# Patient Record
Sex: MALE | Race: OTHER | NOT HISPANIC OR LATINO | URBAN - METROPOLITAN AREA
[De-identification: names, ages, dates, MRNs, and addresses within clinical notes are randomized per-mention and may not be internally consistent; named-entity substitution may affect disease eponyms.]

---

## 2023-01-01 ENCOUNTER — INPATIENT (INPATIENT)
Facility: HOSPITAL | Age: 68
LOS: 14 days | Discharge: HOSPICE | DRG: 435 | End: 2023-09-25
Attending: SURGERY | Admitting: SURGERY
Payer: COMMERCIAL

## 2023-01-01 ENCOUNTER — INPATIENT (INPATIENT)
Facility: HOSPITAL | Age: 68
LOS: 0 days | DRG: 951 | End: 2023-09-26
Attending: INTERNAL MEDICINE | Admitting: INTERNAL MEDICINE
Payer: OTHER MISCELLANEOUS

## 2023-01-01 VITALS
HEIGHT: 72.01 IN | RESPIRATION RATE: 20 BRPM | OXYGEN SATURATION: 93 % | SYSTOLIC BLOOD PRESSURE: 103 MMHG | HEART RATE: 70 BPM | TEMPERATURE: 100 F | DIASTOLIC BLOOD PRESSURE: 67 MMHG | WEIGHT: 195.11 LBS

## 2023-01-01 VITALS
OXYGEN SATURATION: 94 % | DIASTOLIC BLOOD PRESSURE: 64 MMHG | HEART RATE: 58 BPM | TEMPERATURE: 99 F | RESPIRATION RATE: 20 BRPM | SYSTOLIC BLOOD PRESSURE: 100 MMHG

## 2023-01-01 VITALS
HEART RATE: 70 BPM | OXYGEN SATURATION: 93 % | SYSTOLIC BLOOD PRESSURE: 103 MMHG | RESPIRATION RATE: 18 BRPM | DIASTOLIC BLOOD PRESSURE: 67 MMHG | TEMPERATURE: 100 F

## 2023-01-01 VITALS
RESPIRATION RATE: 17 BRPM | SYSTOLIC BLOOD PRESSURE: 105 MMHG | OXYGEN SATURATION: 98 % | HEART RATE: 76 BPM | TEMPERATURE: 98 F | DIASTOLIC BLOOD PRESSURE: 61 MMHG

## 2023-01-01 DIAGNOSIS — G89.3 NEOPLASM RELATED PAIN (ACUTE) (CHRONIC): ICD-10-CM

## 2023-01-01 DIAGNOSIS — G93.49 OTHER ENCEPHALOPATHY: ICD-10-CM

## 2023-01-01 DIAGNOSIS — Z51.5 ENCOUNTER FOR PALLIATIVE CARE: ICD-10-CM

## 2023-01-01 DIAGNOSIS — R53.81 OTHER MALAISE: ICD-10-CM

## 2023-01-01 DIAGNOSIS — C25.9 MALIGNANT NEOPLASM OF PANCREAS, UNSPECIFIED: ICD-10-CM

## 2023-01-01 DIAGNOSIS — R45.1 RESTLESSNESS AND AGITATION: ICD-10-CM

## 2023-01-01 DIAGNOSIS — R53.2 FUNCTIONAL QUADRIPLEGIA: ICD-10-CM

## 2023-01-01 DIAGNOSIS — Z96.642 PRESENCE OF LEFT ARTIFICIAL HIP JOINT: Chronic | ICD-10-CM

## 2023-01-01 LAB
-  AMPICILLIN/SULBACTAM: SIGNIFICANT CHANGE UP
-  AMPICILLIN/SULBACTAM: SIGNIFICANT CHANGE UP
-  AMPICILLIN: SIGNIFICANT CHANGE UP
-  CEFAZOLIN: SIGNIFICANT CHANGE UP
-  CEFAZOLIN: SIGNIFICANT CHANGE UP
-  CEFEPIME: SIGNIFICANT CHANGE UP
-  CEFTRIAXONE: SIGNIFICANT CHANGE UP
-  CEFTRIAXONE: SIGNIFICANT CHANGE UP
-  CIPROFLOXACIN: SIGNIFICANT CHANGE UP
-  CIPROFLOXACIN: SIGNIFICANT CHANGE UP
-  DAPTOMYCIN: SIGNIFICANT CHANGE UP
-  ERTAPENEM: SIGNIFICANT CHANGE UP
-  ERTAPENEM: SIGNIFICANT CHANGE UP
-  GENTAMICIN: SIGNIFICANT CHANGE UP
-  GENTAMICIN: SIGNIFICANT CHANGE UP
-  LINEZOLID: SIGNIFICANT CHANGE UP
-  MEROPENEM: SIGNIFICANT CHANGE UP
-  PIPERACILLIN/TAZOBACTAM: SIGNIFICANT CHANGE UP
-  PIPERACILLIN/TAZOBACTAM: SIGNIFICANT CHANGE UP
-  TOBRAMYCIN: SIGNIFICANT CHANGE UP
-  TOBRAMYCIN: SIGNIFICANT CHANGE UP
-  TRIMETHOPRIM/SULFAMETHOXAZOLE: SIGNIFICANT CHANGE UP
-  TRIMETHOPRIM/SULFAMETHOXAZOLE: SIGNIFICANT CHANGE UP
-  VANCOMYCIN: SIGNIFICANT CHANGE UP
-  VANCOMYCIN: SIGNIFICANT CHANGE UP
A1C WITH ESTIMATED AVERAGE GLUCOSE RESULT: 4.6 % — SIGNIFICANT CHANGE UP (ref 4–5.6)
ALBUMIN SERPL ELPH-MCNC: 1.7 G/DL — LOW (ref 3.3–5)
ALBUMIN SERPL ELPH-MCNC: 1.8 G/DL — LOW (ref 3.3–5)
ALBUMIN SERPL ELPH-MCNC: 1.9 G/DL — LOW (ref 3.3–5)
ALBUMIN SERPL ELPH-MCNC: 2 G/DL — LOW (ref 3.3–5)
ALBUMIN SERPL ELPH-MCNC: 2 G/DL — LOW (ref 3.3–5)
ALBUMIN SERPL ELPH-MCNC: 2.1 G/DL — LOW (ref 3.3–5)
ALBUMIN SERPL ELPH-MCNC: 2.2 G/DL — LOW (ref 3.3–5)
ALBUMIN SERPL ELPH-MCNC: 2.2 G/DL — LOW (ref 3.3–5)
ALBUMIN SERPL ELPH-MCNC: SIGNIFICANT CHANGE UP (ref 3.3–5)
ALP SERPL-CCNC: 293 U/L — HIGH (ref 40–120)
ALP SERPL-CCNC: 294 U/L — HIGH (ref 40–120)
ALP SERPL-CCNC: 301 U/L — HIGH (ref 40–120)
ALP SERPL-CCNC: 313 U/L — HIGH (ref 40–120)
ALP SERPL-CCNC: 318 U/L — HIGH (ref 40–120)
ALP SERPL-CCNC: 323 U/L — HIGH (ref 40–120)
ALP SERPL-CCNC: 327 U/L — HIGH (ref 40–120)
ALP SERPL-CCNC: 336 U/L — HIGH (ref 40–120)
ALP SERPL-CCNC: 391 U/L — HIGH (ref 40–120)
ALP SERPL-CCNC: 414 U/L — HIGH (ref 40–120)
ALP SERPL-CCNC: 484 U/L — HIGH (ref 40–120)
ALP SERPL-CCNC: 519 U/L — HIGH (ref 40–120)
ALP SERPL-CCNC: 536 U/L — HIGH (ref 40–120)
ALP SERPL-CCNC: 565 U/L — HIGH (ref 40–120)
ALP SERPL-CCNC: 610 U/L — HIGH (ref 40–120)
ALP SERPL-CCNC: 649 U/L — HIGH (ref 40–120)
ALP SERPL-CCNC: SIGNIFICANT CHANGE UP (ref 40–120)
ALP SERPL-CCNC: SIGNIFICANT CHANGE UP U/L (ref 40–120)
ALT FLD-CCNC: 29 U/L — SIGNIFICANT CHANGE UP (ref 10–45)
ALT FLD-CCNC: 30 U/L — SIGNIFICANT CHANGE UP (ref 10–45)
ALT FLD-CCNC: 32 U/L — SIGNIFICANT CHANGE UP (ref 10–45)
ALT FLD-CCNC: 33 U/L — SIGNIFICANT CHANGE UP (ref 10–45)
ALT FLD-CCNC: 38 U/L — SIGNIFICANT CHANGE UP (ref 10–45)
ALT FLD-CCNC: 39 U/L — SIGNIFICANT CHANGE UP (ref 10–45)
ALT FLD-CCNC: 41 U/L — SIGNIFICANT CHANGE UP (ref 10–45)
ALT FLD-CCNC: 44 U/L — SIGNIFICANT CHANGE UP (ref 10–45)
ALT FLD-CCNC: 46 U/L — HIGH (ref 10–45)
ALT FLD-CCNC: 51 U/L — HIGH (ref 10–45)
ALT FLD-CCNC: 54 U/L — HIGH (ref 10–45)
ALT FLD-CCNC: 55 U/L — HIGH (ref 10–45)
ALT FLD-CCNC: 55 U/L — HIGH (ref 10–45)
ALT FLD-CCNC: SIGNIFICANT CHANGE UP (ref 10–45)
ALT FLD-CCNC: SIGNIFICANT CHANGE UP U/L (ref 10–45)
AMMONIA BLD-MCNC: 12 UMOL/L — SIGNIFICANT CHANGE UP (ref 11–55)
ANION GAP SERPL CALC-SCNC: 10 MMOL/L — SIGNIFICANT CHANGE UP (ref 5–17)
ANION GAP SERPL CALC-SCNC: 11 MMOL/L — SIGNIFICANT CHANGE UP (ref 5–17)
ANION GAP SERPL CALC-SCNC: 12 MMOL/L — SIGNIFICANT CHANGE UP (ref 5–17)
ANION GAP SERPL CALC-SCNC: 13 MMOL/L — SIGNIFICANT CHANGE UP (ref 5–17)
ANION GAP SERPL CALC-SCNC: 8 MMOL/L — SIGNIFICANT CHANGE UP (ref 5–17)
ANION GAP SERPL CALC-SCNC: 8 MMOL/L — SIGNIFICANT CHANGE UP (ref 5–17)
ANION GAP SERPL CALC-SCNC: 9 MMOL/L — SIGNIFICANT CHANGE UP (ref 5–17)
ANION GAP SERPL CALC-SCNC: SIGNIFICANT CHANGE UP MMOL/L (ref 5–17)
ANISOCYTOSIS BLD QL: SIGNIFICANT CHANGE UP
APPEARANCE UR: ABNORMAL
APPEARANCE UR: CLEAR — SIGNIFICANT CHANGE UP
APTT BLD: 31.9 SEC — SIGNIFICANT CHANGE UP (ref 24.5–35.6)
APTT BLD: 32.2 SEC — SIGNIFICANT CHANGE UP (ref 24.5–35.6)
APTT BLD: 33.3 SEC — SIGNIFICANT CHANGE UP (ref 24.5–35.6)
APTT BLD: 35.1 SEC — SIGNIFICANT CHANGE UP (ref 24.5–35.6)
APTT BLD: 37.1 SEC — HIGH (ref 24.5–35.6)
APTT BLD: 43.3 SEC — HIGH (ref 24.5–35.6)
APTT BLD: 45.9 SEC — HIGH (ref 24.5–35.6)
AST SERPL-CCNC: 105 U/L — HIGH (ref 10–40)
AST SERPL-CCNC: 112 U/L — HIGH (ref 10–40)
AST SERPL-CCNC: 114 U/L — HIGH (ref 10–40)
AST SERPL-CCNC: 137 U/L — HIGH (ref 10–40)
AST SERPL-CCNC: 155 U/L — HIGH (ref 10–40)
AST SERPL-CCNC: 158 U/L — HIGH (ref 10–40)
AST SERPL-CCNC: 178 U/L — HIGH (ref 10–40)
AST SERPL-CCNC: 187 U/L — HIGH (ref 10–40)
AST SERPL-CCNC: 267 U/L — HIGH (ref 10–40)
AST SERPL-CCNC: 60 U/L — HIGH (ref 10–40)
AST SERPL-CCNC: 71 U/L — HIGH (ref 10–40)
AST SERPL-CCNC: 74 U/L — HIGH (ref 10–40)
AST SERPL-CCNC: 76 U/L — HIGH (ref 10–40)
AST SERPL-CCNC: 78 U/L — HIGH (ref 10–40)
AST SERPL-CCNC: 88 U/L — HIGH (ref 10–40)
AST SERPL-CCNC: 90 U/L — HIGH (ref 10–40)
AST SERPL-CCNC: SIGNIFICANT CHANGE UP (ref 10–40)
AST SERPL-CCNC: SIGNIFICANT CHANGE UP U/L (ref 10–40)
B PERT IGG+IGM PNL SER: SIGNIFICANT CHANGE UP
B PERT IGG+IGM PNL SER: SIGNIFICANT CHANGE UP
BACTERIA # UR AUTO: ABNORMAL /HPF
BASOPHILS # BLD AUTO: 0.03 K/UL — SIGNIFICANT CHANGE UP (ref 0–0.2)
BASOPHILS # BLD AUTO: 0.07 K/UL — SIGNIFICANT CHANGE UP (ref 0–0.2)
BASOPHILS NFR BLD AUTO: 0.3 % — SIGNIFICANT CHANGE UP (ref 0–2)
BASOPHILS NFR BLD AUTO: 0.9 % — SIGNIFICANT CHANGE UP (ref 0–2)
BILIRUB DIRECT SERPL-MCNC: 7.3 MG/DL — HIGH (ref 0–0.3)
BILIRUB DIRECT SERPL-MCNC: 7.4 MG/DL — HIGH (ref 0–0.3)
BILIRUB DIRECT SERPL-MCNC: 7.9 MG/DL — HIGH (ref 0–0.3)
BILIRUB DIRECT SERPL-MCNC: 8.2 MG/DL — HIGH (ref 0–0.3)
BILIRUB DIRECT SERPL-MCNC: 8.4 MG/DL — HIGH (ref 0–0.3)
BILIRUB DIRECT SERPL-MCNC: 8.5 MG/DL — HIGH (ref 0–0.3)
BILIRUB DIRECT SERPL-MCNC: 8.6 MG/DL — HIGH (ref 0–0.3)
BILIRUB DIRECT SERPL-MCNC: 8.8 MG/DL — HIGH (ref 0–0.3)
BILIRUB DIRECT SERPL-MCNC: 9.2 MG/DL — HIGH (ref 0–0.3)
BILIRUB DIRECT SERPL-MCNC: 9.2 MG/DL — HIGH (ref 0–0.3)
BILIRUB DIRECT SERPL-MCNC: 9.3 MG/DL — HIGH (ref 0–0.3)
BILIRUB DIRECT SERPL-MCNC: 9.5 MG/DL — HIGH (ref 0–0.3)
BILIRUB DIRECT SERPL-MCNC: 9.9 MG/DL — HIGH (ref 0–0.3)
BILIRUB DIRECT SERPL-MCNC: SIGNIFICANT CHANGE UP (ref 0–0.3)
BILIRUB INDIRECT FLD-MCNC: 2.2 MG/DL — HIGH (ref 0.2–1)
BILIRUB INDIRECT FLD-MCNC: 2.4 MG/DL — HIGH (ref 0.2–1)
BILIRUB INDIRECT FLD-MCNC: 2.4 MG/DL — HIGH (ref 0.2–1)
BILIRUB INDIRECT FLD-MCNC: 2.8 MG/DL — HIGH (ref 0.2–1)
BILIRUB INDIRECT FLD-MCNC: 3 MG/DL — HIGH (ref 0.2–1)
BILIRUB INDIRECT FLD-MCNC: 3.1 MG/DL — HIGH (ref 0.2–1)
BILIRUB INDIRECT FLD-MCNC: 3.2 MG/DL — HIGH (ref 0.2–1)
BILIRUB INDIRECT FLD-MCNC: 3.3 MG/DL — HIGH (ref 0.2–1)
BILIRUB INDIRECT FLD-MCNC: 3.3 MG/DL — HIGH (ref 0.2–1)
BILIRUB INDIRECT FLD-MCNC: 3.9 MG/DL — HIGH (ref 0.2–1)
BILIRUB INDIRECT FLD-MCNC: SIGNIFICANT CHANGE UP MG/DL (ref 0.2–1)
BILIRUB SERPL-MCNC: 10.8 MG/DL — HIGH (ref 0.2–1.2)
BILIRUB SERPL-MCNC: 11.2 MG/DL — HIGH (ref 0.2–1.2)
BILIRUB SERPL-MCNC: 11.4 MG/DL — HIGH (ref 0.2–1.2)
BILIRUB SERPL-MCNC: 11.4 MG/DL — HIGH (ref 0.2–1.2)
BILIRUB SERPL-MCNC: 11.5 MG/DL — HIGH (ref 0.2–1.2)
BILIRUB SERPL-MCNC: 11.5 MG/DL — HIGH (ref 0.2–1.2)
BILIRUB SERPL-MCNC: 12 MG/DL — HIGH (ref 0.2–1.2)
BILIRUB SERPL-MCNC: 12.2 MG/DL — HIGH (ref 0.2–1.2)
BILIRUB SERPL-MCNC: 12.6 MG/DL — HIGH (ref 0.2–1.2)
BILIRUB SERPL-MCNC: 12.6 MG/DL — HIGH (ref 0.2–1.2)
BILIRUB SERPL-MCNC: 12.8 MG/DL — HIGH (ref 0.2–1.2)
BILIRUB SERPL-MCNC: 13.2 MG/DL — HIGH (ref 0.2–1.2)
BILIRUB SERPL-MCNC: 13.8 MG/DL — HIGH (ref 0.2–1.2)
BILIRUB SERPL-MCNC: 14 MG/DL — HIGH (ref 0.2–1.2)
BILIRUB SERPL-MCNC: 9.7 MG/DL — HIGH (ref 0.2–1.2)
BILIRUB SERPL-MCNC: 9.8 MG/DL — HIGH (ref 0.2–1.2)
BILIRUB SERPL-MCNC: 9.9 MG/DL — HIGH (ref 0.2–1.2)
BILIRUB SERPL-MCNC: SIGNIFICANT CHANGE UP (ref 0.2–1.2)
BILIRUB UR-MCNC: ABNORMAL
BILIRUB UR-MCNC: ABNORMAL
BLD GP AB SCN SERPL QL: NEGATIVE — SIGNIFICANT CHANGE UP
BUN SERPL-MCNC: 20 MG/DL — SIGNIFICANT CHANGE UP (ref 7–23)
BUN SERPL-MCNC: 21 MG/DL — SIGNIFICANT CHANGE UP (ref 7–23)
BUN SERPL-MCNC: 22 MG/DL — SIGNIFICANT CHANGE UP (ref 7–23)
BUN SERPL-MCNC: 24 MG/DL — HIGH (ref 7–23)
BUN SERPL-MCNC: 25 MG/DL — HIGH (ref 7–23)
BUN SERPL-MCNC: 26 MG/DL — HIGH (ref 7–23)
BUN SERPL-MCNC: 27 MG/DL — HIGH (ref 7–23)
BUN SERPL-MCNC: 27 MG/DL — HIGH (ref 7–23)
BUN SERPL-MCNC: 30 MG/DL — HIGH (ref 7–23)
BUN SERPL-MCNC: 31 MG/DL — HIGH (ref 7–23)
BUN SERPL-MCNC: 31 MG/DL — HIGH (ref 7–23)
BUN SERPL-MCNC: 34 MG/DL — HIGH (ref 7–23)
BUN SERPL-MCNC: 36 MG/DL — HIGH (ref 7–23)
BUN SERPL-MCNC: 36 MG/DL — HIGH (ref 7–23)
BUN SERPL-MCNC: 37 MG/DL — HIGH (ref 7–23)
BUN SERPL-MCNC: 39 MG/DL — HIGH (ref 7–23)
BUN SERPL-MCNC: 40 MG/DL — HIGH (ref 7–23)
CALCIUM SERPL-MCNC: 7 MG/DL — LOW (ref 8.4–10.5)
CALCIUM SERPL-MCNC: 7.8 MG/DL — LOW (ref 8.4–10.5)
CALCIUM SERPL-MCNC: 8 MG/DL — LOW (ref 8.4–10.5)
CALCIUM SERPL-MCNC: 8.1 MG/DL — LOW (ref 8.4–10.5)
CALCIUM SERPL-MCNC: 8.3 MG/DL — LOW (ref 8.4–10.5)
CALCIUM SERPL-MCNC: 8.4 MG/DL — SIGNIFICANT CHANGE UP (ref 8.4–10.5)
CALCIUM SERPL-MCNC: 8.5 MG/DL — SIGNIFICANT CHANGE UP (ref 8.4–10.5)
CALCIUM SERPL-MCNC: 8.6 MG/DL — SIGNIFICANT CHANGE UP (ref 8.4–10.5)
CALCIUM SERPL-MCNC: SIGNIFICANT CHANGE UP (ref 8.4–10.5)
CHLORIDE SERPL-SCNC: 100 MMOL/L — SIGNIFICANT CHANGE UP (ref 96–108)
CHLORIDE SERPL-SCNC: 100 MMOL/L — SIGNIFICANT CHANGE UP (ref 96–108)
CHLORIDE SERPL-SCNC: 102 MMOL/L — SIGNIFICANT CHANGE UP (ref 96–108)
CHLORIDE SERPL-SCNC: 104 MMOL/L — SIGNIFICANT CHANGE UP (ref 96–108)
CHLORIDE SERPL-SCNC: 105 MMOL/L — SIGNIFICANT CHANGE UP (ref 96–108)
CHLORIDE SERPL-SCNC: 105 MMOL/L — SIGNIFICANT CHANGE UP (ref 96–108)
CHLORIDE SERPL-SCNC: 106 MMOL/L — SIGNIFICANT CHANGE UP (ref 96–108)
CHLORIDE SERPL-SCNC: 108 MMOL/L — SIGNIFICANT CHANGE UP (ref 96–108)
CHLORIDE SERPL-SCNC: 110 MMOL/L — HIGH (ref 96–108)
CHLORIDE SERPL-SCNC: 113 MMOL/L — HIGH (ref 96–108)
CHLORIDE SERPL-SCNC: 114 MMOL/L — HIGH (ref 96–108)
CHLORIDE SERPL-SCNC: 114 MMOL/L — HIGH (ref 96–108)
CHLORIDE SERPL-SCNC: 99 MMOL/L — SIGNIFICANT CHANGE UP (ref 96–108)
CHLORIDE SERPL-SCNC: SIGNIFICANT CHANGE UP (ref 96–108)
CK SERPL-CCNC: 68 U/L — SIGNIFICANT CHANGE UP (ref 30–200)
CO2 SERPL-SCNC: 19 MMOL/L — LOW (ref 22–31)
CO2 SERPL-SCNC: 20 MMOL/L — LOW (ref 22–31)
CO2 SERPL-SCNC: 21 MMOL/L — LOW (ref 22–31)
CO2 SERPL-SCNC: 22 MMOL/L — SIGNIFICANT CHANGE UP (ref 22–31)
CO2 SERPL-SCNC: 24 MMOL/L — SIGNIFICANT CHANGE UP (ref 22–31)
CO2 SERPL-SCNC: 25 MMOL/L — SIGNIFICANT CHANGE UP (ref 22–31)
CO2 SERPL-SCNC: 25 MMOL/L — SIGNIFICANT CHANGE UP (ref 22–31)
CO2 SERPL-SCNC: SIGNIFICANT CHANGE UP (ref 22–31)
COLOR FLD: YELLOW — SIGNIFICANT CHANGE UP
COLOR FLD: YELLOW — SIGNIFICANT CHANGE UP
COLOR SPEC: ABNORMAL
COLOR SPEC: YELLOW — SIGNIFICANT CHANGE UP
COMMENT - FLUIDS: SIGNIFICANT CHANGE UP
COMMENT - FLUIDS: SIGNIFICANT CHANGE UP
COMMENT - URINE: SIGNIFICANT CHANGE UP
CREAT SERPL-MCNC: 0.89 MG/DL — SIGNIFICANT CHANGE UP (ref 0.5–1.3)
CREAT SERPL-MCNC: 1.01 MG/DL — SIGNIFICANT CHANGE UP (ref 0.5–1.3)
CREAT SERPL-MCNC: 1.08 MG/DL — SIGNIFICANT CHANGE UP (ref 0.5–1.3)
CREAT SERPL-MCNC: 1.08 MG/DL — SIGNIFICANT CHANGE UP (ref 0.5–1.3)
CREAT SERPL-MCNC: 1.09 MG/DL — SIGNIFICANT CHANGE UP (ref 0.5–1.3)
CREAT SERPL-MCNC: 1.11 MG/DL — SIGNIFICANT CHANGE UP (ref 0.5–1.3)
CREAT SERPL-MCNC: 1.13 MG/DL — SIGNIFICANT CHANGE UP (ref 0.5–1.3)
CREAT SERPL-MCNC: 1.15 MG/DL — SIGNIFICANT CHANGE UP (ref 0.5–1.3)
CREAT SERPL-MCNC: 1.15 MG/DL — SIGNIFICANT CHANGE UP (ref 0.5–1.3)
CREAT SERPL-MCNC: 1.16 MG/DL — SIGNIFICANT CHANGE UP (ref 0.5–1.3)
CREAT SERPL-MCNC: 1.2 MG/DL — SIGNIFICANT CHANGE UP (ref 0.5–1.3)
CREAT SERPL-MCNC: 1.21 MG/DL — SIGNIFICANT CHANGE UP (ref 0.5–1.3)
CREAT SERPL-MCNC: 1.21 MG/DL — SIGNIFICANT CHANGE UP (ref 0.5–1.3)
CREAT SERPL-MCNC: 1.38 MG/DL — HIGH (ref 0.5–1.3)
CREAT SERPL-MCNC: 1.4 MG/DL — HIGH (ref 0.5–1.3)
CREAT SERPL-MCNC: 1.53 MG/DL — HIGH (ref 0.5–1.3)
CREAT SERPL-MCNC: 1.61 MG/DL — HIGH (ref 0.5–1.3)
CREAT SERPL-MCNC: 1.64 MG/DL — HIGH (ref 0.5–1.3)
CREAT SERPL-MCNC: 1.78 MG/DL — HIGH (ref 0.5–1.3)
CULTURE RESULTS: NO GROWTH — SIGNIFICANT CHANGE UP
CULTURE RESULTS: SIGNIFICANT CHANGE UP
DIFF PNL FLD: ABNORMAL
DIFF PNL FLD: NEGATIVE — SIGNIFICANT CHANGE UP
EGFR: 42 ML/MIN/1.73M2 — LOW
EGFR: 46 ML/MIN/1.73M2 — LOW
EGFR: 47 ML/MIN/1.73M2 — LOW
EGFR: 50 ML/MIN/1.73M2 — LOW
EGFR: 55 ML/MIN/1.73M2 — LOW
EGFR: 56 ML/MIN/1.73M2 — LOW
EGFR: 66 ML/MIN/1.73M2 — SIGNIFICANT CHANGE UP
EGFR: 66 ML/MIN/1.73M2 — SIGNIFICANT CHANGE UP
EGFR: 67 ML/MIN/1.73M2 — SIGNIFICANT CHANGE UP
EGFR: 69 ML/MIN/1.73M2 — SIGNIFICANT CHANGE UP
EGFR: 70 ML/MIN/1.73M2 — SIGNIFICANT CHANGE UP
EGFR: 70 ML/MIN/1.73M2 — SIGNIFICANT CHANGE UP
EGFR: 72 ML/MIN/1.73M2 — SIGNIFICANT CHANGE UP
EGFR: 73 ML/MIN/1.73M2 — SIGNIFICANT CHANGE UP
EGFR: 75 ML/MIN/1.73M2 — SIGNIFICANT CHANGE UP
EGFR: 76 ML/MIN/1.73M2 — SIGNIFICANT CHANGE UP
EGFR: 76 ML/MIN/1.73M2 — SIGNIFICANT CHANGE UP
EGFR: 82 ML/MIN/1.73M2 — SIGNIFICANT CHANGE UP
EGFR: 95 ML/MIN/1.73M2 — SIGNIFICANT CHANGE UP
EOSINOPHIL # BLD AUTO: 0.07 K/UL — SIGNIFICANT CHANGE UP (ref 0–0.5)
EOSINOPHIL # BLD AUTO: 0.16 K/UL — SIGNIFICANT CHANGE UP (ref 0–0.5)
EOSINOPHIL NFR BLD AUTO: 0.9 % — SIGNIFICANT CHANGE UP (ref 0–6)
EOSINOPHIL NFR BLD AUTO: 1.8 % — SIGNIFICANT CHANGE UP (ref 0–6)
EPI CELLS # UR: ABNORMAL /HPF (ref 0–5)
ESTIMATED AVERAGE GLUCOSE: 85 MG/DL — SIGNIFICANT CHANGE UP (ref 68–114)
FLUID INTAKE SUBSTANCE CLASS: SIGNIFICANT CHANGE UP
FLUID INTAKE SUBSTANCE CLASS: SIGNIFICANT CHANGE UP
GIANT PLATELETS BLD QL SMEAR: PRESENT — SIGNIFICANT CHANGE UP
GLUCOSE BLDC GLUCOMTR-MCNC: 102 MG/DL — HIGH (ref 70–99)
GLUCOSE BLDC GLUCOMTR-MCNC: 102 MG/DL — HIGH (ref 70–99)
GLUCOSE BLDC GLUCOMTR-MCNC: 104 MG/DL — HIGH (ref 70–99)
GLUCOSE BLDC GLUCOMTR-MCNC: 105 MG/DL — HIGH (ref 70–99)
GLUCOSE BLDC GLUCOMTR-MCNC: 106 MG/DL — HIGH (ref 70–99)
GLUCOSE BLDC GLUCOMTR-MCNC: 107 MG/DL — HIGH (ref 70–99)
GLUCOSE BLDC GLUCOMTR-MCNC: 108 MG/DL — HIGH (ref 70–99)
GLUCOSE BLDC GLUCOMTR-MCNC: 109 MG/DL — HIGH (ref 70–99)
GLUCOSE BLDC GLUCOMTR-MCNC: 110 MG/DL — HIGH (ref 70–99)
GLUCOSE BLDC GLUCOMTR-MCNC: 112 MG/DL — HIGH (ref 70–99)
GLUCOSE BLDC GLUCOMTR-MCNC: 113 MG/DL — HIGH (ref 70–99)
GLUCOSE BLDC GLUCOMTR-MCNC: 115 MG/DL — HIGH (ref 70–99)
GLUCOSE BLDC GLUCOMTR-MCNC: 115 MG/DL — HIGH (ref 70–99)
GLUCOSE BLDC GLUCOMTR-MCNC: 118 MG/DL — HIGH (ref 70–99)
GLUCOSE BLDC GLUCOMTR-MCNC: 119 MG/DL — HIGH (ref 70–99)
GLUCOSE BLDC GLUCOMTR-MCNC: 121 MG/DL — HIGH (ref 70–99)
GLUCOSE BLDC GLUCOMTR-MCNC: 122 MG/DL — HIGH (ref 70–99)
GLUCOSE BLDC GLUCOMTR-MCNC: 123 MG/DL — HIGH (ref 70–99)
GLUCOSE BLDC GLUCOMTR-MCNC: 124 MG/DL — HIGH (ref 70–99)
GLUCOSE BLDC GLUCOMTR-MCNC: 124 MG/DL — HIGH (ref 70–99)
GLUCOSE BLDC GLUCOMTR-MCNC: 126 MG/DL — HIGH (ref 70–99)
GLUCOSE BLDC GLUCOMTR-MCNC: 127 MG/DL — HIGH (ref 70–99)
GLUCOSE BLDC GLUCOMTR-MCNC: 128 MG/DL — HIGH (ref 70–99)
GLUCOSE BLDC GLUCOMTR-MCNC: 128 MG/DL — HIGH (ref 70–99)
GLUCOSE BLDC GLUCOMTR-MCNC: 129 MG/DL — HIGH (ref 70–99)
GLUCOSE BLDC GLUCOMTR-MCNC: 130 MG/DL — HIGH (ref 70–99)
GLUCOSE BLDC GLUCOMTR-MCNC: 131 MG/DL — HIGH (ref 70–99)
GLUCOSE BLDC GLUCOMTR-MCNC: 134 MG/DL — HIGH (ref 70–99)
GLUCOSE BLDC GLUCOMTR-MCNC: 138 MG/DL — HIGH (ref 70–99)
GLUCOSE BLDC GLUCOMTR-MCNC: 138 MG/DL — HIGH (ref 70–99)
GLUCOSE BLDC GLUCOMTR-MCNC: 142 MG/DL — HIGH (ref 70–99)
GLUCOSE BLDC GLUCOMTR-MCNC: 146 MG/DL — HIGH (ref 70–99)
GLUCOSE BLDC GLUCOMTR-MCNC: 151 MG/DL — HIGH (ref 70–99)
GLUCOSE BLDC GLUCOMTR-MCNC: 152 MG/DL — HIGH (ref 70–99)
GLUCOSE BLDC GLUCOMTR-MCNC: 155 MG/DL — HIGH (ref 70–99)
GLUCOSE BLDC GLUCOMTR-MCNC: 156 MG/DL — HIGH (ref 70–99)
GLUCOSE BLDC GLUCOMTR-MCNC: 156 MG/DL — HIGH (ref 70–99)
GLUCOSE BLDC GLUCOMTR-MCNC: 158 MG/DL — HIGH (ref 70–99)
GLUCOSE BLDC GLUCOMTR-MCNC: 159 MG/DL — HIGH (ref 70–99)
GLUCOSE BLDC GLUCOMTR-MCNC: 159 MG/DL — HIGH (ref 70–99)
GLUCOSE BLDC GLUCOMTR-MCNC: 93 MG/DL — SIGNIFICANT CHANGE UP (ref 70–99)
GLUCOSE BLDC GLUCOMTR-MCNC: 96 MG/DL — SIGNIFICANT CHANGE UP (ref 70–99)
GLUCOSE BLDC GLUCOMTR-MCNC: 97 MG/DL — SIGNIFICANT CHANGE UP (ref 70–99)
GLUCOSE FLD-MCNC: 158 MG/DL — SIGNIFICANT CHANGE UP
GLUCOSE SERPL-MCNC: 110 MG/DL — HIGH (ref 70–99)
GLUCOSE SERPL-MCNC: 115 MG/DL — HIGH (ref 70–99)
GLUCOSE SERPL-MCNC: 116 MG/DL — HIGH (ref 70–99)
GLUCOSE SERPL-MCNC: 118 MG/DL — HIGH (ref 70–99)
GLUCOSE SERPL-MCNC: 121 MG/DL — HIGH (ref 70–99)
GLUCOSE SERPL-MCNC: 126 MG/DL — HIGH (ref 70–99)
GLUCOSE SERPL-MCNC: 128 MG/DL — HIGH (ref 70–99)
GLUCOSE SERPL-MCNC: 130 MG/DL — HIGH (ref 70–99)
GLUCOSE SERPL-MCNC: 131 MG/DL — HIGH (ref 70–99)
GLUCOSE SERPL-MCNC: 133 MG/DL — HIGH (ref 70–99)
GLUCOSE SERPL-MCNC: 133 MG/DL — HIGH (ref 70–99)
GLUCOSE SERPL-MCNC: 139 MG/DL — HIGH (ref 70–99)
GLUCOSE SERPL-MCNC: 141 MG/DL — HIGH (ref 70–99)
GLUCOSE SERPL-MCNC: 147 MG/DL — HIGH (ref 70–99)
GLUCOSE SERPL-MCNC: 156 MG/DL — HIGH (ref 70–99)
GLUCOSE SERPL-MCNC: 157 MG/DL — HIGH (ref 70–99)
GLUCOSE SERPL-MCNC: 166 MG/DL — HIGH (ref 70–99)
GLUCOSE SERPL-MCNC: 688 MG/DL — CRITICAL HIGH (ref 70–99)
GLUCOSE SERPL-MCNC: SIGNIFICANT CHANGE UP (ref 70–99)
GLUCOSE UR QL: 250
GLUCOSE UR QL: NEGATIVE — SIGNIFICANT CHANGE UP
GRAM STN FLD: SIGNIFICANT CHANGE UP
GRAM STN FLD: SIGNIFICANT CHANGE UP
GRAN CASTS # UR COMP ASSIST: ABNORMAL /LPF
HCT VFR BLD CALC: 20.9 % — CRITICAL LOW (ref 39–50)
HCT VFR BLD CALC: 22.9 % — LOW (ref 39–50)
HCT VFR BLD CALC: 23.8 % — LOW (ref 39–50)
HCT VFR BLD CALC: 24.1 % — LOW (ref 39–50)
HCT VFR BLD CALC: 24.9 % — LOW (ref 39–50)
HCT VFR BLD CALC: 26 % — LOW (ref 39–50)
HCT VFR BLD CALC: 26.5 % — LOW (ref 39–50)
HCT VFR BLD CALC: 26.5 % — LOW (ref 39–50)
HCT VFR BLD CALC: 28.2 % — LOW (ref 39–50)
HCT VFR BLD CALC: 28.2 % — LOW (ref 39–50)
HCT VFR BLD CALC: 28.7 % — LOW (ref 39–50)
HCT VFR BLD CALC: 29 % — LOW (ref 39–50)
HCT VFR BLD CALC: 29.1 % — LOW (ref 39–50)
HCT VFR BLD CALC: 29.4 % — LOW (ref 39–50)
HCT VFR BLD CALC: 29.6 % — LOW (ref 39–50)
HCT VFR BLD CALC: 30.6 % — LOW (ref 39–50)
HCT VFR BLD CALC: 32 % — LOW (ref 39–50)
HCV AB S/CO SERPL IA: 0.04 S/CO — SIGNIFICANT CHANGE UP
HCV AB SERPL-IMP: SIGNIFICANT CHANGE UP
HGB BLD-MCNC: 10.6 G/DL — LOW (ref 13–17)
HGB BLD-MCNC: 7 G/DL — CRITICAL LOW (ref 13–17)
HGB BLD-MCNC: 7.4 G/DL — LOW (ref 13–17)
HGB BLD-MCNC: 7.7 G/DL — LOW (ref 13–17)
HGB BLD-MCNC: 7.9 G/DL — LOW (ref 13–17)
HGB BLD-MCNC: 8.1 G/DL — LOW (ref 13–17)
HGB BLD-MCNC: 8.4 G/DL — LOW (ref 13–17)
HGB BLD-MCNC: 8.6 G/DL — LOW (ref 13–17)
HGB BLD-MCNC: 8.6 G/DL — LOW (ref 13–17)
HGB BLD-MCNC: 9 G/DL — LOW (ref 13–17)
HGB BLD-MCNC: 9.2 G/DL — LOW (ref 13–17)
HGB BLD-MCNC: 9.3 G/DL — LOW (ref 13–17)
HGB BLD-MCNC: 9.4 G/DL — LOW (ref 13–17)
HGB BLD-MCNC: 9.5 G/DL — LOW (ref 13–17)
HGB BLD-MCNC: 9.6 G/DL — LOW (ref 13–17)
HGB BLD-MCNC: 9.6 G/DL — LOW (ref 13–17)
HGB BLD-MCNC: 9.7 G/DL — LOW (ref 13–17)
HYALINE CASTS # UR AUTO: ABNORMAL /LPF (ref 0–2)
HYPOCHROMIA BLD QL: SIGNIFICANT CHANGE UP
IMM GRANULOCYTES NFR BLD AUTO: 1.2 % — HIGH (ref 0–0.9)
INR BLD: 1.38 — HIGH (ref 0.85–1.18)
INR BLD: 1.49 — HIGH (ref 0.85–1.18)
INR BLD: 1.51 — HIGH (ref 0.85–1.18)
INR BLD: 1.55 — HIGH (ref 0.85–1.18)
INR BLD: 1.65 — HIGH (ref 0.85–1.18)
INR BLD: 1.69 — HIGH (ref 0.85–1.18)
INR BLD: 1.71 — HIGH (ref 0.85–1.18)
KETONES UR-MCNC: ABNORMAL MG/DL
KETONES UR-MCNC: NEGATIVE — SIGNIFICANT CHANGE UP
LACTATE SERPL-SCNC: 1.7 MMOL/L — SIGNIFICANT CHANGE UP (ref 0.5–2)
LEUKOCYTE ESTERASE UR-ACNC: ABNORMAL
LEUKOCYTE ESTERASE UR-ACNC: NEGATIVE — SIGNIFICANT CHANGE UP
LIDOCAIN IGE QN: 41 U/L — SIGNIFICANT CHANGE UP (ref 7–60)
LYMPHOCYTES # BLD AUTO: 0.6 K/UL — LOW (ref 1–3.3)
LYMPHOCYTES # BLD AUTO: 0.92 K/UL — LOW (ref 1–3.3)
LYMPHOCYTES # BLD AUTO: 10.3 % — LOW (ref 13–44)
LYMPHOCYTES # BLD AUTO: 7.9 % — LOW (ref 13–44)
LYMPHOCYTES # FLD: 1 % — SIGNIFICANT CHANGE UP
LYMPHOCYTES # FLD: 53 % — SIGNIFICANT CHANGE UP
MACROCYTES BLD QL: SIGNIFICANT CHANGE UP
MAGNESIUM SERPL-MCNC: 1.1 MG/DL — LOW (ref 1.6–2.6)
MAGNESIUM SERPL-MCNC: 1.4 MG/DL — LOW (ref 1.6–2.6)
MAGNESIUM SERPL-MCNC: 1.6 MG/DL — SIGNIFICANT CHANGE UP (ref 1.6–2.6)
MAGNESIUM SERPL-MCNC: 1.7 MG/DL — SIGNIFICANT CHANGE UP (ref 1.6–2.6)
MAGNESIUM SERPL-MCNC: 1.8 MG/DL — SIGNIFICANT CHANGE UP (ref 1.6–2.6)
MAGNESIUM SERPL-MCNC: 1.8 MG/DL — SIGNIFICANT CHANGE UP (ref 1.6–2.6)
MAGNESIUM SERPL-MCNC: 1.9 MG/DL — SIGNIFICANT CHANGE UP (ref 1.6–2.6)
MAGNESIUM SERPL-MCNC: 2 MG/DL — SIGNIFICANT CHANGE UP (ref 1.6–2.6)
MAGNESIUM SERPL-MCNC: 2.1 MG/DL — SIGNIFICANT CHANGE UP (ref 1.6–2.6)
MAGNESIUM SERPL-MCNC: 2.1 MG/DL — SIGNIFICANT CHANGE UP (ref 1.6–2.6)
MAGNESIUM SERPL-MCNC: 2.2 MG/DL — SIGNIFICANT CHANGE UP (ref 1.6–2.6)
MAGNESIUM SERPL-MCNC: 2.2 MG/DL — SIGNIFICANT CHANGE UP (ref 1.6–2.6)
MAGNESIUM SERPL-MCNC: 2.3 MG/DL — SIGNIFICANT CHANGE UP (ref 1.6–2.6)
MANUAL SMEAR VERIFICATION: SIGNIFICANT CHANGE UP
MCHC RBC-ENTMCNC: 30.7 GM/DL — LOW (ref 32–36)
MCHC RBC-ENTMCNC: 30.7 PG — SIGNIFICANT CHANGE UP (ref 27–34)
MCHC RBC-ENTMCNC: 31 PG — SIGNIFICANT CHANGE UP (ref 27–34)
MCHC RBC-ENTMCNC: 31.2 PG — SIGNIFICANT CHANGE UP (ref 27–34)
MCHC RBC-ENTMCNC: 31.4 GM/DL — LOW (ref 32–36)
MCHC RBC-ENTMCNC: 31.5 PG — SIGNIFICANT CHANGE UP (ref 27–34)
MCHC RBC-ENTMCNC: 31.7 PG — SIGNIFICANT CHANGE UP (ref 27–34)
MCHC RBC-ENTMCNC: 31.7 PG — SIGNIFICANT CHANGE UP (ref 27–34)
MCHC RBC-ENTMCNC: 31.8 PG — SIGNIFICANT CHANGE UP (ref 27–34)
MCHC RBC-ENTMCNC: 31.9 GM/DL — LOW (ref 32–36)
MCHC RBC-ENTMCNC: 31.9 PG — SIGNIFICANT CHANGE UP (ref 27–34)
MCHC RBC-ENTMCNC: 32 GM/DL — SIGNIFICANT CHANGE UP (ref 32–36)
MCHC RBC-ENTMCNC: 32.1 GM/DL — SIGNIFICANT CHANGE UP (ref 32–36)
MCHC RBC-ENTMCNC: 32.1 PG — SIGNIFICANT CHANGE UP (ref 27–34)
MCHC RBC-ENTMCNC: 32.1 PG — SIGNIFICANT CHANGE UP (ref 27–34)
MCHC RBC-ENTMCNC: 32.3 GM/DL — SIGNIFICANT CHANGE UP (ref 32–36)
MCHC RBC-ENTMCNC: 32.5 GM/DL — SIGNIFICANT CHANGE UP (ref 32–36)
MCHC RBC-ENTMCNC: 32.6 GM/DL — SIGNIFICANT CHANGE UP (ref 32–36)
MCHC RBC-ENTMCNC: 32.8 GM/DL — SIGNIFICANT CHANGE UP (ref 32–36)
MCHC RBC-ENTMCNC: 32.8 PG — SIGNIFICANT CHANGE UP (ref 27–34)
MCHC RBC-ENTMCNC: 32.9 PG — SIGNIFICANT CHANGE UP (ref 27–34)
MCHC RBC-ENTMCNC: 33 GM/DL — SIGNIFICANT CHANGE UP (ref 32–36)
MCHC RBC-ENTMCNC: 33.1 GM/DL — SIGNIFICANT CHANGE UP (ref 32–36)
MCHC RBC-ENTMCNC: 33.1 GM/DL — SIGNIFICANT CHANGE UP (ref 32–36)
MCHC RBC-ENTMCNC: 33.2 GM/DL — SIGNIFICANT CHANGE UP (ref 32–36)
MCHC RBC-ENTMCNC: 33.2 PG — SIGNIFICANT CHANGE UP (ref 27–34)
MCHC RBC-ENTMCNC: 33.5 GM/DL — SIGNIFICANT CHANGE UP (ref 32–36)
MCHC RBC-ENTMCNC: 33.6 GM/DL — SIGNIFICANT CHANGE UP (ref 32–36)
MCV RBC AUTO: 100.7 FL — HIGH (ref 80–100)
MCV RBC AUTO: 100.7 FL — HIGH (ref 80–100)
MCV RBC AUTO: 100.8 FL — HIGH (ref 80–100)
MCV RBC AUTO: 101.7 FL — HIGH (ref 80–100)
MCV RBC AUTO: 102.3 FL — HIGH (ref 80–100)
MCV RBC AUTO: 102.6 FL — HIGH (ref 80–100)
MCV RBC AUTO: 92.6 FL — SIGNIFICANT CHANGE UP (ref 80–100)
MCV RBC AUTO: 93.6 FL — SIGNIFICANT CHANGE UP (ref 80–100)
MCV RBC AUTO: 94.1 FL — SIGNIFICANT CHANGE UP (ref 80–100)
MCV RBC AUTO: 94.2 FL — SIGNIFICANT CHANGE UP (ref 80–100)
MCV RBC AUTO: 94.2 FL — SIGNIFICANT CHANGE UP (ref 80–100)
MCV RBC AUTO: 96.7 FL — SIGNIFICANT CHANGE UP (ref 80–100)
MCV RBC AUTO: 97 FL — SIGNIFICANT CHANGE UP (ref 80–100)
MCV RBC AUTO: 98 FL — SIGNIFICANT CHANGE UP (ref 80–100)
MCV RBC AUTO: 98.1 FL — SIGNIFICANT CHANGE UP (ref 80–100)
MCV RBC AUTO: 99.2 FL — SIGNIFICANT CHANGE UP (ref 80–100)
MCV RBC AUTO: 99.3 FL — SIGNIFICANT CHANGE UP (ref 80–100)
MESOTHL CELL # FLD: 2 % — SIGNIFICANT CHANGE UP
METHOD TYPE: SIGNIFICANT CHANGE UP
MONOCYTES # BLD AUTO: 0.46 K/UL — SIGNIFICANT CHANGE UP (ref 0–0.9)
MONOCYTES # BLD AUTO: 0.55 K/UL — SIGNIFICANT CHANGE UP (ref 0–0.9)
MONOCYTES NFR BLD AUTO: 6.1 % — SIGNIFICANT CHANGE UP (ref 2–14)
MONOCYTES NFR BLD AUTO: 6.1 % — SIGNIFICANT CHANGE UP (ref 2–14)
MONOS+MACROS # FLD: 29 % — SIGNIFICANT CHANGE UP
MONOS+MACROS # FLD: 4 % — SIGNIFICANT CHANGE UP
MYELOCYTES NFR BLD: 0.9 % — HIGH (ref 0–0)
NEUTROPHILS # BLD AUTO: 6.31 K/UL — SIGNIFICANT CHANGE UP (ref 1.8–7.4)
NEUTROPHILS # BLD AUTO: 7.2 K/UL — SIGNIFICANT CHANGE UP (ref 1.8–7.4)
NEUTROPHILS NFR BLD AUTO: 80.3 % — HIGH (ref 43–77)
NEUTROPHILS NFR BLD AUTO: 82.4 % — HIGH (ref 43–77)
NEUTROPHILS-BODY FLUID: 16 % — SIGNIFICANT CHANGE UP
NEUTROPHILS-BODY FLUID: 95 % — SIGNIFICANT CHANGE UP
NEUTS BAND # BLD: 0.9 % — SIGNIFICANT CHANGE UP (ref 0–8)
NITRITE UR-MCNC: NEGATIVE — SIGNIFICANT CHANGE UP
NITRITE UR-MCNC: POSITIVE
NON-GYNECOLOGICAL CYTOLOGY STUDY: SIGNIFICANT CHANGE UP
NRBC # BLD: 0 /100 WBCS — SIGNIFICANT CHANGE UP (ref 0–0)
ORGANISM # SPEC MICROSCOPIC CNT: SIGNIFICANT CHANGE UP
OVALOCYTES BLD QL SMEAR: SLIGHT — SIGNIFICANT CHANGE UP
PH UR: 5 — SIGNIFICANT CHANGE UP (ref 5–8)
PH UR: 5.5 — SIGNIFICANT CHANGE UP (ref 5–8)
PHOSPHATE SERPL-MCNC: 1.8 MG/DL — LOW (ref 2.5–4.5)
PHOSPHATE SERPL-MCNC: 2.2 MG/DL — LOW (ref 2.5–4.5)
PHOSPHATE SERPL-MCNC: 2.5 MG/DL — SIGNIFICANT CHANGE UP (ref 2.5–4.5)
PHOSPHATE SERPL-MCNC: 2.7 MG/DL — SIGNIFICANT CHANGE UP (ref 2.5–4.5)
PHOSPHATE SERPL-MCNC: 2.7 MG/DL — SIGNIFICANT CHANGE UP (ref 2.5–4.5)
PHOSPHATE SERPL-MCNC: 3 MG/DL — SIGNIFICANT CHANGE UP (ref 2.5–4.5)
PHOSPHATE SERPL-MCNC: 3 MG/DL — SIGNIFICANT CHANGE UP (ref 2.5–4.5)
PHOSPHATE SERPL-MCNC: 3.1 MG/DL — SIGNIFICANT CHANGE UP (ref 2.5–4.5)
PHOSPHATE SERPL-MCNC: 3.1 MG/DL — SIGNIFICANT CHANGE UP (ref 2.5–4.5)
PHOSPHATE SERPL-MCNC: 3.2 MG/DL — SIGNIFICANT CHANGE UP (ref 2.5–4.5)
PHOSPHATE SERPL-MCNC: 3.2 MG/DL — SIGNIFICANT CHANGE UP (ref 2.5–4.5)
PHOSPHATE SERPL-MCNC: 3.4 MG/DL — SIGNIFICANT CHANGE UP (ref 2.5–4.5)
PHOSPHATE SERPL-MCNC: 3.5 MG/DL — SIGNIFICANT CHANGE UP (ref 2.5–4.5)
PHOSPHATE SERPL-MCNC: 3.5 MG/DL — SIGNIFICANT CHANGE UP (ref 2.5–4.5)
PHOSPHATE SERPL-MCNC: 3.6 MG/DL — SIGNIFICANT CHANGE UP (ref 2.5–4.5)
PHOSPHATE SERPL-MCNC: 3.7 MG/DL — SIGNIFICANT CHANGE UP (ref 2.5–4.5)
PLAT MORPH BLD: ABNORMAL
PLATELET # BLD AUTO: 132 K/UL — LOW (ref 150–400)
PLATELET # BLD AUTO: 136 K/UL — LOW (ref 150–400)
PLATELET # BLD AUTO: 139 K/UL — LOW (ref 150–400)
PLATELET # BLD AUTO: 143 K/UL — LOW (ref 150–400)
PLATELET # BLD AUTO: 147 K/UL — LOW (ref 150–400)
PLATELET # BLD AUTO: 151 K/UL — SIGNIFICANT CHANGE UP (ref 150–400)
PLATELET # BLD AUTO: 151 K/UL — SIGNIFICANT CHANGE UP (ref 150–400)
PLATELET # BLD AUTO: 152 K/UL — SIGNIFICANT CHANGE UP (ref 150–400)
PLATELET # BLD AUTO: 162 K/UL — SIGNIFICANT CHANGE UP (ref 150–400)
PLATELET # BLD AUTO: 163 K/UL — SIGNIFICANT CHANGE UP (ref 150–400)
PLATELET # BLD AUTO: 167 K/UL — SIGNIFICANT CHANGE UP (ref 150–400)
PLATELET # BLD AUTO: 183 K/UL — SIGNIFICANT CHANGE UP (ref 150–400)
PLATELET # BLD AUTO: 227 K/UL — SIGNIFICANT CHANGE UP (ref 150–400)
PLATELET # BLD AUTO: 241 K/UL — SIGNIFICANT CHANGE UP (ref 150–400)
PLATELET # BLD AUTO: 243 K/UL — SIGNIFICANT CHANGE UP (ref 150–400)
PLATELET # BLD AUTO: 253 K/UL — SIGNIFICANT CHANGE UP (ref 150–400)
PLATELET # BLD AUTO: 286 K/UL — SIGNIFICANT CHANGE UP (ref 150–400)
POIKILOCYTOSIS BLD QL AUTO: SLIGHT — SIGNIFICANT CHANGE UP
POLYCHROMASIA BLD QL SMEAR: SLIGHT — SIGNIFICANT CHANGE UP
POTASSIUM SERPL-MCNC: 3 MMOL/L — LOW (ref 3.5–5.3)
POTASSIUM SERPL-MCNC: 3.3 MMOL/L — LOW (ref 3.5–5.3)
POTASSIUM SERPL-MCNC: 3.4 MMOL/L — LOW (ref 3.5–5.3)
POTASSIUM SERPL-MCNC: 3.5 MMOL/L — SIGNIFICANT CHANGE UP (ref 3.5–5.3)
POTASSIUM SERPL-MCNC: 3.6 MMOL/L — SIGNIFICANT CHANGE UP (ref 3.5–5.3)
POTASSIUM SERPL-MCNC: 3.7 MMOL/L — SIGNIFICANT CHANGE UP (ref 3.5–5.3)
POTASSIUM SERPL-MCNC: 3.8 MMOL/L — SIGNIFICANT CHANGE UP (ref 3.5–5.3)
POTASSIUM SERPL-MCNC: 3.8 MMOL/L — SIGNIFICANT CHANGE UP (ref 3.5–5.3)
POTASSIUM SERPL-MCNC: 3.9 MMOL/L — SIGNIFICANT CHANGE UP (ref 3.5–5.3)
POTASSIUM SERPL-MCNC: 4.1 MMOL/L — SIGNIFICANT CHANGE UP (ref 3.5–5.3)
POTASSIUM SERPL-MCNC: 4.1 MMOL/L — SIGNIFICANT CHANGE UP (ref 3.5–5.3)
POTASSIUM SERPL-MCNC: 4.2 MMOL/L — SIGNIFICANT CHANGE UP (ref 3.5–5.3)
POTASSIUM SERPL-MCNC: 4.2 MMOL/L — SIGNIFICANT CHANGE UP (ref 3.5–5.3)
POTASSIUM SERPL-MCNC: 4.3 MMOL/L — SIGNIFICANT CHANGE UP (ref 3.5–5.3)
POTASSIUM SERPL-MCNC: SIGNIFICANT CHANGE UP (ref 3.5–5.3)
POTASSIUM SERPL-MCNC: SIGNIFICANT CHANGE UP MMOL/L (ref 3.5–5.3)
POTASSIUM SERPL-SCNC: 3 MMOL/L — LOW (ref 3.5–5.3)
POTASSIUM SERPL-SCNC: 3.3 MMOL/L — LOW (ref 3.5–5.3)
POTASSIUM SERPL-SCNC: 3.4 MMOL/L — LOW (ref 3.5–5.3)
POTASSIUM SERPL-SCNC: 3.5 MMOL/L — SIGNIFICANT CHANGE UP (ref 3.5–5.3)
POTASSIUM SERPL-SCNC: 3.6 MMOL/L — SIGNIFICANT CHANGE UP (ref 3.5–5.3)
POTASSIUM SERPL-SCNC: 3.7 MMOL/L — SIGNIFICANT CHANGE UP (ref 3.5–5.3)
POTASSIUM SERPL-SCNC: 3.8 MMOL/L — SIGNIFICANT CHANGE UP (ref 3.5–5.3)
POTASSIUM SERPL-SCNC: 3.8 MMOL/L — SIGNIFICANT CHANGE UP (ref 3.5–5.3)
POTASSIUM SERPL-SCNC: 3.9 MMOL/L — SIGNIFICANT CHANGE UP (ref 3.5–5.3)
POTASSIUM SERPL-SCNC: 4.1 MMOL/L — SIGNIFICANT CHANGE UP (ref 3.5–5.3)
POTASSIUM SERPL-SCNC: 4.1 MMOL/L — SIGNIFICANT CHANGE UP (ref 3.5–5.3)
POTASSIUM SERPL-SCNC: 4.2 MMOL/L — SIGNIFICANT CHANGE UP (ref 3.5–5.3)
POTASSIUM SERPL-SCNC: 4.2 MMOL/L — SIGNIFICANT CHANGE UP (ref 3.5–5.3)
POTASSIUM SERPL-SCNC: 4.3 MMOL/L — SIGNIFICANT CHANGE UP (ref 3.5–5.3)
POTASSIUM SERPL-SCNC: SIGNIFICANT CHANGE UP (ref 3.5–5.3)
POTASSIUM SERPL-SCNC: SIGNIFICANT CHANGE UP MMOL/L (ref 3.5–5.3)
PROT FLD-MCNC: 3.4 G/DL — SIGNIFICANT CHANGE UP
PROT SERPL-MCNC: 5.7 G/DL — LOW (ref 6–8.3)
PROT SERPL-MCNC: 5.7 G/DL — LOW (ref 6–8.3)
PROT SERPL-MCNC: 5.8 G/DL — LOW (ref 6–8.3)
PROT SERPL-MCNC: 5.9 G/DL — LOW (ref 6–8.3)
PROT SERPL-MCNC: 6 G/DL — SIGNIFICANT CHANGE UP (ref 6–8.3)
PROT SERPL-MCNC: 6 G/DL — SIGNIFICANT CHANGE UP (ref 6–8.3)
PROT SERPL-MCNC: 6.1 G/DL — SIGNIFICANT CHANGE UP (ref 6–8.3)
PROT SERPL-MCNC: 6.2 G/DL — SIGNIFICANT CHANGE UP (ref 6–8.3)
PROT SERPL-MCNC: 6.3 G/DL — SIGNIFICANT CHANGE UP (ref 6–8.3)
PROT SERPL-MCNC: 6.5 G/DL — SIGNIFICANT CHANGE UP (ref 6–8.3)
PROT SERPL-MCNC: 6.9 G/DL — SIGNIFICANT CHANGE UP (ref 6–8.3)
PROT SERPL-MCNC: 7.2 G/DL — SIGNIFICANT CHANGE UP (ref 6–8.3)
PROT SERPL-MCNC: SIGNIFICANT CHANGE UP (ref 6–8.3)
PROT UR-MCNC: 100 MG/DL
PROT UR-MCNC: NEGATIVE MG/DL — SIGNIFICANT CHANGE UP
PROTHROM AB SERPL-ACNC: 15.6 SEC — HIGH (ref 9.5–13)
PROTHROM AB SERPL-ACNC: 16.8 SEC — HIGH (ref 9.5–13)
PROTHROM AB SERPL-ACNC: 17 SEC — HIGH (ref 9.5–13)
PROTHROM AB SERPL-ACNC: 17.4 SEC — HIGH (ref 9.5–13)
PROTHROM AB SERPL-ACNC: 18.5 SEC — HIGH (ref 9.5–13)
PROTHROM AB SERPL-ACNC: 19 SEC — HIGH (ref 9.5–13)
PROTHROM AB SERPL-ACNC: 19.2 SEC — HIGH (ref 9.5–13)
RBC # BLD: 2.22 M/UL — LOW (ref 4.2–5.8)
RBC # BLD: 2.35 M/UL — LOW (ref 4.2–5.8)
RBC # BLD: 2.43 M/UL — LOW (ref 4.2–5.8)
RBC # BLD: 2.47 M/UL — LOW (ref 4.2–5.8)
RBC # BLD: 2.57 M/UL — LOW (ref 4.2–5.8)
RBC # BLD: 2.59 M/UL — LOW (ref 4.2–5.8)
RBC # BLD: 2.62 M/UL — LOW (ref 4.2–5.8)
RBC # BLD: 2.7 M/UL — LOW (ref 4.2–5.8)
RBC # BLD: 2.8 M/UL — LOW (ref 4.2–5.8)
RBC # BLD: 2.8 M/UL — LOW (ref 4.2–5.8)
RBC # BLD: 2.93 M/UL — LOW (ref 4.2–5.8)
RBC # BLD: 2.96 M/UL — LOW (ref 4.2–5.8)
RBC # BLD: 3.01 M/UL — LOW (ref 4.2–5.8)
RBC # BLD: 3.02 M/UL — LOW (ref 4.2–5.8)
RBC # BLD: 3.04 M/UL — LOW (ref 4.2–5.8)
RBC # BLD: 3.08 M/UL — LOW (ref 4.2–5.8)
RBC # BLD: 3.42 M/UL — LOW (ref 4.2–5.8)
RBC # FLD: 20.9 % — HIGH (ref 10.3–14.5)
RBC # FLD: 21 % — HIGH (ref 10.3–14.5)
RBC # FLD: 21.2 % — HIGH (ref 10.3–14.5)
RBC # FLD: 21.3 % — HIGH (ref 10.3–14.5)
RBC # FLD: 21.5 % — HIGH (ref 10.3–14.5)
RBC # FLD: 21.7 % — HIGH (ref 10.3–14.5)
RBC # FLD: 21.8 % — HIGH (ref 10.3–14.5)
RBC # FLD: 22.1 % — HIGH (ref 10.3–14.5)
RBC # FLD: 22.5 % — HIGH (ref 10.3–14.5)
RBC # FLD: 22.8 % — HIGH (ref 10.3–14.5)
RBC # FLD: 22.9 % — HIGH (ref 10.3–14.5)
RBC # FLD: 23.2 % — HIGH (ref 10.3–14.5)
RBC # FLD: 23.3 % — HIGH (ref 10.3–14.5)
RBC # FLD: 24.1 % — HIGH (ref 10.3–14.5)
RBC # FLD: 24.2 % — HIGH (ref 10.3–14.5)
RBC BLD AUTO: ABNORMAL
RBC CASTS # UR COMP ASSIST: < 5 /HPF — SIGNIFICANT CHANGE UP
RCV VOL RI: 9000 /UL — HIGH (ref 0–0)
RCV VOL RI: HIGH /UL (ref 0–0)
RH IG SCN BLD-IMP: POSITIVE — SIGNIFICANT CHANGE UP
SCHISTOCYTES BLD QL AUTO: SLIGHT — SIGNIFICANT CHANGE UP
SODIUM SERPL-SCNC: 130 MMOL/L — LOW (ref 135–145)
SODIUM SERPL-SCNC: 130 MMOL/L — LOW (ref 135–145)
SODIUM SERPL-SCNC: 135 MMOL/L — SIGNIFICANT CHANGE UP (ref 135–145)
SODIUM SERPL-SCNC: 136 MMOL/L — SIGNIFICANT CHANGE UP (ref 135–145)
SODIUM SERPL-SCNC: 136 MMOL/L — SIGNIFICANT CHANGE UP (ref 135–145)
SODIUM SERPL-SCNC: 137 MMOL/L — SIGNIFICANT CHANGE UP (ref 135–145)
SODIUM SERPL-SCNC: 138 MMOL/L — SIGNIFICANT CHANGE UP (ref 135–145)
SODIUM SERPL-SCNC: 138 MMOL/L — SIGNIFICANT CHANGE UP (ref 135–145)
SODIUM SERPL-SCNC: 139 MMOL/L — SIGNIFICANT CHANGE UP (ref 135–145)
SODIUM SERPL-SCNC: 140 MMOL/L — SIGNIFICANT CHANGE UP (ref 135–145)
SODIUM SERPL-SCNC: 140 MMOL/L — SIGNIFICANT CHANGE UP (ref 135–145)
SODIUM SERPL-SCNC: 141 MMOL/L — SIGNIFICANT CHANGE UP (ref 135–145)
SODIUM SERPL-SCNC: 143 MMOL/L — SIGNIFICANT CHANGE UP (ref 135–145)
SODIUM SERPL-SCNC: 143 MMOL/L — SIGNIFICANT CHANGE UP (ref 135–145)
SODIUM SERPL-SCNC: SIGNIFICANT CHANGE UP (ref 135–145)
SP GR SPEC: 1.01 — SIGNIFICANT CHANGE UP (ref 1–1.03)
SP GR SPEC: 1.02 — SIGNIFICANT CHANGE UP (ref 1–1.03)
SPECIMEN SOURCE FLD: SIGNIFICANT CHANGE UP
SPECIMEN SOURCE: SIGNIFICANT CHANGE UP
TOTAL NUCLEATED CELL COUNT, BODY FLUID: 121 /UL — SIGNIFICANT CHANGE UP
TOTAL NUCLEATED CELL COUNT, BODY FLUID: 2910 /UL — SIGNIFICANT CHANGE UP
TROPONIN T, HIGH SENSITIVITY RESULT: 87 NG/L — CRITICAL HIGH (ref 0–51)
TUBE TYPE: SIGNIFICANT CHANGE UP
TUBE TYPE: SIGNIFICANT CHANGE UP
UROBILINOGEN FLD QL: 0.2 E.U./DL — SIGNIFICANT CHANGE UP
UROBILINOGEN FLD QL: 2 E.U./DL
WBC # BLD: 10.65 K/UL — HIGH (ref 3.8–10.5)
WBC # BLD: 11.03 K/UL — HIGH (ref 3.8–10.5)
WBC # BLD: 11.21 K/UL — HIGH (ref 3.8–10.5)
WBC # BLD: 11.54 K/UL — HIGH (ref 3.8–10.5)
WBC # BLD: 11.87 K/UL — HIGH (ref 3.8–10.5)
WBC # BLD: 12.67 K/UL — HIGH (ref 3.8–10.5)
WBC # BLD: 12.83 K/UL — HIGH (ref 3.8–10.5)
WBC # BLD: 13.32 K/UL — HIGH (ref 3.8–10.5)
WBC # BLD: 13.47 K/UL — HIGH (ref 3.8–10.5)
WBC # BLD: 13.74 K/UL — HIGH (ref 3.8–10.5)
WBC # BLD: 13.89 K/UL — HIGH (ref 3.8–10.5)
WBC # BLD: 14.92 K/UL — HIGH (ref 3.8–10.5)
WBC # BLD: 7.58 K/UL — SIGNIFICANT CHANGE UP (ref 3.8–10.5)
WBC # BLD: 7.93 K/UL — SIGNIFICANT CHANGE UP (ref 3.8–10.5)
WBC # BLD: 8.97 K/UL — SIGNIFICANT CHANGE UP (ref 3.8–10.5)
WBC # BLD: 9.03 K/UL — SIGNIFICANT CHANGE UP (ref 3.8–10.5)
WBC # BLD: 9.37 K/UL — SIGNIFICANT CHANGE UP (ref 3.8–10.5)
WBC # FLD AUTO: 10.65 K/UL — HIGH (ref 3.8–10.5)
WBC # FLD AUTO: 11.03 K/UL — HIGH (ref 3.8–10.5)
WBC # FLD AUTO: 11.21 K/UL — HIGH (ref 3.8–10.5)
WBC # FLD AUTO: 11.54 K/UL — HIGH (ref 3.8–10.5)
WBC # FLD AUTO: 11.87 K/UL — HIGH (ref 3.8–10.5)
WBC # FLD AUTO: 12.67 K/UL — HIGH (ref 3.8–10.5)
WBC # FLD AUTO: 12.83 K/UL — HIGH (ref 3.8–10.5)
WBC # FLD AUTO: 13.32 K/UL — HIGH (ref 3.8–10.5)
WBC # FLD AUTO: 13.47 K/UL — HIGH (ref 3.8–10.5)
WBC # FLD AUTO: 13.74 K/UL — HIGH (ref 3.8–10.5)
WBC # FLD AUTO: 13.89 K/UL — HIGH (ref 3.8–10.5)
WBC # FLD AUTO: 14.92 K/UL — HIGH (ref 3.8–10.5)
WBC # FLD AUTO: 7.58 K/UL — SIGNIFICANT CHANGE UP (ref 3.8–10.5)
WBC # FLD AUTO: 7.93 K/UL — SIGNIFICANT CHANGE UP (ref 3.8–10.5)
WBC # FLD AUTO: 8.97 K/UL — SIGNIFICANT CHANGE UP (ref 3.8–10.5)
WBC # FLD AUTO: 9.03 K/UL — SIGNIFICANT CHANGE UP (ref 3.8–10.5)
WBC # FLD AUTO: 9.37 K/UL — SIGNIFICANT CHANGE UP (ref 3.8–10.5)
WBC UR QL: > 10 /HPF

## 2023-01-01 PROCEDURE — 76700 US EXAM ABDOM COMPLETE: CPT

## 2023-01-01 PROCEDURE — 99231 SBSQ HOSP IP/OBS SF/LOW 25: CPT

## 2023-01-01 PROCEDURE — 88341 IMHCHEM/IMCYTCHM EA ADD ANTB: CPT

## 2023-01-01 PROCEDURE — 88341 IMHCHEM/IMCYTCHM EA ADD ANTB: CPT | Mod: 26

## 2023-01-01 PROCEDURE — P9016: CPT

## 2023-01-01 PROCEDURE — 84157 ASSAY OF PROTEIN OTHER: CPT

## 2023-01-01 PROCEDURE — 71045 X-RAY EXAM CHEST 1 VIEW: CPT | Mod: 26

## 2023-01-01 PROCEDURE — 99232 SBSQ HOSP IP/OBS MODERATE 35: CPT

## 2023-01-01 PROCEDURE — 36415 COLL VENOUS BLD VENIPUNCTURE: CPT

## 2023-01-01 PROCEDURE — P9047: CPT

## 2023-01-01 PROCEDURE — 88305 TISSUE EXAM BY PATHOLOGIST: CPT | Mod: 26

## 2023-01-01 PROCEDURE — 49083 ABD PARACENTESIS W/IMAGING: CPT

## 2023-01-01 PROCEDURE — 89051 BODY FLUID CELL COUNT: CPT

## 2023-01-01 PROCEDURE — 84132 ASSAY OF SERUM POTASSIUM: CPT

## 2023-01-01 PROCEDURE — 83735 ASSAY OF MAGNESIUM: CPT

## 2023-01-01 PROCEDURE — 99233 SBSQ HOSP IP/OBS HIGH 50: CPT

## 2023-01-01 PROCEDURE — 76700 US EXAM ABDOM COMPLETE: CPT | Mod: 26

## 2023-01-01 PROCEDURE — 87181 SC STD AGAR DILUTION PER AGT: CPT

## 2023-01-01 PROCEDURE — P9059: CPT

## 2023-01-01 PROCEDURE — C1887: CPT

## 2023-01-01 PROCEDURE — 88112 CYTOPATH CELL ENHANCE TECH: CPT

## 2023-01-01 PROCEDURE — 87186 SC STD MICRODIL/AGAR DIL: CPT

## 2023-01-01 PROCEDURE — 74019 RADEX ABDOMEN 2 VIEWS: CPT | Mod: 26

## 2023-01-01 PROCEDURE — 99222 1ST HOSP IP/OBS MODERATE 55: CPT | Mod: GC

## 2023-01-01 PROCEDURE — 84484 ASSAY OF TROPONIN QUANT: CPT

## 2023-01-01 PROCEDURE — 83605 ASSAY OF LACTIC ACID: CPT

## 2023-01-01 PROCEDURE — 99221 1ST HOSP IP/OBS SF/LOW 40: CPT

## 2023-01-01 PROCEDURE — 82248 BILIRUBIN DIRECT: CPT

## 2023-01-01 PROCEDURE — 86850 RBC ANTIBODY SCREEN: CPT

## 2023-01-01 PROCEDURE — 88305 TISSUE EXAM BY PATHOLOGIST: CPT

## 2023-01-01 PROCEDURE — 36000 PLACE NEEDLE IN VEIN: CPT

## 2023-01-01 PROCEDURE — P9011: CPT

## 2023-01-01 PROCEDURE — 87070 CULTURE OTHR SPECIMN AEROBIC: CPT

## 2023-01-01 PROCEDURE — 85025 COMPLETE CBC W/AUTO DIFF WBC: CPT

## 2023-01-01 PROCEDURE — 86901 BLOOD TYPING SEROLOGIC RH(D): CPT

## 2023-01-01 PROCEDURE — 85610 PROTHROMBIN TIME: CPT

## 2023-01-01 PROCEDURE — 99223 1ST HOSP IP/OBS HIGH 75: CPT

## 2023-01-01 PROCEDURE — 85730 THROMBOPLASTIN TIME PARTIAL: CPT

## 2023-01-01 PROCEDURE — 88112 CYTOPATH CELL ENHANCE TECH: CPT | Mod: 26

## 2023-01-01 PROCEDURE — 85027 COMPLETE CBC AUTOMATED: CPT

## 2023-01-01 PROCEDURE — 47534 PLMT BILIARY DRAINAGE CATH: CPT

## 2023-01-01 PROCEDURE — 87086 URINE CULTURE/COLONY COUNT: CPT

## 2023-01-01 PROCEDURE — 99285 EMERGENCY DEPT VISIT HI MDM: CPT | Mod: 25

## 2023-01-01 PROCEDURE — 87075 CULTR BACTERIA EXCEPT BLOOD: CPT

## 2023-01-01 PROCEDURE — 74177 CT ABD & PELVIS W/CONTRAST: CPT | Mod: 26,MA

## 2023-01-01 PROCEDURE — 99285 EMERGENCY DEPT VISIT HI MDM: CPT

## 2023-01-01 PROCEDURE — C1729: CPT

## 2023-01-01 PROCEDURE — 81003 URINALYSIS AUTO W/O SCOPE: CPT

## 2023-01-01 PROCEDURE — 74019 RADEX ABDOMEN 2 VIEWS: CPT

## 2023-01-01 PROCEDURE — 86803 HEPATITIS C AB TEST: CPT

## 2023-01-01 PROCEDURE — 80076 HEPATIC FUNCTION PANEL: CPT

## 2023-01-01 PROCEDURE — 86985 SPLIT BLOOD OR PRODUCTS: CPT

## 2023-01-01 PROCEDURE — 87040 BLOOD CULTURE FOR BACTERIA: CPT

## 2023-01-01 PROCEDURE — 81001 URINALYSIS AUTO W/SCOPE: CPT

## 2023-01-01 PROCEDURE — 36430 TRANSFUSION BLD/BLD COMPNT: CPT

## 2023-01-01 PROCEDURE — 97110 THERAPEUTIC EXERCISES: CPT

## 2023-01-01 PROCEDURE — 74177 CT ABD & PELVIS W/CONTRAST: CPT | Mod: MA

## 2023-01-01 PROCEDURE — 70450 CT HEAD/BRAIN W/O DYE: CPT

## 2023-01-01 PROCEDURE — 83690 ASSAY OF LIPASE: CPT

## 2023-01-01 PROCEDURE — 97530 THERAPEUTIC ACTIVITIES: CPT

## 2023-01-01 PROCEDURE — 87205 SMEAR GRAM STAIN: CPT

## 2023-01-01 PROCEDURE — 82550 ASSAY OF CK (CPK): CPT

## 2023-01-01 PROCEDURE — 97165 OT EVAL LOW COMPLEX 30 MIN: CPT

## 2023-01-01 PROCEDURE — 80053 COMPREHEN METABOLIC PANEL: CPT

## 2023-01-01 PROCEDURE — 86923 COMPATIBILITY TEST ELECTRIC: CPT

## 2023-01-01 PROCEDURE — 82140 ASSAY OF AMMONIA: CPT

## 2023-01-01 PROCEDURE — P9045: CPT

## 2023-01-01 PROCEDURE — 82962 GLUCOSE BLOOD TEST: CPT

## 2023-01-01 PROCEDURE — C1894: CPT

## 2023-01-01 PROCEDURE — 47531 INJECTION FOR CHOLANGIOGRAM: CPT

## 2023-01-01 PROCEDURE — 97161 PT EVAL LOW COMPLEX 20 MIN: CPT

## 2023-01-01 PROCEDURE — 83036 HEMOGLOBIN GLYCOSYLATED A1C: CPT

## 2023-01-01 PROCEDURE — 80048 BASIC METABOLIC PNL TOTAL CA: CPT

## 2023-01-01 PROCEDURE — 86900 BLOOD TYPING SEROLOGIC ABO: CPT

## 2023-01-01 PROCEDURE — 76937 US GUIDE VASCULAR ACCESS: CPT | Mod: 26

## 2023-01-01 PROCEDURE — C1769: CPT

## 2023-01-01 PROCEDURE — 70450 CT HEAD/BRAIN W/O DYE: CPT | Mod: 26

## 2023-01-01 PROCEDURE — 82945 GLUCOSE OTHER FLUID: CPT

## 2023-01-01 PROCEDURE — 84100 ASSAY OF PHOSPHORUS: CPT

## 2023-01-01 PROCEDURE — 71045 X-RAY EXAM CHEST 1 VIEW: CPT

## 2023-01-01 PROCEDURE — 88342 IMHCHEM/IMCYTCHM 1ST ANTB: CPT | Mod: 26

## 2023-01-01 RX ORDER — GABAPENTIN 400 MG/1
300 CAPSULE ORAL
Refills: 0 | Status: DISCONTINUED | OUTPATIENT
Start: 2023-01-01 | End: 2023-01-01

## 2023-01-01 RX ORDER — HYDROMORPHONE HYDROCHLORIDE 2 MG/ML
1 INJECTION INTRAMUSCULAR; INTRAVENOUS; SUBCUTANEOUS
Refills: 0 | Status: DISCONTINUED | OUTPATIENT
Start: 2023-01-01 | End: 2023-01-01

## 2023-01-01 RX ORDER — HYDROMORPHONE HYDROCHLORIDE 2 MG/ML
0.5 INJECTION INTRAMUSCULAR; INTRAVENOUS; SUBCUTANEOUS EVERY 4 HOURS
Refills: 0 | Status: DISCONTINUED | OUTPATIENT
Start: 2023-01-01 | End: 2023-01-01

## 2023-01-01 RX ORDER — INFLUENZA VIRUS VACCINE 15; 15; 15; 15 UG/.5ML; UG/.5ML; UG/.5ML; UG/.5ML
0.7 SUSPENSION INTRAMUSCULAR ONCE
Refills: 0 | Status: DISCONTINUED | OUTPATIENT
Start: 2023-01-01 | End: 2023-01-01

## 2023-01-01 RX ORDER — POTASSIUM CHLORIDE 20 MEQ
40 PACKET (EA) ORAL ONCE
Refills: 0 | Status: COMPLETED | OUTPATIENT
Start: 2023-01-01 | End: 2023-01-01

## 2023-01-01 RX ORDER — HYDROMORPHONE HYDROCHLORIDE 2 MG/ML
0.5 INJECTION INTRAMUSCULAR; INTRAVENOUS; SUBCUTANEOUS
Qty: 100 | Refills: 0 | Status: DISCONTINUED | OUTPATIENT
Start: 2023-01-01 | End: 2023-01-01

## 2023-01-01 RX ORDER — POTASSIUM CHLORIDE 20 MEQ
20 PACKET (EA) ORAL
Refills: 0 | Status: DISCONTINUED | OUTPATIENT
Start: 2023-01-01 | End: 2023-01-01

## 2023-01-01 RX ORDER — SODIUM CHLORIDE 9 MG/ML
1 INJECTION INTRAMUSCULAR; INTRAVENOUS; SUBCUTANEOUS
Refills: 0 | Status: DISCONTINUED | OUTPATIENT
Start: 2023-01-01 | End: 2023-01-01

## 2023-01-01 RX ORDER — FUROSEMIDE 40 MG
20 TABLET ORAL ONCE
Refills: 0 | Status: COMPLETED | OUTPATIENT
Start: 2023-01-01 | End: 2023-01-01

## 2023-01-01 RX ORDER — POLYETHYLENE GLYCOL 3350 17 G/17G
17 POWDER, FOR SOLUTION ORAL DAILY
Refills: 0 | Status: DISCONTINUED | OUTPATIENT
Start: 2023-01-01 | End: 2023-01-01

## 2023-01-01 RX ORDER — POTASSIUM CHLORIDE 20 MEQ
40 PACKET (EA) ORAL ONCE
Refills: 0 | Status: DISCONTINUED | OUTPATIENT
Start: 2023-01-01 | End: 2023-01-01

## 2023-01-01 RX ORDER — POTASSIUM PHOSPHATE, MONOBASIC POTASSIUM PHOSPHATE, DIBASIC 236; 224 MG/ML; MG/ML
15 INJECTION, SOLUTION INTRAVENOUS ONCE
Refills: 0 | Status: COMPLETED | OUTPATIENT
Start: 2023-01-01 | End: 2023-01-01

## 2023-01-01 RX ORDER — PIPERACILLIN AND TAZOBACTAM 4; .5 G/20ML; G/20ML
3.38 INJECTION, POWDER, LYOPHILIZED, FOR SOLUTION INTRAVENOUS ONCE
Refills: 0 | Status: COMPLETED | OUTPATIENT
Start: 2023-01-01 | End: 2023-01-01

## 2023-01-01 RX ORDER — LACTULOSE 10 G/15ML
20 SOLUTION ORAL ONCE
Refills: 0 | Status: COMPLETED | OUTPATIENT
Start: 2023-01-01 | End: 2023-01-01

## 2023-01-01 RX ORDER — POTASSIUM CHLORIDE 20 MEQ
10 PACKET (EA) ORAL
Refills: 0 | Status: COMPLETED | OUTPATIENT
Start: 2023-01-01 | End: 2023-01-01

## 2023-01-01 RX ORDER — UREA 15 G
15 POWDER IN PACKET (EA) ORAL DAILY
Refills: 0 | Status: DISCONTINUED | OUTPATIENT
Start: 2023-01-01 | End: 2023-01-01

## 2023-01-01 RX ORDER — LACTULOSE 10 G/15ML
200 SOLUTION ORAL ONCE
Refills: 0 | Status: COMPLETED | OUTPATIENT
Start: 2023-01-01 | End: 2023-01-01

## 2023-01-01 RX ORDER — CARVEDILOL PHOSPHATE 80 MG/1
6.25 CAPSULE, EXTENDED RELEASE ORAL EVERY 12 HOURS
Refills: 0 | Status: DISCONTINUED | OUTPATIENT
Start: 2023-01-01 | End: 2023-01-01

## 2023-01-01 RX ORDER — ERTAPENEM SODIUM 1 G/1
1 INJECTION, POWDER, LYOPHILIZED, FOR SOLUTION INTRAMUSCULAR; INTRAVENOUS EVERY 24 HOURS
Refills: 0 | Status: DISCONTINUED | OUTPATIENT
Start: 2023-01-01 | End: 2023-01-01

## 2023-01-01 RX ORDER — ENOXAPARIN SODIUM 100 MG/ML
40 INJECTION SUBCUTANEOUS EVERY 24 HOURS
Refills: 0 | Status: DISCONTINUED | OUTPATIENT
Start: 2023-01-01 | End: 2023-01-01

## 2023-01-01 RX ORDER — FLUCONAZOLE 150 MG/1
400 TABLET ORAL ONCE
Refills: 0 | Status: COMPLETED | OUTPATIENT
Start: 2023-01-01 | End: 2023-01-01

## 2023-01-01 RX ORDER — SODIUM CHLORIDE 9 MG/ML
1000 INJECTION, SOLUTION INTRAVENOUS
Refills: 0 | Status: DISCONTINUED | OUTPATIENT
Start: 2023-01-01 | End: 2023-01-01

## 2023-01-01 RX ORDER — DEXTROSE 50 % IN WATER 50 %
25 SYRINGE (ML) INTRAVENOUS ONCE
Refills: 0 | Status: DISCONTINUED | OUTPATIENT
Start: 2023-01-01 | End: 2023-01-01

## 2023-01-01 RX ORDER — AMLODIPINE BESYLATE 2.5 MG/1
10 TABLET ORAL DAILY
Refills: 0 | Status: DISCONTINUED | OUTPATIENT
Start: 2023-01-01 | End: 2023-01-01

## 2023-01-01 RX ORDER — MEROPENEM 1 G/30ML
1000 INJECTION INTRAVENOUS EVERY 6 HOURS
Refills: 0 | Status: DISCONTINUED | OUTPATIENT
Start: 2023-01-01 | End: 2023-01-01

## 2023-01-01 RX ORDER — MAGNESIUM SULFATE 500 MG/ML
2 VIAL (ML) INJECTION EVERY 6 HOURS
Refills: 0 | Status: COMPLETED | OUTPATIENT
Start: 2023-01-01 | End: 2023-01-01

## 2023-01-01 RX ORDER — ONDANSETRON 8 MG/1
4 TABLET, FILM COATED ORAL ONCE
Refills: 0 | Status: COMPLETED | OUTPATIENT
Start: 2023-01-01 | End: 2023-01-01

## 2023-01-01 RX ORDER — INSULIN LISPRO 100/ML
VIAL (ML) SUBCUTANEOUS
Refills: 0 | Status: DISCONTINUED | OUTPATIENT
Start: 2023-01-01 | End: 2023-01-01

## 2023-01-01 RX ORDER — ALBUMIN HUMAN 25 %
50 VIAL (ML) INTRAVENOUS ONCE
Refills: 0 | Status: COMPLETED | OUTPATIENT
Start: 2023-01-01 | End: 2023-01-01

## 2023-01-01 RX ORDER — DAPTOMYCIN 500 MG/10ML
800 INJECTION, POWDER, LYOPHILIZED, FOR SOLUTION INTRAVENOUS EVERY 24 HOURS
Refills: 0 | Status: COMPLETED | OUTPATIENT
Start: 2023-01-01 | End: 2023-01-01

## 2023-01-01 RX ORDER — SUCRALFATE 1 G
1 TABLET ORAL EVERY 6 HOURS
Refills: 0 | Status: DISCONTINUED | OUTPATIENT
Start: 2023-01-01 | End: 2023-01-01

## 2023-01-01 RX ORDER — ONDANSETRON 8 MG/1
4 TABLET, FILM COATED ORAL EVERY 6 HOURS
Refills: 0 | Status: DISCONTINUED | OUTPATIENT
Start: 2023-01-01 | End: 2023-01-01

## 2023-01-01 RX ORDER — AMLODIPINE BESYLATE 2.5 MG/1
5 TABLET ORAL DAILY
Refills: 0 | Status: DISCONTINUED | OUTPATIENT
Start: 2023-01-01 | End: 2023-01-01

## 2023-01-01 RX ORDER — MAGNESIUM SULFATE 500 MG/ML
1 VIAL (ML) INJECTION ONCE
Refills: 0 | Status: COMPLETED | OUTPATIENT
Start: 2023-01-01 | End: 2023-01-01

## 2023-01-01 RX ORDER — ONDANSETRON 8 MG/1
4 TABLET, FILM COATED ORAL EVERY 8 HOURS
Refills: 0 | Status: DISCONTINUED | OUTPATIENT
Start: 2023-01-01 | End: 2023-01-01

## 2023-01-01 RX ORDER — MIRTAZAPINE 45 MG/1
7.5 TABLET, ORALLY DISINTEGRATING ORAL AT BEDTIME
Refills: 0 | Status: DISCONTINUED | OUTPATIENT
Start: 2023-01-01 | End: 2023-01-01

## 2023-01-01 RX ORDER — ONDANSETRON 8 MG/1
4 TABLET, FILM COATED ORAL EVERY 6 HOURS
Refills: 0 | Status: COMPLETED | OUTPATIENT
Start: 2023-01-01 | End: 2023-01-01

## 2023-01-01 RX ORDER — AMLODIPINE BESYLATE 2.5 MG/1
5 TABLET ORAL ONCE
Refills: 0 | Status: COMPLETED | OUTPATIENT
Start: 2023-01-01 | End: 2023-01-01

## 2023-01-01 RX ORDER — DAPTOMYCIN 500 MG/10ML
800 INJECTION, POWDER, LYOPHILIZED, FOR SOLUTION INTRAVENOUS EVERY 24 HOURS
Refills: 0 | Status: DISCONTINUED | OUTPATIENT
Start: 2023-01-01 | End: 2023-01-01

## 2023-01-01 RX ORDER — ERTAPENEM SODIUM 1 G/1
1000 INJECTION, POWDER, LYOPHILIZED, FOR SOLUTION INTRAMUSCULAR; INTRAVENOUS EVERY 24 HOURS
Refills: 0 | Status: DISCONTINUED | OUTPATIENT
Start: 2023-01-01 | End: 2023-01-01

## 2023-01-01 RX ORDER — HYDROMORPHONE HYDROCHLORIDE 2 MG/ML
2 INJECTION INTRAMUSCULAR; INTRAVENOUS; SUBCUTANEOUS
Refills: 0 | Status: DISCONTINUED | OUTPATIENT
Start: 2023-01-01 | End: 2023-01-01

## 2023-01-01 RX ORDER — OLANZAPINE 15 MG/1
2.5 TABLET, FILM COATED ORAL ONCE
Refills: 0 | Status: COMPLETED | OUTPATIENT
Start: 2023-01-01 | End: 2023-01-01

## 2023-01-01 RX ORDER — POTASSIUM CHLORIDE 20 MEQ
20 PACKET (EA) ORAL ONCE
Refills: 0 | Status: COMPLETED | OUTPATIENT
Start: 2023-01-01 | End: 2023-01-01

## 2023-01-01 RX ORDER — HYDROMORPHONE HYDROCHLORIDE 2 MG/ML
0.5 INJECTION INTRAMUSCULAR; INTRAVENOUS; SUBCUTANEOUS ONCE
Refills: 0 | Status: DISCONTINUED | OUTPATIENT
Start: 2023-01-01 | End: 2023-01-01

## 2023-01-01 RX ORDER — POTASSIUM CHLORIDE 20 MEQ
40 PACKET (EA) ORAL EVERY 4 HOURS
Refills: 0 | Status: COMPLETED | OUTPATIENT
Start: 2023-01-01 | End: 2023-01-01

## 2023-01-01 RX ORDER — PIPERACILLIN AND TAZOBACTAM 4; .5 G/20ML; G/20ML
3.38 INJECTION, POWDER, LYOPHILIZED, FOR SOLUTION INTRAVENOUS EVERY 8 HOURS
Refills: 0 | Status: DISCONTINUED | OUTPATIENT
Start: 2023-01-01 | End: 2023-01-01

## 2023-01-01 RX ORDER — ONDANSETRON 8 MG/1
8 TABLET, FILM COATED ORAL EVERY 6 HOURS
Refills: 0 | Status: DISCONTINUED | OUTPATIENT
Start: 2023-01-01 | End: 2023-01-01

## 2023-01-01 RX ORDER — LANOLIN ALCOHOL/MO/W.PET/CERES
5 CREAM (GRAM) TOPICAL AT BEDTIME
Refills: 0 | Status: DISCONTINUED | OUTPATIENT
Start: 2023-01-01 | End: 2023-01-01

## 2023-01-01 RX ORDER — ALBUMIN HUMAN 25 %
250 VIAL (ML) INTRAVENOUS
Refills: 0 | Status: DISCONTINUED | OUTPATIENT
Start: 2023-01-01 | End: 2023-01-01

## 2023-01-01 RX ORDER — DEXTROSE 50 % IN WATER 50 %
15 SYRINGE (ML) INTRAVENOUS ONCE
Refills: 0 | Status: DISCONTINUED | OUTPATIENT
Start: 2023-01-01 | End: 2023-01-01

## 2023-01-01 RX ORDER — FLUCONAZOLE 150 MG/1
200 TABLET ORAL EVERY 24 HOURS
Refills: 0 | Status: DISCONTINUED | OUTPATIENT
Start: 2023-01-01 | End: 2023-01-01

## 2023-01-01 RX ORDER — INSULIN LISPRO 100/ML
VIAL (ML) SUBCUTANEOUS AT BEDTIME
Refills: 0 | Status: DISCONTINUED | OUTPATIENT
Start: 2023-01-01 | End: 2023-01-01

## 2023-01-01 RX ORDER — DEXTROSE 50 % IN WATER 50 %
12.5 SYRINGE (ML) INTRAVENOUS ONCE
Refills: 0 | Status: DISCONTINUED | OUTPATIENT
Start: 2023-01-01 | End: 2023-01-01

## 2023-01-01 RX ORDER — CHLORHEXIDINE GLUCONATE 213 G/1000ML
1 SOLUTION TOPICAL
Refills: 0 | Status: DISCONTINUED | OUTPATIENT
Start: 2023-01-01 | End: 2023-01-01

## 2023-01-01 RX ORDER — CARVEDILOL PHOSPHATE 80 MG/1
1 CAPSULE, EXTENDED RELEASE ORAL
Refills: 0 | DISCHARGE

## 2023-01-01 RX ORDER — LINEZOLID 600 MG/300ML
600 INJECTION, SOLUTION INTRAVENOUS EVERY 12 HOURS
Refills: 0 | Status: DISCONTINUED | OUTPATIENT
Start: 2023-01-01 | End: 2023-01-01

## 2023-01-01 RX ORDER — MEROPENEM 1 G/30ML
1000 INJECTION INTRAVENOUS EVERY 8 HOURS
Refills: 0 | Status: DISCONTINUED | OUTPATIENT
Start: 2023-01-01 | End: 2023-01-01

## 2023-01-01 RX ORDER — PANTOPRAZOLE SODIUM 20 MG/1
1 TABLET, DELAYED RELEASE ORAL
Refills: 0 | DISCHARGE

## 2023-01-01 RX ORDER — POTASSIUM CHLORIDE 20 MEQ
10 PACKET (EA) ORAL EVERY 4 HOURS
Refills: 0 | Status: COMPLETED | OUTPATIENT
Start: 2023-01-01 | End: 2023-01-01

## 2023-01-01 RX ORDER — GLUCAGON INJECTION, SOLUTION 0.5 MG/.1ML
1 INJECTION, SOLUTION SUBCUTANEOUS ONCE
Refills: 0 | Status: DISCONTINUED | OUTPATIENT
Start: 2023-01-01 | End: 2023-01-01

## 2023-01-01 RX ORDER — FLUCONAZOLE 150 MG/1
TABLET ORAL
Refills: 0 | Status: DISCONTINUED | OUTPATIENT
Start: 2023-01-01 | End: 2023-01-01

## 2023-01-01 RX ORDER — SODIUM CHLORIDE 9 MG/ML
500 INJECTION INTRAMUSCULAR; INTRAVENOUS; SUBCUTANEOUS ONCE
Refills: 0 | Status: COMPLETED | OUTPATIENT
Start: 2023-01-01 | End: 2023-01-01

## 2023-01-01 RX ORDER — MEROPENEM 1 G/30ML
1000 INJECTION INTRAVENOUS EVERY 8 HOURS
Refills: 0 | Status: COMPLETED | OUTPATIENT
Start: 2023-01-01 | End: 2023-01-01

## 2023-01-01 RX ORDER — MAGNESIUM SULFATE 500 MG/ML
2 VIAL (ML) INJECTION ONCE
Refills: 0 | Status: COMPLETED | OUTPATIENT
Start: 2023-01-01 | End: 2023-01-01

## 2023-01-01 RX ORDER — HYDRALAZINE HCL 50 MG
10 TABLET ORAL ONCE
Refills: 0 | Status: COMPLETED | OUTPATIENT
Start: 2023-01-01 | End: 2023-01-01

## 2023-01-01 RX ORDER — PANTOPRAZOLE SODIUM 20 MG/1
40 TABLET, DELAYED RELEASE ORAL EVERY 12 HOURS
Refills: 0 | Status: DISCONTINUED | OUTPATIENT
Start: 2023-01-01 | End: 2023-01-01

## 2023-01-01 RX ADMIN — ONDANSETRON 4 MILLIGRAM(S): 8 TABLET, FILM COATED ORAL at 15:01

## 2023-01-01 RX ADMIN — HYDROMORPHONE HYDROCHLORIDE 0.5 MILLIGRAM(S): 2 INJECTION INTRAMUSCULAR; INTRAVENOUS; SUBCUTANEOUS at 10:45

## 2023-01-01 RX ADMIN — Medication 25 GRAM(S): at 02:31

## 2023-01-01 RX ADMIN — PANTOPRAZOLE SODIUM 40 MILLIGRAM(S): 20 TABLET, DELAYED RELEASE ORAL at 05:35

## 2023-01-01 RX ADMIN — Medication 5 MILLIGRAM(S): at 22:04

## 2023-01-01 RX ADMIN — HYDROMORPHONE HYDROCHLORIDE 0.5 MILLIGRAM(S): 2 INJECTION INTRAMUSCULAR; INTRAVENOUS; SUBCUTANEOUS at 22:49

## 2023-01-01 RX ADMIN — CARVEDILOL PHOSPHATE 6.25 MILLIGRAM(S): 80 CAPSULE, EXTENDED RELEASE ORAL at 17:50

## 2023-01-01 RX ADMIN — SODIUM CHLORIDE 1000 MILLILITER(S): 9 INJECTION INTRAMUSCULAR; INTRAVENOUS; SUBCUTANEOUS at 05:46

## 2023-01-01 RX ADMIN — FLUCONAZOLE 100 MILLIGRAM(S): 150 TABLET ORAL at 11:52

## 2023-01-01 RX ADMIN — MEROPENEM 100 MILLIGRAM(S): 1 INJECTION INTRAVENOUS at 03:33

## 2023-01-01 RX ADMIN — Medication 100 MILLIEQUIVALENT(S): at 00:25

## 2023-01-01 RX ADMIN — HYDROMORPHONE HYDROCHLORIDE 0.5 MILLIGRAM(S): 2 INJECTION INTRAMUSCULAR; INTRAVENOUS; SUBCUTANEOUS at 17:46

## 2023-01-01 RX ADMIN — PIPERACILLIN AND TAZOBACTAM 25 GRAM(S): 4; .5 INJECTION, POWDER, LYOPHILIZED, FOR SOLUTION INTRAVENOUS at 02:20

## 2023-01-01 RX ADMIN — MEROPENEM 100 MILLIGRAM(S): 1 INJECTION INTRAVENOUS at 11:52

## 2023-01-01 RX ADMIN — HYDROMORPHONE HYDROCHLORIDE 2 MILLIGRAM(S): 2 INJECTION INTRAMUSCULAR; INTRAVENOUS; SUBCUTANEOUS at 08:47

## 2023-01-01 RX ADMIN — MEROPENEM 100 MILLIGRAM(S): 1 INJECTION INTRAVENOUS at 12:45

## 2023-01-01 RX ADMIN — CARVEDILOL PHOSPHATE 6.25 MILLIGRAM(S): 80 CAPSULE, EXTENDED RELEASE ORAL at 06:13

## 2023-01-01 RX ADMIN — Medication 1 GRAM(S): at 06:44

## 2023-01-01 RX ADMIN — AMLODIPINE BESYLATE 10 MILLIGRAM(S): 2.5 TABLET ORAL at 06:39

## 2023-01-01 RX ADMIN — CARVEDILOL PHOSPHATE 6.25 MILLIGRAM(S): 80 CAPSULE, EXTENDED RELEASE ORAL at 17:19

## 2023-01-01 RX ADMIN — Medication 25 GRAM(S): at 11:14

## 2023-01-01 RX ADMIN — HYDROMORPHONE HYDROCHLORIDE 0.5 MILLIGRAM(S): 2 INJECTION INTRAMUSCULAR; INTRAVENOUS; SUBCUTANEOUS at 11:19

## 2023-01-01 RX ADMIN — SODIUM CHLORIDE 120 MILLILITER(S): 9 INJECTION, SOLUTION INTRAVENOUS at 19:57

## 2023-01-01 RX ADMIN — FLUCONAZOLE 100 MILLIGRAM(S): 150 TABLET ORAL at 11:45

## 2023-01-01 RX ADMIN — MEROPENEM 100 MILLIGRAM(S): 1 INJECTION INTRAVENOUS at 17:43

## 2023-01-01 RX ADMIN — MEROPENEM 100 MILLIGRAM(S): 1 INJECTION INTRAVENOUS at 09:59

## 2023-01-01 RX ADMIN — Medication 1 GRAM(S): at 01:27

## 2023-01-01 RX ADMIN — Medication 1 GRAM(S): at 17:25

## 2023-01-01 RX ADMIN — CARVEDILOL PHOSPHATE 6.25 MILLIGRAM(S): 80 CAPSULE, EXTENDED RELEASE ORAL at 05:25

## 2023-01-01 RX ADMIN — Medication 10 MILLIGRAM(S): at 18:32

## 2023-01-01 RX ADMIN — DAPTOMYCIN 132 MILLIGRAM(S): 500 INJECTION, POWDER, LYOPHILIZED, FOR SOLUTION INTRAVENOUS at 19:15

## 2023-01-01 RX ADMIN — MEROPENEM 100 MILLIGRAM(S): 1 INJECTION INTRAVENOUS at 01:52

## 2023-01-01 RX ADMIN — DAPTOMYCIN 132 MILLIGRAM(S): 500 INJECTION, POWDER, LYOPHILIZED, FOR SOLUTION INTRAVENOUS at 18:30

## 2023-01-01 RX ADMIN — MEROPENEM 100 MILLIGRAM(S): 1 INJECTION INTRAVENOUS at 09:33

## 2023-01-01 RX ADMIN — HYDROMORPHONE HYDROCHLORIDE 2 MILLIGRAM(S): 2 INJECTION INTRAMUSCULAR; INTRAVENOUS; SUBCUTANEOUS at 09:47

## 2023-01-01 RX ADMIN — SODIUM CHLORIDE 1 GRAM(S): 9 INJECTION INTRAMUSCULAR; INTRAVENOUS; SUBCUTANEOUS at 05:56

## 2023-01-01 RX ADMIN — SODIUM CHLORIDE 1 GRAM(S): 9 INJECTION INTRAMUSCULAR; INTRAVENOUS; SUBCUTANEOUS at 08:16

## 2023-01-01 RX ADMIN — LACTULOSE 200 GRAM(S): 10 SOLUTION ORAL at 11:35

## 2023-01-01 RX ADMIN — POLYETHYLENE GLYCOL 3350 17 GRAM(S): 17 POWDER, FOR SOLUTION ORAL at 11:52

## 2023-01-01 RX ADMIN — HYDROMORPHONE HYDROCHLORIDE 0.5 MILLIGRAM(S): 2 INJECTION INTRAMUSCULAR; INTRAVENOUS; SUBCUTANEOUS at 17:31

## 2023-01-01 RX ADMIN — ENOXAPARIN SODIUM 40 MILLIGRAM(S): 100 INJECTION SUBCUTANEOUS at 11:33

## 2023-01-01 RX ADMIN — SODIUM CHLORIDE 40 MILLILITER(S): 9 INJECTION, SOLUTION INTRAVENOUS at 21:00

## 2023-01-01 RX ADMIN — MEROPENEM 100 MILLIGRAM(S): 1 INJECTION INTRAVENOUS at 09:58

## 2023-01-01 RX ADMIN — PANTOPRAZOLE SODIUM 40 MILLIGRAM(S): 20 TABLET, DELAYED RELEASE ORAL at 18:54

## 2023-01-01 RX ADMIN — MEROPENEM 100 MILLIGRAM(S): 1 INJECTION INTRAVENOUS at 17:31

## 2023-01-01 RX ADMIN — PANTOPRAZOLE SODIUM 40 MILLIGRAM(S): 20 TABLET, DELAYED RELEASE ORAL at 22:38

## 2023-01-01 RX ADMIN — GABAPENTIN 300 MILLIGRAM(S): 400 CAPSULE ORAL at 06:44

## 2023-01-01 RX ADMIN — MEROPENEM 100 MILLIGRAM(S): 1 INJECTION INTRAVENOUS at 00:29

## 2023-01-01 RX ADMIN — Medication 2: at 12:41

## 2023-01-01 RX ADMIN — Medication 100 MILLIEQUIVALENT(S): at 18:26

## 2023-01-01 RX ADMIN — Medication 1 GRAM(S): at 05:34

## 2023-01-01 RX ADMIN — Medication 2: at 17:50

## 2023-01-01 RX ADMIN — Medication 1 GRAM(S): at 19:09

## 2023-01-01 RX ADMIN — SODIUM CHLORIDE 120 MILLILITER(S): 9 INJECTION, SOLUTION INTRAVENOUS at 04:54

## 2023-01-01 RX ADMIN — DAPTOMYCIN 132 MILLIGRAM(S): 500 INJECTION, POWDER, LYOPHILIZED, FOR SOLUTION INTRAVENOUS at 18:57

## 2023-01-01 RX ADMIN — CARVEDILOL PHOSPHATE 6.25 MILLIGRAM(S): 80 CAPSULE, EXTENDED RELEASE ORAL at 05:56

## 2023-01-01 RX ADMIN — PIPERACILLIN AND TAZOBACTAM 25 GRAM(S): 4; .5 INJECTION, POWDER, LYOPHILIZED, FOR SOLUTION INTRAVENOUS at 01:09

## 2023-01-01 RX ADMIN — SODIUM CHLORIDE 1 GRAM(S): 9 INJECTION INTRAMUSCULAR; INTRAVENOUS; SUBCUTANEOUS at 18:47

## 2023-01-01 RX ADMIN — GABAPENTIN 300 MILLIGRAM(S): 400 CAPSULE ORAL at 18:29

## 2023-01-01 RX ADMIN — CHLORHEXIDINE GLUCONATE 1 APPLICATION(S): 213 SOLUTION TOPICAL at 05:27

## 2023-01-01 RX ADMIN — MEROPENEM 100 MILLIGRAM(S): 1 INJECTION INTRAVENOUS at 01:28

## 2023-01-01 RX ADMIN — HYDROMORPHONE HYDROCHLORIDE 2 MILLIGRAM(S): 2 INJECTION INTRAMUSCULAR; INTRAVENOUS; SUBCUTANEOUS at 04:59

## 2023-01-01 RX ADMIN — ENOXAPARIN SODIUM 40 MILLIGRAM(S): 100 INJECTION SUBCUTANEOUS at 07:49

## 2023-01-01 RX ADMIN — DAPTOMYCIN 132 MILLIGRAM(S): 500 INJECTION, POWDER, LYOPHILIZED, FOR SOLUTION INTRAVENOUS at 22:35

## 2023-01-01 RX ADMIN — PIPERACILLIN AND TAZOBACTAM 200 GRAM(S): 4; .5 INJECTION, POWDER, LYOPHILIZED, FOR SOLUTION INTRAVENOUS at 15:02

## 2023-01-01 RX ADMIN — Medication 50 MILLILITER(S): at 15:01

## 2023-01-01 RX ADMIN — Medication 1 GRAM(S): at 16:00

## 2023-01-01 RX ADMIN — CARVEDILOL PHOSPHATE 6.25 MILLIGRAM(S): 80 CAPSULE, EXTENDED RELEASE ORAL at 06:10

## 2023-01-01 RX ADMIN — CARVEDILOL PHOSPHATE 6.25 MILLIGRAM(S): 80 CAPSULE, EXTENDED RELEASE ORAL at 19:08

## 2023-01-01 RX ADMIN — AMLODIPINE BESYLATE 10 MILLIGRAM(S): 2.5 TABLET ORAL at 08:14

## 2023-01-01 RX ADMIN — LINEZOLID 600 MILLIGRAM(S): 600 INJECTION, SOLUTION INTRAVENOUS at 22:41

## 2023-01-01 RX ADMIN — CHLORHEXIDINE GLUCONATE 1 APPLICATION(S): 213 SOLUTION TOPICAL at 07:09

## 2023-01-01 RX ADMIN — Medication 0.5 MILLIGRAM(S): at 03:49

## 2023-01-01 RX ADMIN — Medication 100 MILLIEQUIVALENT(S): at 22:50

## 2023-01-01 RX ADMIN — HYDROMORPHONE HYDROCHLORIDE 0.5 MG/HR: 2 INJECTION INTRAMUSCULAR; INTRAVENOUS; SUBCUTANEOUS at 12:48

## 2023-01-01 RX ADMIN — Medication 15 GRAM(S): at 12:27

## 2023-01-01 RX ADMIN — DAPTOMYCIN 132 MILLIGRAM(S): 500 INJECTION, POWDER, LYOPHILIZED, FOR SOLUTION INTRAVENOUS at 19:47

## 2023-01-01 RX ADMIN — HYDROMORPHONE HYDROCHLORIDE 0.5 MILLIGRAM(S): 2 INJECTION INTRAMUSCULAR; INTRAVENOUS; SUBCUTANEOUS at 11:00

## 2023-01-01 RX ADMIN — MEROPENEM 100 MILLIGRAM(S): 1 INJECTION INTRAVENOUS at 18:36

## 2023-01-01 RX ADMIN — Medication 5 MILLIGRAM(S): at 21:29

## 2023-01-01 RX ADMIN — Medication 5 MILLIGRAM(S): at 23:04

## 2023-01-01 RX ADMIN — POLYETHYLENE GLYCOL 3350 17 GRAM(S): 17 POWDER, FOR SOLUTION ORAL at 18:26

## 2023-01-01 RX ADMIN — SODIUM CHLORIDE 60 MILLILITER(S): 9 INJECTION, SOLUTION INTRAVENOUS at 17:02

## 2023-01-01 RX ADMIN — HYDROMORPHONE HYDROCHLORIDE 0.5 MILLIGRAM(S): 2 INJECTION INTRAMUSCULAR; INTRAVENOUS; SUBCUTANEOUS at 22:22

## 2023-01-01 RX ADMIN — DAPTOMYCIN 132 MILLIGRAM(S): 500 INJECTION, POWDER, LYOPHILIZED, FOR SOLUTION INTRAVENOUS at 18:47

## 2023-01-01 RX ADMIN — HYDROMORPHONE HYDROCHLORIDE 0.5 MILLIGRAM(S): 2 INJECTION INTRAMUSCULAR; INTRAVENOUS; SUBCUTANEOUS at 06:25

## 2023-01-01 RX ADMIN — Medication 1 GRAM(S): at 19:54

## 2023-01-01 RX ADMIN — HYDROMORPHONE HYDROCHLORIDE 0.5 MILLIGRAM(S): 2 INJECTION INTRAMUSCULAR; INTRAVENOUS; SUBCUTANEOUS at 05:53

## 2023-01-01 RX ADMIN — Medication 1 GRAM(S): at 00:40

## 2023-01-01 RX ADMIN — Medication 1 GRAM(S): at 06:38

## 2023-01-01 RX ADMIN — PIPERACILLIN AND TAZOBACTAM 25 GRAM(S): 4; .5 INJECTION, POWDER, LYOPHILIZED, FOR SOLUTION INTRAVENOUS at 09:40

## 2023-01-01 RX ADMIN — CHLORHEXIDINE GLUCONATE 1 APPLICATION(S): 213 SOLUTION TOPICAL at 08:15

## 2023-01-01 RX ADMIN — ONDANSETRON 4 MILLIGRAM(S): 8 TABLET, FILM COATED ORAL at 22:01

## 2023-01-01 RX ADMIN — SODIUM CHLORIDE 120 MILLILITER(S): 9 INJECTION, SOLUTION INTRAVENOUS at 09:51

## 2023-01-01 RX ADMIN — ENOXAPARIN SODIUM 40 MILLIGRAM(S): 100 INJECTION SUBCUTANEOUS at 06:13

## 2023-01-01 RX ADMIN — MEROPENEM 100 MILLIGRAM(S): 1 INJECTION INTRAVENOUS at 01:04

## 2023-01-01 RX ADMIN — Medication 2: at 17:23

## 2023-01-01 RX ADMIN — HYDROMORPHONE HYDROCHLORIDE 0.5 MILLIGRAM(S): 2 INJECTION INTRAMUSCULAR; INTRAVENOUS; SUBCUTANEOUS at 22:30

## 2023-01-01 RX ADMIN — HYDROMORPHONE HYDROCHLORIDE 0.5 MILLIGRAM(S): 2 INJECTION INTRAMUSCULAR; INTRAVENOUS; SUBCUTANEOUS at 02:30

## 2023-01-01 RX ADMIN — CHLORHEXIDINE GLUCONATE 1 APPLICATION(S): 213 SOLUTION TOPICAL at 05:43

## 2023-01-01 RX ADMIN — LACTULOSE 20 GRAM(S): 10 SOLUTION ORAL at 10:28

## 2023-01-01 RX ADMIN — MEROPENEM 100 MILLIGRAM(S): 1 INJECTION INTRAVENOUS at 02:19

## 2023-01-01 RX ADMIN — MEROPENEM 100 MILLIGRAM(S): 1 INJECTION INTRAVENOUS at 17:19

## 2023-01-01 RX ADMIN — ENOXAPARIN SODIUM 40 MILLIGRAM(S): 100 INJECTION SUBCUTANEOUS at 06:05

## 2023-01-01 RX ADMIN — Medication 100 MILLIEQUIVALENT(S): at 21:00

## 2023-01-01 RX ADMIN — Medication 40 MILLIEQUIVALENT(S): at 15:41

## 2023-01-01 RX ADMIN — Medication 1 GRAM(S): at 23:57

## 2023-01-01 RX ADMIN — HYDROMORPHONE HYDROCHLORIDE 0.5 MILLIGRAM(S): 2 INJECTION INTRAMUSCULAR; INTRAVENOUS; SUBCUTANEOUS at 05:47

## 2023-01-01 RX ADMIN — Medication 62.5 MILLIMOLE(S): at 05:12

## 2023-01-01 RX ADMIN — CARVEDILOL PHOSPHATE 6.25 MILLIGRAM(S): 80 CAPSULE, EXTENDED RELEASE ORAL at 07:50

## 2023-01-01 RX ADMIN — HYDROMORPHONE HYDROCHLORIDE 2 MILLIGRAM(S): 2 INJECTION INTRAMUSCULAR; INTRAVENOUS; SUBCUTANEOUS at 02:55

## 2023-01-01 RX ADMIN — CARVEDILOL PHOSPHATE 6.25 MILLIGRAM(S): 80 CAPSULE, EXTENDED RELEASE ORAL at 18:47

## 2023-01-01 RX ADMIN — Medication 100 MILLIEQUIVALENT(S): at 17:19

## 2023-01-01 RX ADMIN — GABAPENTIN 300 MILLIGRAM(S): 400 CAPSULE ORAL at 19:09

## 2023-01-01 RX ADMIN — PANTOPRAZOLE SODIUM 40 MILLIGRAM(S): 20 TABLET, DELAYED RELEASE ORAL at 07:49

## 2023-01-01 RX ADMIN — Medication 1 GRAM(S): at 12:41

## 2023-01-01 RX ADMIN — OLANZAPINE 2.5 MILLIGRAM(S): 15 TABLET, FILM COATED ORAL at 21:29

## 2023-01-01 RX ADMIN — DAPTOMYCIN 132 MILLIGRAM(S): 500 INJECTION, POWDER, LYOPHILIZED, FOR SOLUTION INTRAVENOUS at 22:03

## 2023-01-01 RX ADMIN — ENOXAPARIN SODIUM 40 MILLIGRAM(S): 100 INJECTION SUBCUTANEOUS at 06:45

## 2023-01-01 RX ADMIN — Medication 202 MILLIGRAM(S): at 23:40

## 2023-01-01 RX ADMIN — Medication 5 MILLIGRAM(S): at 23:21

## 2023-01-01 RX ADMIN — Medication 10 MILLIGRAM(S): at 18:47

## 2023-01-01 RX ADMIN — PANTOPRAZOLE SODIUM 40 MILLIGRAM(S): 20 TABLET, DELAYED RELEASE ORAL at 17:06

## 2023-01-01 RX ADMIN — CARVEDILOL PHOSPHATE 6.25 MILLIGRAM(S): 80 CAPSULE, EXTENDED RELEASE ORAL at 06:39

## 2023-01-01 RX ADMIN — HYDROMORPHONE HYDROCHLORIDE 0.5 MILLIGRAM(S): 2 INJECTION INTRAMUSCULAR; INTRAVENOUS; SUBCUTANEOUS at 21:25

## 2023-01-01 RX ADMIN — CHLORHEXIDINE GLUCONATE 1 APPLICATION(S): 213 SOLUTION TOPICAL at 06:13

## 2023-01-01 RX ADMIN — FLUCONAZOLE 100 MILLIGRAM(S): 150 TABLET ORAL at 12:00

## 2023-01-01 RX ADMIN — PIPERACILLIN AND TAZOBACTAM 25 GRAM(S): 4; .5 INJECTION, POWDER, LYOPHILIZED, FOR SOLUTION INTRAVENOUS at 17:47

## 2023-01-01 RX ADMIN — CARVEDILOL PHOSPHATE 6.25 MILLIGRAM(S): 80 CAPSULE, EXTENDED RELEASE ORAL at 17:31

## 2023-01-01 RX ADMIN — Medication 100 MILLIEQUIVALENT(S): at 12:42

## 2023-01-01 RX ADMIN — MEROPENEM 100 MILLIGRAM(S): 1 INJECTION INTRAVENOUS at 18:47

## 2023-01-01 RX ADMIN — SODIUM CHLORIDE 1 GRAM(S): 9 INJECTION INTRAMUSCULAR; INTRAVENOUS; SUBCUTANEOUS at 17:04

## 2023-01-01 RX ADMIN — CARVEDILOL PHOSPHATE 6.25 MILLIGRAM(S): 80 CAPSULE, EXTENDED RELEASE ORAL at 19:47

## 2023-01-01 RX ADMIN — Medication 1 GRAM(S): at 06:13

## 2023-01-01 RX ADMIN — HYDROMORPHONE HYDROCHLORIDE 0.5 MILLIGRAM(S): 2 INJECTION INTRAMUSCULAR; INTRAVENOUS; SUBCUTANEOUS at 03:19

## 2023-01-01 RX ADMIN — Medication 100 MILLIEQUIVALENT(S): at 11:14

## 2023-01-01 RX ADMIN — HYDROMORPHONE HYDROCHLORIDE 0.5 MILLIGRAM(S): 2 INJECTION INTRAMUSCULAR; INTRAVENOUS; SUBCUTANEOUS at 17:40

## 2023-01-01 RX ADMIN — Medication 15 GRAM(S): at 11:01

## 2023-01-01 RX ADMIN — AMLODIPINE BESYLATE 5 MILLIGRAM(S): 2.5 TABLET ORAL at 09:23

## 2023-01-01 RX ADMIN — DAPTOMYCIN 132 MILLIGRAM(S): 500 INJECTION, POWDER, LYOPHILIZED, FOR SOLUTION INTRAVENOUS at 18:41

## 2023-01-01 RX ADMIN — MEROPENEM 100 MILLIGRAM(S): 1 INJECTION INTRAVENOUS at 18:23

## 2023-01-01 RX ADMIN — PANTOPRAZOLE SODIUM 40 MILLIGRAM(S): 20 TABLET, DELAYED RELEASE ORAL at 06:39

## 2023-01-01 RX ADMIN — CARVEDILOL PHOSPHATE 6.25 MILLIGRAM(S): 80 CAPSULE, EXTENDED RELEASE ORAL at 05:08

## 2023-01-01 RX ADMIN — ENOXAPARIN SODIUM 40 MILLIGRAM(S): 100 INJECTION SUBCUTANEOUS at 09:00

## 2023-01-01 RX ADMIN — SODIUM CHLORIDE 1 GRAM(S): 9 INJECTION INTRAMUSCULAR; INTRAVENOUS; SUBCUTANEOUS at 19:09

## 2023-01-01 RX ADMIN — PANTOPRAZOLE SODIUM 40 MILLIGRAM(S): 20 TABLET, DELAYED RELEASE ORAL at 18:47

## 2023-01-01 RX ADMIN — CHLORHEXIDINE GLUCONATE 1 APPLICATION(S): 213 SOLUTION TOPICAL at 06:40

## 2023-01-01 RX ADMIN — AMLODIPINE BESYLATE 5 MILLIGRAM(S): 2.5 TABLET ORAL at 11:52

## 2023-01-01 RX ADMIN — SODIUM CHLORIDE 1 GRAM(S): 9 INJECTION INTRAMUSCULAR; INTRAVENOUS; SUBCUTANEOUS at 06:39

## 2023-01-01 RX ADMIN — Medication 5 MILLIGRAM(S): at 21:55

## 2023-01-01 RX ADMIN — HYDROMORPHONE HYDROCHLORIDE 0.5 MILLIGRAM(S): 2 INJECTION INTRAMUSCULAR; INTRAVENOUS; SUBCUTANEOUS at 17:25

## 2023-01-01 RX ADMIN — PANTOPRAZOLE SODIUM 40 MILLIGRAM(S): 20 TABLET, DELAYED RELEASE ORAL at 05:56

## 2023-01-01 RX ADMIN — ONDANSETRON 4 MILLIGRAM(S): 8 TABLET, FILM COATED ORAL at 19:55

## 2023-01-01 RX ADMIN — PANTOPRAZOLE SODIUM 40 MILLIGRAM(S): 20 TABLET, DELAYED RELEASE ORAL at 06:14

## 2023-01-01 RX ADMIN — ENOXAPARIN SODIUM 40 MILLIGRAM(S): 100 INJECTION SUBCUTANEOUS at 06:38

## 2023-01-01 RX ADMIN — POTASSIUM PHOSPHATE, MONOBASIC POTASSIUM PHOSPHATE, DIBASIC 62.5 MILLIMOLE(S): 236; 224 INJECTION, SOLUTION INTRAVENOUS at 15:28

## 2023-01-01 RX ADMIN — HYDROMORPHONE HYDROCHLORIDE 0.5 MILLIGRAM(S): 2 INJECTION INTRAMUSCULAR; INTRAVENOUS; SUBCUTANEOUS at 11:44

## 2023-01-01 RX ADMIN — SODIUM CHLORIDE 1 GRAM(S): 9 INJECTION INTRAMUSCULAR; INTRAVENOUS; SUBCUTANEOUS at 05:08

## 2023-01-01 RX ADMIN — LACTULOSE 200 GRAM(S): 10 SOLUTION ORAL at 13:07

## 2023-01-01 RX ADMIN — PIPERACILLIN AND TAZOBACTAM 25 GRAM(S): 4; .5 INJECTION, POWDER, LYOPHILIZED, FOR SOLUTION INTRAVENOUS at 08:56

## 2023-01-01 RX ADMIN — HYDROMORPHONE HYDROCHLORIDE 0.5 MILLIGRAM(S): 2 INJECTION INTRAMUSCULAR; INTRAVENOUS; SUBCUTANEOUS at 06:10

## 2023-01-01 RX ADMIN — GABAPENTIN 300 MILLIGRAM(S): 400 CAPSULE ORAL at 05:56

## 2023-01-01 RX ADMIN — MEROPENEM 100 MILLIGRAM(S): 1 INJECTION INTRAVENOUS at 09:54

## 2023-01-01 RX ADMIN — Medication 0.5 MILLIGRAM(S): at 09:38

## 2023-01-01 RX ADMIN — POLYETHYLENE GLYCOL 3350 17 GRAM(S): 17 POWDER, FOR SOLUTION ORAL at 12:41

## 2023-01-01 RX ADMIN — ENOXAPARIN SODIUM 40 MILLIGRAM(S): 100 INJECTION SUBCUTANEOUS at 17:26

## 2023-01-01 RX ADMIN — CHLORHEXIDINE GLUCONATE 1 APPLICATION(S): 213 SOLUTION TOPICAL at 05:12

## 2023-01-01 RX ADMIN — Medication 15 GRAM(S): at 11:53

## 2023-01-01 RX ADMIN — HYDROMORPHONE HYDROCHLORIDE 0.5 MILLIGRAM(S): 2 INJECTION INTRAMUSCULAR; INTRAVENOUS; SUBCUTANEOUS at 22:39

## 2023-01-01 RX ADMIN — ENOXAPARIN SODIUM 40 MILLIGRAM(S): 100 INJECTION SUBCUTANEOUS at 18:47

## 2023-01-01 RX ADMIN — Medication 1 GRAM(S): at 05:56

## 2023-01-01 RX ADMIN — Medication 40 MILLIEQUIVALENT(S): at 11:14

## 2023-01-01 RX ADMIN — Medication 1 GRAM(S): at 00:15

## 2023-01-01 RX ADMIN — PIPERACILLIN AND TAZOBACTAM 25 GRAM(S): 4; .5 INJECTION, POWDER, LYOPHILIZED, FOR SOLUTION INTRAVENOUS at 00:00

## 2023-01-01 RX ADMIN — CARVEDILOL PHOSPHATE 6.25 MILLIGRAM(S): 80 CAPSULE, EXTENDED RELEASE ORAL at 05:51

## 2023-01-01 RX ADMIN — HYDROMORPHONE HYDROCHLORIDE 2 MILLIGRAM(S): 2 INJECTION INTRAMUSCULAR; INTRAVENOUS; SUBCUTANEOUS at 10:33

## 2023-01-01 RX ADMIN — Medication 2: at 17:32

## 2023-01-01 RX ADMIN — Medication 40 MILLIEQUIVALENT(S): at 17:13

## 2023-01-01 RX ADMIN — HYDROMORPHONE HYDROCHLORIDE 0.5 MILLIGRAM(S): 2 INJECTION INTRAMUSCULAR; INTRAVENOUS; SUBCUTANEOUS at 06:38

## 2023-01-01 RX ADMIN — Medication 1 GRAM(S): at 00:41

## 2023-01-01 RX ADMIN — CHLORHEXIDINE GLUCONATE 1 APPLICATION(S): 213 SOLUTION TOPICAL at 10:49

## 2023-01-01 RX ADMIN — Medication 100 MILLIEQUIVALENT(S): at 15:51

## 2023-01-01 RX ADMIN — CARVEDILOL PHOSPHATE 6.25 MILLIGRAM(S): 80 CAPSULE, EXTENDED RELEASE ORAL at 17:13

## 2023-01-01 RX ADMIN — PIPERACILLIN AND TAZOBACTAM 25 GRAM(S): 4; .5 INJECTION, POWDER, LYOPHILIZED, FOR SOLUTION INTRAVENOUS at 17:12

## 2023-01-01 RX ADMIN — MEROPENEM 100 MILLIGRAM(S): 1 INJECTION INTRAVENOUS at 18:15

## 2023-01-01 RX ADMIN — HYDROMORPHONE HYDROCHLORIDE 1 MILLIGRAM(S): 2 INJECTION INTRAMUSCULAR; INTRAVENOUS; SUBCUTANEOUS at 11:54

## 2023-01-01 RX ADMIN — Medication 1 GRAM(S): at 09:58

## 2023-01-01 RX ADMIN — AMLODIPINE BESYLATE 5 MILLIGRAM(S): 2.5 TABLET ORAL at 06:13

## 2023-01-01 RX ADMIN — Medication 1 GRAM(S): at 04:12

## 2023-01-01 RX ADMIN — MEROPENEM 100 MILLIGRAM(S): 1 INJECTION INTRAVENOUS at 17:05

## 2023-01-01 RX ADMIN — FLUCONAZOLE 100 MILLIGRAM(S): 150 TABLET ORAL at 12:52

## 2023-01-01 RX ADMIN — Medication 10 MILLIGRAM(S): at 06:05

## 2023-01-01 RX ADMIN — Medication 0.5 MILLIGRAM(S): at 23:18

## 2023-01-01 RX ADMIN — Medication 15 GRAM(S): at 12:41

## 2023-01-01 RX ADMIN — CHLORHEXIDINE GLUCONATE 1 APPLICATION(S): 213 SOLUTION TOPICAL at 05:16

## 2023-01-01 RX ADMIN — SODIUM CHLORIDE 1 GRAM(S): 9 INJECTION INTRAMUSCULAR; INTRAVENOUS; SUBCUTANEOUS at 06:14

## 2023-01-01 RX ADMIN — ERTAPENEM SODIUM 120 MILLIGRAM(S): 1 INJECTION, POWDER, LYOPHILIZED, FOR SOLUTION INTRAMUSCULAR; INTRAVENOUS at 00:42

## 2023-01-01 RX ADMIN — PIPERACILLIN AND TAZOBACTAM 25 GRAM(S): 4; .5 INJECTION, POWDER, LYOPHILIZED, FOR SOLUTION INTRAVENOUS at 09:44

## 2023-01-01 RX ADMIN — SODIUM CHLORIDE 120 MILLILITER(S): 9 INJECTION, SOLUTION INTRAVENOUS at 01:16

## 2023-01-01 RX ADMIN — Medication 5 MILLIGRAM(S): at 21:40

## 2023-01-01 RX ADMIN — Medication 1 GRAM(S): at 23:41

## 2023-01-01 RX ADMIN — Medication 100 MILLIEQUIVALENT(S): at 10:55

## 2023-01-01 RX ADMIN — SODIUM CHLORIDE 1 GRAM(S): 9 INJECTION INTRAMUSCULAR; INTRAVENOUS; SUBCUTANEOUS at 06:44

## 2023-01-01 RX ADMIN — Medication 25 GRAM(S): at 07:06

## 2023-01-01 RX ADMIN — Medication 5 MILLIGRAM(S): at 22:50

## 2023-01-01 RX ADMIN — MIRTAZAPINE 7.5 MILLIGRAM(S): 45 TABLET, ORALLY DISINTEGRATING ORAL at 21:41

## 2023-01-01 RX ADMIN — POLYETHYLENE GLYCOL 3350 17 GRAM(S): 17 POWDER, FOR SOLUTION ORAL at 11:01

## 2023-01-01 RX ADMIN — PIPERACILLIN AND TAZOBACTAM 25 GRAM(S): 4; .5 INJECTION, POWDER, LYOPHILIZED, FOR SOLUTION INTRAVENOUS at 01:15

## 2023-01-01 RX ADMIN — OLANZAPINE 2.5 MILLIGRAM(S): 15 TABLET, FILM COATED ORAL at 03:30

## 2023-01-01 RX ADMIN — Medication 1 GRAM(S): at 21:40

## 2023-01-01 RX ADMIN — PANTOPRAZOLE SODIUM 40 MILLIGRAM(S): 20 TABLET, DELAYED RELEASE ORAL at 05:11

## 2023-01-01 RX ADMIN — MIRTAZAPINE 7.5 MILLIGRAM(S): 45 TABLET, ORALLY DISINTEGRATING ORAL at 21:40

## 2023-01-01 RX ADMIN — Medication 40 MILLIEQUIVALENT(S): at 12:40

## 2023-01-01 RX ADMIN — HYDROMORPHONE HYDROCHLORIDE 0.5 MILLIGRAM(S): 2 INJECTION INTRAMUSCULAR; INTRAVENOUS; SUBCUTANEOUS at 03:31

## 2023-01-01 RX ADMIN — SODIUM CHLORIDE 120 MILLILITER(S): 9 INJECTION, SOLUTION INTRAVENOUS at 14:38

## 2023-01-01 RX ADMIN — CHLORHEXIDINE GLUCONATE 1 APPLICATION(S): 213 SOLUTION TOPICAL at 05:36

## 2023-01-01 RX ADMIN — CARVEDILOL PHOSPHATE 6.25 MILLIGRAM(S): 80 CAPSULE, EXTENDED RELEASE ORAL at 17:43

## 2023-01-01 RX ADMIN — HYDROMORPHONE HYDROCHLORIDE 2 MILLIGRAM(S): 2 INJECTION INTRAMUSCULAR; INTRAVENOUS; SUBCUTANEOUS at 06:36

## 2023-01-01 RX ADMIN — MIRTAZAPINE 7.5 MILLIGRAM(S): 45 TABLET, ORALLY DISINTEGRATING ORAL at 00:16

## 2023-01-01 RX ADMIN — HYDROMORPHONE HYDROCHLORIDE 2 MILLIGRAM(S): 2 INJECTION INTRAMUSCULAR; INTRAVENOUS; SUBCUTANEOUS at 09:33

## 2023-01-01 RX ADMIN — PANTOPRAZOLE SODIUM 40 MILLIGRAM(S): 20 TABLET, DELAYED RELEASE ORAL at 17:43

## 2023-01-01 RX ADMIN — SODIUM CHLORIDE 1 GRAM(S): 9 INJECTION INTRAMUSCULAR; INTRAVENOUS; SUBCUTANEOUS at 17:19

## 2023-01-01 RX ADMIN — SODIUM CHLORIDE 1 GRAM(S): 9 INJECTION INTRAMUSCULAR; INTRAVENOUS; SUBCUTANEOUS at 05:35

## 2023-01-01 RX ADMIN — Medication 50 MILLIEQUIVALENT(S): at 10:15

## 2023-01-01 RX ADMIN — Medication 1 GRAM(S): at 18:29

## 2023-01-01 RX ADMIN — Medication 5 MILLIGRAM(S): at 01:00

## 2023-01-01 RX ADMIN — FLUCONAZOLE 100 MILLIGRAM(S): 150 TABLET ORAL at 11:01

## 2023-01-01 RX ADMIN — PIPERACILLIN AND TAZOBACTAM 25 GRAM(S): 4; .5 INJECTION, POWDER, LYOPHILIZED, FOR SOLUTION INTRAVENOUS at 18:47

## 2023-01-01 RX ADMIN — MEROPENEM 100 MILLIGRAM(S): 1 INJECTION INTRAVENOUS at 10:16

## 2023-01-01 RX ADMIN — PANTOPRAZOLE SODIUM 40 MILLIGRAM(S): 20 TABLET, DELAYED RELEASE ORAL at 06:44

## 2023-01-01 RX ADMIN — PANTOPRAZOLE SODIUM 40 MILLIGRAM(S): 20 TABLET, DELAYED RELEASE ORAL at 18:28

## 2023-01-01 RX ADMIN — PANTOPRAZOLE SODIUM 40 MILLIGRAM(S): 20 TABLET, DELAYED RELEASE ORAL at 05:08

## 2023-01-01 RX ADMIN — SODIUM CHLORIDE 120 MILLILITER(S): 9 INJECTION, SOLUTION INTRAVENOUS at 00:31

## 2023-01-01 RX ADMIN — SODIUM CHLORIDE 1 GRAM(S): 9 INJECTION INTRAMUSCULAR; INTRAVENOUS; SUBCUTANEOUS at 18:29

## 2023-01-01 RX ADMIN — HYDROMORPHONE HYDROCHLORIDE 2 MILLIGRAM(S): 2 INJECTION INTRAMUSCULAR; INTRAVENOUS; SUBCUTANEOUS at 21:30

## 2023-01-01 RX ADMIN — PANTOPRAZOLE SODIUM 40 MILLIGRAM(S): 20 TABLET, DELAYED RELEASE ORAL at 18:15

## 2023-01-01 RX ADMIN — ENOXAPARIN SODIUM 40 MILLIGRAM(S): 100 INJECTION SUBCUTANEOUS at 09:59

## 2023-01-01 RX ADMIN — Medication 100 MILLIEQUIVALENT(S): at 19:55

## 2023-01-01 RX ADMIN — HYDROMORPHONE HYDROCHLORIDE 1 MILLIGRAM(S): 2 INJECTION INTRAMUSCULAR; INTRAVENOUS; SUBCUTANEOUS at 12:09

## 2023-01-01 RX ADMIN — GABAPENTIN 300 MILLIGRAM(S): 400 CAPSULE ORAL at 07:48

## 2023-01-01 RX ADMIN — PIPERACILLIN AND TAZOBACTAM 25 GRAM(S): 4; .5 INJECTION, POWDER, LYOPHILIZED, FOR SOLUTION INTRAVENOUS at 19:36

## 2023-01-01 RX ADMIN — Medication 1 GRAM(S): at 11:53

## 2023-01-01 RX ADMIN — SODIUM CHLORIDE 1 GRAM(S): 9 INJECTION INTRAMUSCULAR; INTRAVENOUS; SUBCUTANEOUS at 18:49

## 2023-01-01 RX ADMIN — Medication 40 MILLIEQUIVALENT(S): at 09:33

## 2023-01-01 RX ADMIN — FLUCONAZOLE 100 MILLIGRAM(S): 150 TABLET ORAL at 00:16

## 2023-01-01 RX ADMIN — Medication 15 GRAM(S): at 11:35

## 2023-01-01 RX ADMIN — HYDROMORPHONE HYDROCHLORIDE 0.5 MILLIGRAM(S): 2 INJECTION INTRAMUSCULAR; INTRAVENOUS; SUBCUTANEOUS at 02:40

## 2023-01-01 RX ADMIN — Medication 1 GRAM(S): at 11:37

## 2023-01-01 RX ADMIN — MEROPENEM 100 MILLIGRAM(S): 1 INJECTION INTRAVENOUS at 11:34

## 2023-01-01 RX ADMIN — Medication 1 GRAM(S): at 15:10

## 2023-01-01 RX ADMIN — HYDROMORPHONE HYDROCHLORIDE 2 MILLIGRAM(S): 2 INJECTION INTRAMUSCULAR; INTRAVENOUS; SUBCUTANEOUS at 07:06

## 2023-01-01 RX ADMIN — MEROPENEM 100 MILLIGRAM(S): 1 INJECTION INTRAVENOUS at 02:11

## 2023-01-01 RX ADMIN — MIRTAZAPINE 7.5 MILLIGRAM(S): 45 TABLET, ORALLY DISINTEGRATING ORAL at 23:39

## 2023-01-01 RX ADMIN — PANTOPRAZOLE SODIUM 40 MILLIGRAM(S): 20 TABLET, DELAYED RELEASE ORAL at 18:50

## 2023-01-01 RX ADMIN — HYDROMORPHONE HYDROCHLORIDE 0.5 MG/HR: 2 INJECTION INTRAMUSCULAR; INTRAVENOUS; SUBCUTANEOUS at 13:03

## 2023-01-01 RX ADMIN — AMLODIPINE BESYLATE 10 MILLIGRAM(S): 2.5 TABLET ORAL at 05:56

## 2023-01-01 RX ADMIN — Medication 100 GRAM(S): at 18:27

## 2023-01-01 RX ADMIN — ONDANSETRON 4 MILLIGRAM(S): 8 TABLET, FILM COATED ORAL at 18:26

## 2023-01-01 RX ADMIN — CHLORHEXIDINE GLUCONATE 1 APPLICATION(S): 213 SOLUTION TOPICAL at 05:33

## 2023-01-01 RX ADMIN — POLYETHYLENE GLYCOL 3350 17 GRAM(S): 17 POWDER, FOR SOLUTION ORAL at 17:23

## 2023-01-01 RX ADMIN — Medication 5 MILLIGRAM(S): at 21:39

## 2023-01-01 RX ADMIN — CARVEDILOL PHOSPHATE 6.25 MILLIGRAM(S): 80 CAPSULE, EXTENDED RELEASE ORAL at 17:05

## 2023-01-01 RX ADMIN — PIPERACILLIN AND TAZOBACTAM 25 GRAM(S): 4; .5 INJECTION, POWDER, LYOPHILIZED, FOR SOLUTION INTRAVENOUS at 00:49

## 2023-01-01 RX ADMIN — HYDROMORPHONE HYDROCHLORIDE 0.5 MILLIGRAM(S): 2 INJECTION INTRAMUSCULAR; INTRAVENOUS; SUBCUTANEOUS at 06:23

## 2023-01-01 RX ADMIN — Medication 20 MILLIGRAM(S): at 08:54

## 2023-01-01 RX ADMIN — SODIUM CHLORIDE 1 GRAM(S): 9 INJECTION INTRAMUSCULAR; INTRAVENOUS; SUBCUTANEOUS at 10:29

## 2023-01-01 RX ADMIN — GABAPENTIN 300 MILLIGRAM(S): 400 CAPSULE ORAL at 18:50

## 2023-01-01 RX ADMIN — CARVEDILOL PHOSPHATE 6.25 MILLIGRAM(S): 80 CAPSULE, EXTENDED RELEASE ORAL at 06:14

## 2023-01-01 RX ADMIN — CHLORHEXIDINE GLUCONATE 1 APPLICATION(S): 213 SOLUTION TOPICAL at 06:43

## 2023-01-01 RX ADMIN — Medication 1 GRAM(S): at 07:47

## 2023-01-01 RX ADMIN — GABAPENTIN 300 MILLIGRAM(S): 400 CAPSULE ORAL at 06:39

## 2023-01-01 RX ADMIN — Medication 100 MILLIEQUIVALENT(S): at 13:49

## 2023-01-01 RX ADMIN — CARVEDILOL PHOSPHATE 6.25 MILLIGRAM(S): 80 CAPSULE, EXTENDED RELEASE ORAL at 05:35

## 2023-01-01 RX ADMIN — POTASSIUM PHOSPHATE, MONOBASIC POTASSIUM PHOSPHATE, DIBASIC 62.5 MILLIMOLE(S): 236; 224 INJECTION, SOLUTION INTRAVENOUS at 16:50

## 2023-01-01 RX ADMIN — HYDROMORPHONE HYDROCHLORIDE 0.5 MILLIGRAM(S): 2 INJECTION INTRAMUSCULAR; INTRAVENOUS; SUBCUTANEOUS at 21:40

## 2023-01-01 RX ADMIN — Medication 5 MILLIGRAM(S): at 00:16

## 2023-01-01 RX ADMIN — Medication 2: at 06:52

## 2023-01-01 RX ADMIN — HYDROMORPHONE HYDROCHLORIDE 2 MILLIGRAM(S): 2 INJECTION INTRAMUSCULAR; INTRAVENOUS; SUBCUTANEOUS at 21:08

## 2023-01-01 RX ADMIN — Medication 1 GRAM(S): at 21:41

## 2023-09-10 NOTE — H&P ADULT - ASSESSMENT
68M with PMHx of HTN, GERD and recent diagnosis of stage IV pancreatic cancer with numerous liver metastasis who presents to [facility] from home for second opinion regarding treatmnet options. Patietn afebrile and HD stable on admission, but per outpatient report, pain had positive blood cultures for Klebsiella in the past. Will admit for observation and futher evalation.    Admit to regional, Dr. Gorman  Regular diet  No IVF  Zosyn  HSQ/SCDs/IS/OOBA  AM Labs  Pending conversation with chief and attending on call

## 2023-09-10 NOTE — PATIENT PROFILE ADULT - FUNCTIONAL ASSESSMENT - BASIC MOBILITY 6.
1-calculated by average/Not able to assess (calculate score using [facility] averaging method)

## 2023-09-10 NOTE — PATIENT PROFILE ADULT - VISION (WITH CORRECTIVE LENSES IF THE PATIENT USUALLY WEARS THEM):
wear eyeglasses for reading/Partially impaired: cannot see medication labels or newsprint, but can see obstacles in path, and the surrounding layout; can count fingers at arm's length

## 2023-09-10 NOTE — ED ADULT NURSE NOTE - OBJECTIVE STATEMENT
Patient c/o right sided abdominal pain, weakness, and worsening jaundice. Patient reports that he was diagnosed with pancreatic cancer with mets to liver about 1 month ago. Patient has right biliary drain to bulb suction and also reports having stents placed in liver. Patient has been receiving treatment at [facility] but states that he was told he is not candidate for chemo due to "high bilirubin". Patient and wife state that they came to [facility] for 2nd opinion and were told to ask for Dr. Harden. Patient c/o right sided abdominal pain, weakness, and worsening jaundice. Patient reports that he was diagnosed with pancreatic cancer with mets to liver about 1 month ago. Patient has right biliary drain to bulb suction and also reports having stents placed in liver. Patient has been receiving treatment at [facility] but states that he was told he is not candidate for chemo due to "high bilirubin" and was told that palliative care was only option. Patient and wife state that they came to [facility] for 2nd opinion and were told to ask for Dr. Gorman.

## 2023-09-10 NOTE — H&P ADULT - HISTORY OF PRESENT ILLNESS
Patient is a 68M with PMHx of HTN, GERD and recent diagnosis of stage IV pancreatic cancer with numerous liver metastasis who presents to [facility] from home for second opinion regarding treatmnet options. Patient reports he was in good health prior when he developed painless jaundice in the end of July with darkening urine and acholic stools. States he initially presented to [facility] and was diagnosed with stage IV adenocarcinoma. Reports he underwent ERCP with 2 biliary stents placed and was discharged to follow up with Dr. Ayala (oncologist at [facility]). Reports at presentation later in August to outpatient oncologist office, patient was noted to be hypotensive and febrile c/f cholangitis requiring hospital admission for posterior segment 6 liver abscess that was drained percutaneously (8/15). Reports additional endoscopy was performed that time and patient is unsure if stents were exchanged (per ERCP report, stents are 7Fr and occupy R anterior and posterior hepatic ducts). States he was admitted for 2-3 weeks and reports upon discharged he followed up with Dr. Goldberg on Thursday for second opinion. Patient reports today that he has been experiencing progressive fatigue and weakness, as well as anorexia 2/2 poor appetite. States he has become weak to the point where he cannot walk on his own and requires a cane. States he is now presenting to [facility] at referral of a friend requesting consultation with Dr. Gorman.     As per verbal report from outpatient oncologist, patient has had positive blood cultures for Klebsiella.     Last colonoscopy noted to be >5 years ago, but within normal limits.  Admits to family history of colon ca and leukemia in paternal grandfather. Denies family history of pancreatic cancer.    Medical History: HTN, GERD, pancreatic ca  Surgical History: L hip surgery  Medications: Coreg 12.5 qd, Pantoprazole 40 qd  Allergies: Denies, NKDA  Social History: 50+ pack year smoking history - quit 4 years ago. Denies alcohol or additional drug use. 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

## 2023-09-10 NOTE — H&P ADULT - NSHPPHYSICALEXAM_GEN_ALL_CORE
General: Jaundiced. Ill-appearing male resting in bed in NAD  Neuro: A&Ox3, no focal deficits  Pulm: Equal chest wall expansion b/l, no respiratory distress  CV: NSR  Abdomen: soft, distended, minimally tender. No prior surgical incision noted. Negative Bravo's sign. 10 Fr IR drain noted in RUQ flank with bilious output noted  Extremities: WWP, Mild pedal edema noted

## 2023-09-10 NOTE — ED PROVIDER NOTE - PHYSICAL EXAMINATION
CONSTITUTIONAL: jaundiced male, no distress  HEAD: Normocephalic; atraumatic.   EYES:  conjunctiva and sclera clear  ENT: normal nose; no rhinorrhea; normal pharynx with no erythema or lesions.   NECK: Supple; non-tender;   CARDIOVASCULAR: Normal S1, S2; no murmurs, rubs, or gallops. Regular rate and rhythm.   RESPIRATORY: Breathing easily; breath sounds clear and equal bilaterally; no wheezes, rhonchi, or rales.  GI: distended, drain in back w/ brown drainage  EXT: No cyanosis or edema; N/V intact  SKIN: jaundiced  NEURO: A & O x 3; face symmetric; grossly unremarkable.   PSYCHOLOGICAL: The patient’s mood and manner are appropriate.

## 2023-09-10 NOTE — ED PROVIDER NOTE - CARE PLAN
1 Principal Discharge DX:	Primary pancreatic cancer with metastasis to other site  Secondary Diagnosis:	Jaundice

## 2023-09-10 NOTE — ED ADULT TRIAGE NOTE - CHIEF COMPLAINT QUOTE
Pt presents to ED by EMS C/O ABD pain, jaundice, generalized weakness and wife states, " The Dr rec to come to the ED and said he has a bacterial infection, they said to see Dr. Harden". Pt has hx pancreatic CA with mets to liver. R surg drain in place.

## 2023-09-10 NOTE — ED ADULT NURSE NOTE - NSFALLRISKINTERV_ED_ALL_ED

## 2023-09-10 NOTE — PATIENT PROFILE ADULT - FALL HARM RISK - HARM RISK INTERVENTIONS

## 2023-09-11 NOTE — DIETITIAN INITIAL EVALUATION ADULT - PERSON TAUGHT/METHOD
Pt amenable to brief education; RD provided education in regards to the importance of adequate macro and micronutrients, as well as hydration to support ADLs, maintain energy levels and overall functional/nutritional status. General healthful education provided. Nutrient-dense foods promoted. RD emphasized the integral role that protein plays in expediting the wound healing process. Pt was receptive and verbalized understanding./verbal instruction/patient instructed/spouse instructed

## 2023-09-11 NOTE — DIETITIAN INITIAL EVALUATION ADULT - ADD RECOMMEND
1. Continue with current diet order (regular diet)  >>chocolate pudding nourishment 3x/day with meals (RD to input into foodservice system, kitchen is aware)  2. Encourage pt to meet nutritional needs as able   3. Monitor PO intakes, trend weights (weekly), monitor skin integrity, monitor labs (electrolytes, CMP), monitor GI fxn   4. Encourage adherence to diet education (reinforce as able)   5. Recommend multivitamin, Zinc 220mg/day and Vitamin C 500mg daily for wound healing   6. Pain and bowel regimen per team  7. Will continue to assess/honor preferences as able   8. Align nutrition interventions with goals of care at all times

## 2023-09-11 NOTE — DIETITIAN INITIAL EVALUATION ADULT - PERTINENT MEDS FT
MEDICATIONS  (STANDING):  carvedilol 6.25 milliGRAM(s) Oral every 12 hours  enoxaparin Injectable 40 milliGRAM(s) SubCutaneous every 24 hours  influenza  Vaccine (HIGH DOSE) 0.7 milliLiter(s) IntraMuscular once  melatonin 5 milliGRAM(s) Oral at bedtime  piperacillin/tazobactam IVPB.. 3.375 Gram(s) IV Intermittent every 8 hours  potassium chloride   Powder 40 milliEquivalent(s) Oral every 4 hours    MEDICATIONS  (PRN):  HYDROmorphone   Tablet 2 milliGRAM(s) Oral every 3 hours PRN Moderate Pain (4 - 6)

## 2023-09-11 NOTE — PROGRESS NOTE ADULT - SUBJECTIVE AND OBJECTIVE BOX
SUBJECTIVE: Pt seen and examined at bedside with chief. Pt complaints of intermittent chronic bandlike abd pain. Admits to some nausea but no vomiting. Admits to BF. Denies any fever, chills.    MEDICATIONS  (STANDING):  carvedilol 6.25 milliGRAM(s) Oral every 12 hours  enoxaparin Injectable 40 milliGRAM(s) SubCutaneous every 24 hours  influenza  Vaccine (HIGH DOSE) 0.7 milliLiter(s) IntraMuscular once  melatonin 5 milliGRAM(s) Oral at bedtime  piperacillin/tazobactam IVPB.- 3.375 Gram(s) IV Intermittent once  piperacillin/tazobactam IVPB.. 3.375 Gram(s) IV Intermittent every 8 hours    MEDICATIONS  (PRN):  HYDROmorphone   Tablet 2 milliGRAM(s) Oral every 3 hours PRN Moderate Pain (4 - 6)      Vital Signs Last 24 Hrs  T(C): 36.8 (11 Sep 2023 04:35), Max: 36.8 (10 Sep 2023 11:03)  T(F): 98.2 (11 Sep 2023 04:35), Max: 98.3 (10 Sep 2023 11:03)  HR: 65 (11 Sep 2023 04:35) (62 - 76)  BP: 105/59 (11 Sep 2023 04:35) (100/59 - 116/55)  BP(mean): 75 (10 Sep 2023 18:43) (75 - 75)  RR: 17 (11 Sep 2023 04:35) (17 - 18)  SpO2: 96% (11 Sep 2023 04:35) (96% - 98%)    Parameters below as of 11 Sep 2023 04:35  Patient On (Oxygen Delivery Method): room air        PHYSICAL EXAM:      Constitutional: A&Ox3, Jaundice    Respiratory: non labored breathing, no respiratory distress    Cardiovascular: NSR, RRR    Gastrointestinal: Soft, ND, NT. Pigtail drain with seropurulent output.     Genitourinary: Voiding     Extremities: (-) edema                  I&O's Detail    10 Sep 2023 07:01  -  11 Sep 2023 07:00  --------------------------------------------------------  IN:    IV PiggyBack: 25 mL    Oral Fluid: 240 mL  Total IN: 265 mL    OUT:    Voided (mL): 575 mL  Total OUT: 575 mL    Total NET: -310 mL          LABS:                        7.0    7.93  )-----------( 253      ( 11 Sep 2023 06:57 )             20.9     09-11    139  |  102  |  37<H>  ----------------------------<  128<H>  3.0<L>   |  24  |  1.78<H>    Ca    8.0<L>      11 Sep 2023 06:57  Phos  3.4     09-11  Mg     2.0     09-11    TPro  6.2  /  Alb  1.7<L>  /  TBili  12.0<H>  /  DBili  9.2<H>  /  AST  178<H>  /  ALT  55<H>  /  AlkPhos  610<H>  09-11    PT/INR - ( 10 Sep 2023 11:53 )   PT: 18.5 sec;   INR: 1.65          PTT - ( 10 Sep 2023 11:53 )  PTT:31.9 sec  Urinalysis Basic - ( 11 Sep 2023 06:57 )    Color: x / Appearance: x / SG: x / pH: x  Gluc: 128 mg/dL / Ketone: x  / Bili: x / Urobili: x   Blood: x / Protein: x / Nitrite: x   Leuk Esterase: x / RBC: x / WBC x   Sq Epi: x / Non Sq Epi: x / Bacteria: x        RADIOLOGY & ADDITIONAL STUDIES:

## 2023-09-11 NOTE — DIETITIAN NUTRITION RISK NOTIFICATION - ADDITIONAL COMMENTS/DIETITIAN RECOMMENDATIONS
Malnutrition...of severe degree in the context of chronic illness/injury related to inadequate PO intakes secondary to pt's condition/clinical status as evidenced by moderate to severe muscle/fat loss per NFPE, ~29% wt loss in <1 year, </=75% v4lcpds    goals: Pt to consistently meet at least 75% of EEE via tolerated route that is consistent with goals of care and will no longer exhibit malnutrition during hospital stay    Nutrition recommendations:  1. Continue with current diet order (regular diet)  >>chocolate pudding nourishment 3x/day with meals (RD to input into foodservice system, kitchen is aware)  2. Encourage pt to meet nutritional needs as able   3. Monitor PO intakes, trend weights (weekly), monitor skin integrity, monitor labs (electrolytes, CMP), monitor GI fxn   4. Encourage adherence to diet education (reinforce as able)   5. Recommend multivitamin, Zinc 220mg/day and Vitamin C 500mg daily for wound healing   6. Pain and bowel regimen per team  7. Will continue to assess/honor preferences as able   8. Align nutrition interventions with goals of care at all times

## 2023-09-11 NOTE — DIETITIAN INITIAL EVALUATION ADULT - NS FNS DIET ORDER
Diet, NPO after Midnight:      NPO Start Date: 11-Sep-2023,   NPO Start Time: 23:59 (09-11-23 @ 07:33)  Diet, Regular (09-10-23 @ 14:54)

## 2023-09-11 NOTE — DIETITIAN INITIAL EVALUATION ADULT - WEIGHT (LBS)
MEDICATIONS  (STANDING):  amLODIPine Tablet 5 milliGRAM(s) Oral daily  ammonium lactate 12% Lotion 1 Application(s) Topical two times a day  cholecalciferol 1000 Unit(s) Oral daily  finasteride 5 milliGRAM(s) Oral daily  folic acid 1 milliGRAM(s) Oral daily  memantine 10 milliGRAM(s) Oral two times a day  mirtazapine 22.5 milliGRAM(s) Oral at bedtime  pantoprazole Tablet 40 milliGRAM(s) Oral before breakfast  risperiDONE Tablet 0.5 milliGRAM(s) Oral at bedtime  risperiDONE Tablet 0.25 milliGRAM(s) Oral <User Schedule>    MEDICATIONS  (PRN):  aluminum hydroxide/magnesium hydroxide/simethicone Suspension 30 milliLiter(s) Oral every 4 hours PRN Dyspepsia  artificial tears Solution 1 Drop(s) Both EYES four times a day PRN eye irritation  glycerin Suppository - Adult 1 Suppository(s) Rectal daily PRN constipation  risperiDONE Tablet 0.25 milliGRAM(s) Oral every 8 hours PRN Agitation  saline laxative (FLEET) Rectal Enema 1 Enema Rectal daily PRN constipation   195.1

## 2023-09-11 NOTE — DIETITIAN INITIAL EVALUATION ADULT - OTHER INFO
Patient is a 68M with PMHx of HTN, GERD and recent diagnosis of stage IV pancreatic cancer with numerous liver metastasis who presents to [facility] from home for second opinion regarding treatmnet options. Patient reports he was in good health prior when he developed painless jaundice in the end of July with darkening urine and acholic stools. States he initially presented to [facility] and was diagnosed with stage IV adenocarcinoma. Reports he underwent ERCP with 2 biliary stents placed and was discharged to follow up with Dr. Ayala (oncologist at [facility]). Reports at presentation later in August to outpatient oncologist office, patient was noted to be hypotensive and febrile c/f cholangitis requiring hospital admission for posterior segment 6 liver abscess that was drained percutaneously (8/15). Reports additional endoscopy was performed that time and patient is unsure if stents were exchanged (per ERCP report, stents are 7Fr and occupy R anterior and posterior hepatic ducts). States he was admitted for 2-3 weeks and reports upon discharged he followed up with Dr. Goldberg on Thursday for second opinion. Patient reports today that he has been experiencing progressive fatigue and weakness, as well as anorexia 2/2 poor appetite. States he has become weak to the point where he cannot walk on his own and requires a cane. States he is now presenting to [facility] at referral of a friend requesting consultation with Dr. Gorman.     Pt seen in room for nutrition assessment. RD spoke to pt's wife, Elissa, on the phone as well to obtain additional information. Pt reports poor appetite PTA and during hospital stay. As per diet recall PTA: pt endorsed he "has not been eating;" pt's wife stated for the past 3 days prior to admission, pt had not been eating any meals, just drinking gatorade and water. Pt's wife stated for the past 3-4 weeks pt has not been eating well, <50% PO consumption overall, eating "bites" here and there of omelettes, candy, etc. Minimal PO intakes per pt's wife. Currently on regular diet, tolerating well, noted with <25% PO intakes overall. No cultural, Congregation, or ethnic food preferences noted. No known food allergies. Pt's wife stated pt had lost ~20 pounds after his hip surgery in January, stated that he lost ~80 pounds unintentionally, thereafter, in the past ~7-8 months. Pt stated his usual body weight was ~300 pounds. This ~29% wt loss in the past 7-8 months is clinically significant. Dosing wt: 195 pounds. Ideal body weight: 178 pounds, pt is 110% of ideal body weight. Pt endorsed he has been nauseous lately, no noted vomiting, diarrhea, constipation, last BM on 9/10/23. Some distention noted. Noted with mild, 2+ level edema to R foot, L foot, R ankle, L ankle. Skin: bruising (ecchymosis). Rich: 13. No issues chewing or swallowing noted. Noted with moderate level of pain. Labs reviewed: elevated Tbili (12.6), ALP (649), AST (155), ALT (54), BUN (40), Cr (1.64), serum Glucose (133), low eGFR (46); RD to continue to monitor trends. Nutritionally pertinent medications/supplements: IV antibiotics. As per nutrition focused physical exam, RD noted severe triceps and moderate orbital subcutaneous fat loss, severe calf muscle wasting, moderate temporal muscle loss. Based on ASPEN guidelines, pt does meet criteria for severe malnutrition at this time. Pt amenable to brief education; RD provided education in regards to the importance of adequate macro and micronutrients, as well as hydration to support ADLs, maintain energy levels and overall functional/nutritional status. General healthful education provided. Nutrient-dense foods promoted. RD emphasized the integral role that protein plays in expediting the wound healing process. Pt was receptive and verbalized understanding. No additional nutrition-related concerns. Will continue to follow per RD protocol. Additional nutrition recommendations below to follow.

## 2023-09-11 NOTE — PROVIDER CONTACT NOTE (CRITICAL VALUE NOTIFICATION) - BACKGROUND
Pt is a 68M with PMHx of HTN, GERD, and recent diagnosis of stage IV pancreatic cancer that has metasized to the liver

## 2023-09-11 NOTE — DIETITIAN INITIAL EVALUATION ADULT - OTHER CALCULATIONS
Based on Standards of Care pt within % ideal body weight, thus actual body weight used for all calculations. Needs adjusted for advanced age, clinical status and malnutrition (repletion).

## 2023-09-11 NOTE — DIETITIAN INITIAL EVALUATION ADULT - NSFNSPHYEXAMSKINFT_GEN_A_CORE
Pressure Injury 1: none, none  Pressure Injury 2: gluteal cleft x2 spots 1cm each- referred to wound care nurse specialist, Stage II  Pressure Injury 3: none, none  Pressure Injury 4: none, none  Pressure Injury 5: none, none  Pressure Injury 6: none, none  Pressure Injury 7: none, none  Pressure Injury 8: none, none  Pressure Injury 9: none, none  Pressure Injury 10: none, none  Pressure Injury 11: none, none, Pressure Injury 1: sacrum, Stage II  Pressure Injury 2: Left:, gluteal, Stage II  Pressure Injury 3: none, none  Pressure Injury 4: none, none  Pressure Injury 5: none, none  Pressure Injury 6: none, none  Pressure Injury 7: none, none  Pressure Injury 8: none, none  Pressure Injury 9: none, none  Pressure Injury 10: none, none  Pressure Injury 11: none, none, Pressure Injury 1: none, none  Pressure Injury 2: gluteal cleft x2 spots 1cm each, Stage II  Pressure Injury 3: none, none  Pressure Injury 4: none, none  Pressure Injury 5: none, none  Pressure Injury 6: none, none  Pressure Injury 7: none, none  Pressure Injury 8: none, none  Pressure Injury 9: none, none  Pressure Injury 10: none, none  Pressure Injury 11: none, none

## 2023-09-11 NOTE — PROGRESS NOTE ADULT - ASSESSMENT
68M with PMHx of HTN, GERD and recent diagnosis of stage IV pancreatic cancer with numerous liver metastasis who presents to [facility] from home for second opinion regarding treatmnet options. Patietn afebrile and HD stable on admission, but per outpatient report, pain had positive blood cultures for Klebsiella in the past.     BCx  Regular diet, NPO @MN for possible IR procedure  No IVF  Zosyn  Lovenox/SCDs/IS/OOBA   68M with PMHx of HTN, GERD and recent diagnosis of stage IV pancreatic cancer with numerous liver metastasis who presented to [facility] from home for second opinion regarding treatment options.     BCx  Regular diet, NPO @MN for possible IR procedure  No IVF  Zosyn  Lovenox/SCDs/IS/OOBA

## 2023-09-11 NOTE — DIETITIAN INITIAL EVALUATION ADULT - NUTRITIONGOAL OUTCOME1
Pt to consistently meet at least 75% of EEE via tolerated route that is consistent with goals of care and will no longer exhibit malnutrition during hospital stay

## 2023-09-11 NOTE — DIETITIAN INITIAL EVALUATION ADULT - SIGNS/SYMPTOMS
as evidenced by moderate to severe muscle/fat loss per NFPE, ~29% wt loss in <1 year, </=75% x5amxcg

## 2023-09-11 NOTE — DIETITIAN INITIAL EVALUATION ADULT - PERTINENT LABORATORY DATA
09-11    139  |  102  |  37<H>  ----------------------------<  128<H>  3.0<L>   |  24  |  1.78<H>    Ca    8.0<L>      11 Sep 2023 06:57  Phos  3.4     09-11  Mg     2.0     09-11    TPro  6.2  /  Alb  1.7<L>  /  TBili  12.0<H>  /  DBili  9.2<H>  /  AST  178<H>  /  ALT  55<H>  /  AlkPhos  610<H>  09-11

## 2023-09-11 NOTE — DIETITIAN NUTRITION RISK NOTIFICATION - TREATMENT: THE FOLLOWING DIET HAS BEEN RECOMMENDED
Diet, NPO after Midnight:      NPO Start Date: 11-Sep-2023,   NPO Start Time: 23:59 (09-11-23 @ 07:33) [Active]  Diet, Regular (09-10-23 @ 14:54) [Active]

## 2023-09-12 NOTE — DISCHARGE NOTE PROVIDER - HOSPITAL COURSE
68 year old male with PMHx of HTN, GERD and recent diagnosis of stage IV pancreatic cancer with numerous liver metastasis who presented to [facility] from home for a second opinion regarding treatment options. Patient was afebrile and hemodynamically stable on admission, but per outpatient report, had positive blood cultures for Klebsiella in the past. Received 1 unit of packed red blood cells on 9/11 for a hemoglobin of 7.0 and blood cultures were sent. On 9/12 the patient went to IR ____      **Last updated on 9/11**   68 year old male with PMHx of HTN, GERD and recent diagnosis of stage IV pancreatic cancer with numerous liver metastasis who presented to [facility] from home for a second opinion regarding treatment options. Patient was afebrile and hemodynamically stable on admission, but per outpatient report, had positive blood cultures for Klebsiella in the past. Received 1 unit of packed red blood cells on 9/11 for a hemoglobin of 7.0 and blood cultures were sent. On 9/12 interventional radiology placed a 10F biliary drainage catheter, and the abdomen was accessed with a micropuncture kit and 8F multi-purpose drainage catheter was placed for drainage of peritoneal fluid samples of both biliary fluid and peritoneal fluid were sent for culture, and the patient was transferred to the SICU for close hemodynamic monitoring. On 9/13 the patient was stepped down to a telemetry floor, the biliary culture was positive for rare klebsiella PNA, moderate enterococcus faecalis and enterococcus faecium, spending susceptibility. On 9/14 the patient was started on Meropenem and Daptomycin per the recommendations of Infectious Disease, who's recommendations were appreciated and followed throughout the patient's hospital course. On 9/15 the patient was stepped down to a regional floor and he was started on daily salt tabs for his hyponatremia, per Internal Medicine's recommendations, and Psychiatry was consulted in regard to the patient's expression of hopelessness. On 9/16 the patient had multiple episodes of emesis, abdominal x-ray demonstrated a nonobstructive gas pattern, a nasogastric tube was placed and it's position was confirmed with chest x-ray, the patient's nasogastric tube was later removed and his nausea and emesis resolved. On 9/17 the patient was tolerating his clear liquid diet. On 9/18 _____      **Last updated on 9/18**   68 year old male with PMHx of HTN, GERD and recent diagnosis of stage IV pancreatic cancer with numerous liver metastasis who presented to [facility] from home for a second opinion regarding treatment options. Patient was afebrile and hemodynamically stable on admission, but per outpatient report, had positive blood cultures for Klebsiella in the past. Received 1 unit of packed red blood cells on 9/11 for a hemoglobin of 7.0 and blood cultures were sent. On 9/12 interventional radiology placed a 10F biliary drainage catheter, and the abdomen was accessed with a micropuncture kit and 8F multi-purpose drainage catheter was placed for drainage of peritoneal fluid samples of both biliary fluid and peritoneal fluid were sent for culture, and the patient was transferred to the SICU for close hemodynamic monitoring. On 9/13 the patient was stepped down to a telemetry floor, the biliary culture was positive for rare klebsiella PNA, moderate enterococcus faecalis and enterococcus faecium, spending susceptibility. On 9/14 the patient was started on Meropenem and Daptomycin per the recommendations of Infectious Disease, who's recommendations were appreciated and followed throughout the patient's hospital course. On 9/15 the patient was stepped down to a regional floor and he was started on daily salt tabs for his hyponatremia, per Internal Medicine's recommendations, and Psychiatry was consulted in regard to the patient's expression of hopelessness. On 9/16 the patient had multiple episodes of emesis, abdominal x-ray demonstrated a nonobstructive gas pattern, a nasogastric tube was placed and it's position was confirmed with chest x-ray, the patient's nasogastric tube was later removed and his nausea and emesis resolved. On 9/17 the patient was tolerating his clear liquid diet. On 9/18 LVX ppx started. On 9/19....      **Last updated on 9/19**

## 2023-09-12 NOTE — BRIEF OPERATIVE NOTE - OPERATION/FINDINGS
pt was under GA with ET. The intrahepatic FABIANO drain was accessed with contrast revealing no significant fluid collection. Two pt was under GA with ET. The intrahepatic FABIANO drain in the right flank was accessed with contrast revealing no significant fluid collection. Two pre-existing intrahepatic biliary stents noted. The left hepatic duct was accessed using a Anne set and a 10F biliary drainage catheter with added side holes was placed and secured with 2.0 Nylon suture. The right lower abdomen was accessed with a micropuncture kit and a 8F multi-purpose drainage catheter was placed for drainage of peritoneal fluid. A total of 2.5L of clear, yellow ascites was drained. Catheter was removed at the end of procedure. Both peritoneal and biliary fluid were sent for fluid studies.

## 2023-09-12 NOTE — PROGRESS NOTE ADULT - SUBJECTIVE AND OBJECTIVE BOX
SUBJECTIVE: Pt seen and examined at bedside with chief. Pt continues to complain of band-like abd pain. Minimal PO intake yesterday, admits of nausea but no vomiting. Admits to      MEDICATIONS  (STANDING):  carvedilol 6.25 milliGRAM(s) Oral every 12 hours  dextrose 5% + sodium chloride 0.45%. 1000 milliLiter(s) (120 mL/Hr) IV Continuous <Continuous>  furosemide   Injectable 20 milliGRAM(s) IV Push once  influenza  Vaccine (HIGH DOSE) 0.7 milliLiter(s) IntraMuscular once  melatonin 5 milliGRAM(s) Oral at bedtime  piperacillin/tazobactam IVPB.. 3.375 Gram(s) IV Intermittent every 8 hours    MEDICATIONS  (PRN):  HYDROmorphone   Tablet 2 milliGRAM(s) Oral every 3 hours PRN Moderate Pain (4 - 6)      Vital Signs Last 24 Hrs  T(C): 36.8 (12 Sep 2023 05:20), Max: 36.9 (11 Sep 2023 09:25)  T(F): 98.3 (12 Sep 2023 05:20), Max: 98.4 (11 Sep 2023 09:25)  HR: 67 (12 Sep 2023 05:20) (61 - 68)  BP: 121/59 (12 Sep 2023 05:20) (100/62 - 123/88)  BP(mean): 79 (12 Sep 2023 05:20) (75 - 79)  RR: 17 (12 Sep 2023 05:20) (16 - 18)  SpO2: 97% (12 Sep 2023 05:20) (97% - 100%)    Parameters below as of 12 Sep 2023 05:20  Patient On (Oxygen Delivery Method): room air        PHYSICAL EXAM:      Constitutional: A&Ox3    Respiratory: non labored breathing, no respiratory distress    Cardiovascular: NSR, RRR    Gastrointestinal: Soft, ND, NT. Pigtail drain with seropurulent output.     Genitourinary: Voiding     Extremities: (-) edema                  I&O's Detail    11 Sep 2023 07:01  -  12 Sep 2023 07:00  --------------------------------------------------------  IN:    dextrose 5% + sodium chloride 0.45%: 840 mL    IV PiggyBack: 175 mL    Oral Fluid: 600 mL    PRBCs (Packed Red Blood Cells): 300 mL  Total IN: 1915 mL    OUT:    Drain (mL): 0 mL    Voided (mL): 1100 mL  Total OUT: 1100 mL    Total NET: 815 mL          LABS:                        7.9    9.03  )-----------( 243      ( 12 Sep 2023 07:06 )             23.8     09-11    139  |  102  |  37<H>  ----------------------------<  128<H>  3.0<L>   |  24  |  1.78<H>    Ca    8.0<L>      11 Sep 2023 06:57  Phos  3.4     09-11  Mg     2.0     09-11    TPro  6.2  /  Alb  1.7<L>  /  TBili  12.0<H>  /  DBili  9.2<H>  /  AST  178<H>  /  ALT  55<H>  /  AlkPhos  610<H>  09-11    PT/INR - ( 10 Sep 2023 11:53 )   PT: 18.5 sec;   INR: 1.65          PTT - ( 10 Sep 2023 11:53 )  PTT:31.9 sec  Urinalysis Basic - ( 11 Sep 2023 06:57 )    Color: x / Appearance: x / SG: x / pH: x  Gluc: 128 mg/dL / Ketone: x  / Bili: x / Urobili: x   Blood: x / Protein: x / Nitrite: x   Leuk Esterase: x / RBC: x / WBC x   Sq Epi: x / Non Sq Epi: x / Bacteria: x        RADIOLOGY & ADDITIONAL STUDIES:

## 2023-09-12 NOTE — PROGRESS NOTE ADULT - ASSESSMENT
68M with PMHx of HTN, GERD and recent diagnosis of stage IV pancreatic cancer with numerous liver metastasis who presents to [facility] from home for second opinion regarding treatmnet options. Patietn afebrile and HD stable on admission, but per outpatient report, pain had positive blood cultures for Klebsiella in the past.     Ascitis drainage and possible PTC drain today  Lasix, FFP 2 units prior to procedure  f/u BCx 9/11  NPO/IVF  Zosyn  holding Lovenox for procedure/SCDs/IS/OOBA

## 2023-09-12 NOTE — CONSULT NOTE ADULT - ASSESSMENT
Patient is a 68M with PMHx of HTN, GERD and recent diagnosis of stage IV pancreatic cancer with numerous liver metastasis presented to [facility] and diagnosed with stage IV adenocarcinoma, s/p liver abscess drainage x 6 8/15, s/p ERCp with two stent placement as per records, getting paracentesis with possibly PCT drain placement   history of klebsiella bacteremia from outpatient basis       PRN FFPS as needed pre op for elevated coag levels   IR for paracentesis with possibly PTC drain placement   Blood culture x 1 - NGTD, recommend two negative blood cultures to r/o bacteremia   Zosyn to continue as appropriate, can repeat UA as it was contaminated though clinically may not be helpful as already on antibiotics   NPO / IVF diet as per michel   normocytic anemia s/p 2  U PRBCs   dvt ppx as per surgery   continue PRN PRBC transfusions to keep HB > 7   HTN- continue on coreg          Patient is a 68M with PMHx of HTN, GERD and recent diagnosis of stage IV pancreatic cancer with numerous liver metastasis presented to [facility] and diagnosed with stage IV adenocarcinoma, s/p liver abscess drainage x 6 8/15, s/p ERCp with two stent placement as per records, getting paracentesis with possibly PCT drain placement   history of klebsiella bacteremia from outpatient basis     pending upgrade to ICU ....      PRN FFPS as needed pre op for elevated coag levels   IR for paracentesis with possibly PTC drain placement   Blood culture x 1 - NGTD, recommend two negative blood cultures to r/o bacteremia   Zosyn to continue as appropriate, can repeat UA as it was contaminated though clinically may not be helpful as already on antibiotics   NPO / IVF diet as per suregery   normocytic anemia s/p 2  U PRBCs   dvt ppx as per surgery   continue PRN PRBC transfusions to keep HB > 7   HTN- continue on coreg              Patient is a 68M with PMHx of HTN, GERD and recent diagnosis of stage IV pancreatic cancer with numerous liver metastasis presented to [facility] and diagnosed with stage IV adenocarcinoma, s/p liver abscess drainage x 6 8/15, s/p ERCp with two stent placement as per records, getting paracentesis with possibly PCT drain placement, history of klebsiella bacteremia from outpatient basis , patient pending upgrade to ICU for monitoring, biliary drain placed, 2.5 L fluid drained which was clear and yellow, hypotensive during procedure, 2 FFPs given pre-op,   biliary tree stents visualized and in place      PRN FFPS as needed pre op for elevated coag levels   IR for paracentesis with possibly PTC drain placement   Blood culture x 1 - NGTD, recommend two negative blood cultures to r/o bacteremia   Zosyn to continue as appropriate, can repeat UA as it was contaminated though clinically may not be helpful as already on antibiotics   NPO / IVF diet as per michel   normocytic anemia s/p 2  U PRBCs   dvt ppx as per surgery   continue PRN PRBC transfusions to keep HB > 7   HTN- continue on coreg

## 2023-09-12 NOTE — ADVANCED PRACTICE NURSE CONSULT - ASSESSMENT
Pt reports has had wounds to gluteals off and on. Decreased mobility, unable to walk. Has chair cushion for sitting in chair at home. c/o pain to tailbone depending on positioning.  Rich 13. Continent of urine using urinal.     Gluteal cleft, moisture associated skin damage: linear partial thickness denuded area within the cleft. Surrounding blanchable redness.     Heels intact. Drain to upper back. Pt reports has had wounds to gluteals off and on. Decreased mobility, unable to walk distance. Has chair cushion for sitting in chair at home. c/o pain to tailbone depending on positioning.  Rich 13. Continent of urine using urinal.     Gluteal cleft, moisture associated skin damage: linear partial thickness denuded area within the cleft. Surrounding blanchable redness. Sacrococcygeal intact.    Heels intact. Drain to upper back.

## 2023-09-12 NOTE — CONSULT NOTE ADULT - ASSESSMENT
Patient is a 68M with PMHx of HTN, GERD and recent diagnosis of stage IV pancreatic cancer with numerous liver metastasis who presents to [facility] from home for second opinion regarding treatmnet options. Patient reports he was in good health prior when he developed painless jaundice in the end of July with darkening urine and acholic stools. States he initially presented to [facility] and was diagnosed with stage IV adenocarcinoma. Reports he underwent ERCP with 2 biliary stents placed and was discharged to follow up with Dr. Ayala (oncologist at [facility]). Reports at presentation later in August to outpatient oncologist office, patient was noted to be hypotensive and febrile c/f cholangitis requiring hospital admission for posterior segment 6 liver abscess that was drained percutaneously (8/15). Reports additional endoscopy was performed that time and patient is unsure if stents were exchanged (per ERCP report, stents are 7Fr and occupy R anterior and posterior hepatic ducts). States he was admitted for 2-3 weeks and reports upon discharged he followed up with Dr. Goldberg on Thursday for second opinion. Patient reports today that he has been experiencing progressive fatigue and weakness, as well as anorexia 2/2 poor appetite. States he has become weak to the point where he cannot walk on his own and requires a cane. States he is now presenting to [facility] at referral of a friend requesting consultation with Dr. Gorman. Now s/p PTC placement and drainage of 2.5L of clear, yellow ascites (9/12). Preoperatively, received 1 unit prbc yesterday, FFPx2, lasix x1. 500cc albumin given during case. After intubation, patient was hypotensive requiring Beka. Postoperatively, pt was extubated and VS wnl, off beka. Patient admitted to SICU for further HD monitoring.    Neuro: Aox3. Pain/nausea control prn.   CV: MAPs >65. Required beka during case, postoperatively, VS wnl off pressors. HTN, c/w Coreg 6.25mg bid.   Resp: Breathing comfortably on RA. Extubated in OR.   GI: Reg diet. GERD on PPI. Pancreatic ca w/ innumerable mets to liver. S/p ERCP with 2 biliary stents, posterior segment 6 liver abscess s/p percutaneously drainage (8/15). During this admission, s/p PTC placement and drainage of 2.5L of clear, yellow ascites (9/12).  Endo: mISS.   ID: Zosyn  PPx: SCDs, holding SQH  Drains: R. posterior IR drain. Anterior PTC drain.   Dispo: SICU

## 2023-09-12 NOTE — CONSULT NOTE ADULT - SUBJECTIVE AND OBJECTIVE BOX
HPI:  Patient is a 68M with PMHx of HTN, GERD and recent diagnosis of stage IV pancreatic cancer with numerous liver metastasis who presents to [facility] from home for second opinion regarding treatmnet options. Patient reports he was in good health prior when he developed painless jaundice in the end of July with darkening urine and acholic stools. States he initially presented to [facility] and was diagnosed with stage IV adenocarcinoma. Reports he underwent ERCP with 2 biliary stents placed and was discharged to follow up with Dr. Ayala (oncologist at [facility]). Reports at presentation later in August to outpatient oncologist office, patient was noted to be hypotensive and febrile c/f cholangitis requiring hospital admission for posterior segment 6 liver abscess that was drained percutaneously (8/15). Reports additional endoscopy was performed that time and patient is unsure if stents were exchanged (per ERCP report, stents are 7Fr and occupy R anterior and posterior hepatic ducts). States he was admitted for 2-3 weeks and reports upon discharged he followed up with Dr. Goldberg on Thursday for second opinion. Patient reports today that he has been experiencing progressive fatigue and weakness, as well as anorexia 2/2 poor appetite. States he has become weak to the point where he cannot walk on his own and requires a cane. States he is now presenting to [facility] at referral of a friend requesting consultation with Dr. Gorman.     As per verbal report from outpatient oncologist, patient has had positive blood cultures for Klebsiella.     Last colonoscopy noted to be >5 years ago, but within normal limits.  Admits to family history of colon ca and leukemia in paternal grandfather. Denies family history of pancreatic cancer.    Medical History: HTN, GERD, pancreatic ca  Surgical History: L hip surgery  Medications: Coreg 12.5 qd, Pantoprazole 40 qd  Allergies: Denies, NKDA  Social History: 50+ pack year smoking history - quit 4 years ago. Denies alcohol or additional drug use. 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    In the ED, patient jaundiced and ill-appearing:  -VITALS: Afebrile T 98.3F, HR 74, /61, RR 18 - saturating well on RA  -LABORATORY: WBC 7.5 with neutrophil predominance, Hb 7.7, T bili noted to be 12.2, INR 1.65, C 1.64, , , ALT 54  -OUTPATIENT LAB WORK : , CA [id number]  -IMAGING: CT scan with IV contrast in the ED with numerous hepatic metastasis (10 Sep 2023 14:45)      SICU ADDENDUM:  Patient is a 68M with PMHx of HTN, GERD and recent diagnosis of stage IV pancreatic cancer with numerous liver metastasis who presents to [facility] from home for second opinion regarding treatmnet options. Patient reports he was in good health prior when he developed painless jaundice in the end of July with darkening urine and acholic stools. States he initially presented to [facility] and was diagnosed with stage IV adenocarcinoma. Reports he underwent ERCP with 2 biliary stents placed and was discharged to follow up with Dr. Ayala (oncologist at [facility]). Reports at presentation later in August to outpatient oncologist office, patient was noted to be hypotensive and febrile c/f cholangitis requiring hospital admission for posterior segment 6 liver abscess that was drained percutaneously (8/15). Reports additional endoscopy was performed that time and patient is unsure if stents were exchanged (per ERCP report, stents are 7Fr and occupy R anterior and posterior hepatic ducts). States he was admitted for 2-3 weeks and reports upon discharged he followed up with Dr. Goldberg on Thursday for second opinion. Patient reports today that he has been experiencing progressive fatigue and weakness, as well as anorexia 2/2 poor appetite. States he has become weak to the point where he cannot walk on his own and requires a cane. States he is now presenting to [facility] at referral of a friend requesting consultation with Dr. Gorman. Now s/p PTC placement and drainage of 2.5L of clear, yellow ascites (). Preoperatively, received 1 unit prbc yesterday, FFPx2, lasix x1. 500cc albumin given during case. After intubation, patient was hypotensive requiring Beka. Postoperatively, pt was extubated and VS wnl, off beka. Patient admitted to SICU for further HD monitoring.     Medical History: HTN, GERD, pancreatic ca  Surgical History: L hip surgery  Medications: Coreg 12.5 qd, Pantoprazole 40 qd  Allergies: Denies, NKDA  Social History: 50+ pack year smoking history - quit 4 years ago. Denies alcohol or additional drug use. 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   NEURO: AAOx3, CN II-XII grossly intact, EOMI, follows commands  HEENT: PERRL, MMM. Scleral icterus.   CV: RRR  PULM: CTAB, nonlabored breathing, no respiratory distress.  ABD: soft, NTND. No rebound, no guarding, no tympany. R. posterior FABIANO with purulent/bilious output. PTC bilious.   EXTREM: WWP, no edema, no calf tenderness.  SKIN: jaundiced.  PSYCH: Appropriate affect, answers questions appropriately    LABS:        140  |  105  |  36<H>  ----------------------------<  147<H>  3.6   |  24  |  1.61<H>    Ca    8.1<L>      12 Sep 2023 07:06  Phos  3.1       Mg     1.9         TPro  6.2  /  Alb  1.8<L>  /  TBili  11.5<H>  /  DBili  9.2<H>  /  AST  137<H>  /  ALT  51<H>  /  AlkPhos  565<H>    LIVER FUNCTIONS - ( 12 Sep 2023 07:06 )  Alb: 1.8 g/dL / Pro: 6.2 g/dL / ALK PHOS: 565 U/L / ALT: 51 U/L / AST: 137 U/L / GGT: x                               7.9    9.03  )-----------( 243      ( 12 Sep 2023 07:06 )             23.8   PT/INR - ( 12 Sep 2023 07:06 )   PT: 17.0 sec;   INR: 1.51          PTT - ( 12 Sep 2023 07:06 )  PTT:33.3 sec  Urinalysis Basic - ( 12 Sep 2023 11:34 )    Color: Yellow / Appearance: Clear / S.010 / pH: x  Gluc: x / Ketone: NEGATIVE  / Bili: Moderate / Urobili: 0.2 E.U./dL   Blood: x / Protein: NEGATIVE mg/dL / Nitrite: NEGATIVE   Leuk Esterase: NEGATIVE / RBC: x / WBC x   Sq Epi: x / Non Sq Epi: x / Bacteria: x    CAPILLARY BLOOD GLUCOSE

## 2023-09-12 NOTE — DISCHARGE NOTE PROVIDER - NSDCCPCAREPLAN_GEN_ALL_CORE_FT
What Type Of Note Output Would You Prefer (Optional)?: Standard Output Hpi Title: Evaluation of Skin Lesions How Severe Are Your Spot(S)?: mild PRINCIPAL DISCHARGE DIAGNOSIS  Diagnosis: Primary pancreatic cancer with metastasis to other site  Assessment and Plan of Treatment:       SECONDARY DISCHARGE DIAGNOSES  Diagnosis: Jaundice  Assessment and Plan of Treatment:      Have Your Spot(S) Been Treated In The Past?: has not been treated

## 2023-09-12 NOTE — ADVANCED PRACTICE NURSE CONSULT - RECOMMEDATIONS
Gluteal cleft: cleanse with bath wipes. Apply zinc based Moisture barrier cream. Reapply daily and prn cleansing. Do NOT cover.     Waffle cushion when in the chair.  Gluteal cleft: cleanse with bath wipes. Apply zinc based Moisture barrier cream. Reapply daily and prn cleansing. Do NOT cover.     Waffle cushion when in the chair.   Continue repositioning for pressure injury prevention - educated that when he has pain to tailbone to notify RN for repositioning.     Discussed with primary RN and notified AIME Chavez.    Please reconsult if wound worsens or new wounds develop.

## 2023-09-12 NOTE — DISCHARGE NOTE PROVIDER - CARE PROVIDER_API CALL
Marco Antonio Gorman  Surgery  [address] [place] [address]  Phone: [phone]  Fax: [phone]  Follow Up Time:   
No

## 2023-09-12 NOTE — CONSULT NOTE ADULT - SUBJECTIVE AND OBJECTIVE BOX
HPI:  Patient is a 68M with PMHx of HTN, GERD and recent diagnosis of stage IV pancreatic cancer with numerous liver metastasis presented to [facility] and diagnosed with stage IV adenocarcinoma, s/p liver abscess drainage x 6 8/15, s/p ERCp with two stent placement as per records, getting paracentesis with possibly PCT drain placement   history of klebsiella bacteremia from outpatient basis      family history of colon ca and leukemia in paternal grandfather. Denies family history of pancreatic cancer.    Medical History: HTN, GERD, pancreatic ca  Surgical History: L hip surgery  Medications: Coreg 12.5 qd, Pantoprazole 40 qd  Allergies: Denies, NKDA  Social History: 50+ pack year smoking history - quit 4 years ago. Denies alcohol or additional drug use. 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 1000 milliLiter(s) (120 mL/Hr) IV Continuous <Continuous>  influenza  Vaccine (HIGH DOSE) 0.7 milliLiter(s) IntraMuscular once  melatonin 5 milliGRAM(s) Oral at bedtime  piperacillin/tazobactam IVPB.. 3.375 Gram(s) IV Intermittent every 8 hours  potassium chloride  20 mEq/100 mL IVPB 20 milliEquivalent(s) IV Intermittent every 2 hours    MEDICATIONS  (PRN):  HYDROmorphone   Tablet 2 milliGRAM(s) Oral every 3 hours PRN Moderate Pain (4 - 6)        Vital Signs Last 24 Hrs  T(C): 36.9 (12 Sep 2023 12:18), Max: 36.9 (11 Sep 2023 20:15)  T(F): 97.9 (12 Sep 2023 10:25), Max: 98.4 (11 Sep 2023 20:15)  HR: 61 (12 Sep 2023 12:18) (61 - 68)  BP: 130/78 (12 Sep 2023 12:18) (121/59 - 139/70)  BP(mean): 79 (12 Sep 2023 12:18) (79 - 79)  RR: 17 (12 Sep 2023 12:18) (17 - 17)  SpO2: 98% (12 Sep 2023 12:18) (97% - 99%)    Parameters below as of 12 Sep 2023 10:25  Patient On (Oxygen Delivery Method): room air        I&O's Summary    11 Sep 2023 07:01  -  12 Sep 2023 07:00  --------------------------------------------------------  IN: 1915 mL / OUT: 1100 mL / NET: 815 mL        LABS:                        7.9    9.03  )-----------( 243      ( 12 Sep 2023 07:06 )             23.8     09-12    140  |  105  |  36<H>  ----------------------------<  147<H>  3.6   |  24  |  1.61<H>    Ca    8.1<L>      12 Sep 2023 07:06  Phos  3.1     12  Mg     1.9     12    TPro  6.2  /  Alb  1.8<L>  /  TBili  11.5<H>  /  DBili  9.2<H>  /  AST  137<H>  /  ALT  51<H>  /  AlkPhos  565<H>  0912    LIVER FUNCTIONS - ( 12 Sep 2023 07:06 )  Alb: 1.8 g/dL / Pro: 6.2 g/dL / ALK PHOS: 565 U/L / ALT: 51 U/L / AST: 137 U/L / GGT: x           PT/INR - ( 12 Sep 2023 07:06 )   PT: 17.0 sec;   INR: 1.51          PTT - ( 12 Sep 2023 07:06 )  PTT:33.3 sec  Urinalysis Basic - ( 12 Sep 2023 11:34 )    Color: Yellow / Appearance: Clear / S.010 / pH: x  Gluc: x / Ketone: NEGATIVE  / Bili: Moderate / Urobili: 0.2 E.U./dL   Blood: x / Protein: NEGATIVE mg/dL / Nitrite: NEGATIVE   Leuk Esterase: NEGATIVE / RBC: x / WBC x   Sq Epi: x / Non Sq Epi: x / Bacteria: x      CAPILLARY BLOOD GLUCOSE          Cultures:  Culture Results:   No growth at 1 day. ( @ 10:09)      PHYSICAL EXAM:  General: NAD, resting comfortably  HEENT: NC/AT, EOMI, normal hearing, no oral lesions, no LAD, neck supple  Pulmonary: Normal resp effort, CTA-B  Cardiovascular: NSR, no murmurs  Abdominal: Soft, ND/NT, no organomegaly  Groin: Soft, nontender, no ecchymosis/hematoma, no erythema, no edema.  Extremities: (+) DP/PT pulses. FROM, normal strength, no clubbing/cyanosis/erythema/edema  Neuro: A/O x 3, CNs II-XII grossly intact, normal sensation, no focal deficits  Pulses: Palpable distal pulses    RADIOLOGY & ADDITIONAL STUDIES:      ASSESSMENT:      PLAN:         HPI:  Patient is a 68M with PMHx of HTN, GERD and recent diagnosis of stage IV pancreatic cancer with numerous liver metastasis presented to [facility] and diagnosed with stage IV adenocarcinoma, s/p liver abscess drainage x 6 8/15, s/p ERCp with two stent placement as per records, getting paracentesis with possibly PCT drain placement   history of klebsiella bacteremia from outpatient basis   patient pending upgrade to ICU ......      family history of colon ca and leukemia in paternal grandfather. Denies family history of pancreatic cancer.    Medical History: HTN, GERD, pancreatic ca  Surgical History: L hip surgery  Medications: Coreg 12.5 qd, Pantoprazole 40 qd  Allergies: Denies, NKDA  Social History: 50+ pack year smoking history - quit 4 years ago. Denies alcohol or additional drug use. 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 1000 milliLiter(s) (120 mL/Hr) IV Continuous <Continuous>  influenza  Vaccine (HIGH DOSE) 0.7 milliLiter(s) IntraMuscular once  melatonin 5 milliGRAM(s) Oral at bedtime  piperacillin/tazobactam IVPB.. 3.375 Gram(s) IV Intermittent every 8 hours  potassium chloride  20 mEq/100 mL IVPB 20 milliEquivalent(s) IV Intermittent every 2 hours    MEDICATIONS  (PRN):  HYDROmorphone   Tablet 2 milliGRAM(s) Oral every 3 hours PRN Moderate Pain (4 - 6)        Vital Signs Last 24 Hrs  T(C): 36.9 (12 Sep 2023 12:18), Max: 36.9 (11 Sep 2023 20:15)  T(F): 97.9 (12 Sep 2023 10:25), Max: 98.4 (11 Sep 2023 20:15)  HR: 61 (12 Sep 2023 12:18) (61 - 68)  BP: 130/78 (12 Sep 2023 12:18) (121/59 - 139/70)  BP(mean): 79 (12 Sep 2023 12:18) (79 - 79)  RR: 17 (12 Sep 2023 12:18) (17 - 17)  SpO2: 98% (12 Sep 2023 12:18) (97% - 99%)    Parameters below as of 12 Sep 2023 10:25  Patient On (Oxygen Delivery Method): room air        I&O's Summary    11 Sep 2023 07:01  -  12 Sep 2023 07:00  --------------------------------------------------------  IN: 1915 mL / OUT: 1100 mL / NET: 815 mL        LABS:                        7.9    9.03  )-----------( 243      ( 12 Sep 2023 07:06 )             23.8     09-12    140  |  105  |  36<H>  ----------------------------<  147<H>  3.6   |  24  |  1.61<H>    Ca    8.1<L>      12 Sep 2023 07:06  Phos  3.1     09-12  Mg     1.9     09-12    TPro  6.2  /  Alb  1.8<L>  /  TBili  11.5<H>  /  DBili  9.2<H>  /  AST  137<H>  /  ALT  51<H>  /  AlkPhos  565<H>  0912    LIVER FUNCTIONS - ( 12 Sep 2023 07:06 )  Alb: 1.8 g/dL / Pro: 6.2 g/dL / ALK PHOS: 565 U/L / ALT: 51 U/L / AST: 137 U/L / GGT: x           PT/INR - ( 12 Sep 2023 07:06 )   PT: 17.0 sec;   INR: 1.51          PTT - ( 12 Sep 2023 07:06 )  PTT:33.3 sec  Urinalysis Basic - ( 12 Sep 2023 11:34 )    Color: Yellow / Appearance: Clear / S.010 / pH: x  Gluc: x / Ketone: NEGATIVE  / Bili: Moderate / Urobili: 0.2 E.U./dL   Blood: x / Protein: NEGATIVE mg/dL / Nitrite: NEGATIVE   Leuk Esterase: NEGATIVE / RBC: x / WBC x   Sq Epi: x / Non Sq Epi: x / Bacteria: x      CAPILLARY BLOOD GLUCOSE          Cultures:  Culture Results:   No growth at 1 day. ( @ 10:09)      PHYSICAL EXAM:  ...    RADIOLOGY & ADDITIONAL STUDIES:             HPI:  Patient is a 68M with PMHx of HTN, GERD and recent diagnosis of stage IV pancreatic cancer with numerous liver metastasis presented to [facility] and diagnosed with stage IV adenocarcinoma, s/p liver abscess drainage x 6 8/15, s/p ERCp with two stent placement as per records, getting paracentesis with possibly PCT drain placement, history of klebsiella bacteremia from outpatient basis , patient pending upgrade to ICU for monitoring, biliary drain placed, 2.5 L fluid drained which was clear and yellow, hypotensive during procedure, 2 FFPs given pre-op,   biliary tree stents visualized and in place....       family history of colon ca and leukemia in paternal grandfather. Denies family history of pancreatic cancer.    Medical History: HTN, GERD, pancreatic ca  Surgical History: L hip surgery  Medications: Coreg 12.5 qd, Pantoprazole 40 qd  Allergies: Denies, NKDA  Social History: 50+ pack year smoking history - quit 4 years ago. Denies alcohol or additional drug use. 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 1000 milliLiter(s) (120 mL/Hr) IV Continuous <Continuous>  influenza  Vaccine (HIGH DOSE) 0.7 milliLiter(s) IntraMuscular once  melatonin 5 milliGRAM(s) Oral at bedtime  piperacillin/tazobactam IVPB.. 3.375 Gram(s) IV Intermittent every 8 hours  potassium chloride  20 mEq/100 mL IVPB 20 milliEquivalent(s) IV Intermittent every 2 hours    MEDICATIONS  (PRN):  HYDROmorphone   Tablet 2 milliGRAM(s) Oral every 3 hours PRN Moderate Pain (4 - 6)        Vital Signs Last 24 Hrs  T(C): 36.9 (12 Sep 2023 12:18), Max: 36.9 (11 Sep 2023 20:15)  T(F): 97.9 (12 Sep 2023 10:25), Max: 98.4 (11 Sep 2023 20:15)  HR: 61 (12 Sep 2023 12:18) (61 - 68)  BP: 130/78 (12 Sep 2023 12:18) (121/59 - 139/70)  BP(mean): 79 (12 Sep 2023 12:18) (79 - 79)  RR: 17 (12 Sep 2023 12:18) (17 - 17)  SpO2: 98% (12 Sep 2023 12:18) (97% - 99%)    Parameters below as of 12 Sep 2023 10:25  Patient On (Oxygen Delivery Method): room air        I&O's Summary    11 Sep 2023 07:01  -  12 Sep 2023 07:00  --------------------------------------------------------  IN: 1915 mL / OUT: 1100 mL / NET: 815 mL        LABS:                        7.9    9.03  )-----------( 243      ( 12 Sep 2023 07:06 )             23.8     12    140  |  105  |  36<H>  ----------------------------<  147<H>  3.6   |  24  |  1.61<H>    Ca    8.1<L>      12 Sep 2023 07:06  Phos  3.1     12  Mg     1.9     12    TPro  6.2  /  Alb  1.8<L>  /  TBili  11.5<H>  /  DBili  9.2<H>  /  AST  137<H>  /  ALT  51<H>  /  AlkPhos  565<H>  12    LIVER FUNCTIONS - ( 12 Sep 2023 07:06 )  Alb: 1.8 g/dL / Pro: 6.2 g/dL / ALK PHOS: 565 U/L / ALT: 51 U/L / AST: 137 U/L / GGT: x           PT/INR - ( 12 Sep 2023 07:06 )   PT: 17.0 sec;   INR: 1.51          PTT - ( 12 Sep 2023 07:06 )  PTT:33.3 sec  Urinalysis Basic - ( 12 Sep 2023 11:34 )    Color: Yellow / Appearance: Clear / S.010 / pH: x  Gluc: x / Ketone: NEGATIVE  / Bili: Moderate / Urobili: 0.2 E.U./dL   Blood: x / Protein: NEGATIVE mg/dL / Nitrite: NEGATIVE   Leuk Esterase: NEGATIVE / RBC: x / WBC x   Sq Epi: x / Non Sq Epi: x / Bacteria: x      CAPILLARY BLOOD GLUCOSE          Cultures:  Culture Results:   No growth at 1 day. ( @ 10:09)      PHYSICAL EXAM:  ...    RADIOLOGY & ADDITIONAL STUDIES:             HPI:  Patient is a 68M with PMHx of HTN, GERD and recent diagnosis of stage IV pancreatic cancer with numerous liver metastasis presented to [facility] and diagnosed with stage IV adenocarcinoma, s/p liver abscess drainage x 6 8/15, s/p ERCp with two stent placement as per records, getting paracentesis with possibly PCT drain placement, history of klebsiella bacteremia from outpatient basis , patient pending upgrade to ICU for monitoring, biliary drain placed, 2.5 L fluid drained which was clear and yellow, hypotensive during procedure, 2 FFPs given pre-op,   biliary tree stents visualized and in place       family history of colon ca and leukemia in paternal grandfather. Denies family history of pancreatic cancer.    Medical History: HTN, GERD, pancreatic ca  Surgical History: L hip surgery  Medications: Coreg 12.5 qd, Pantoprazole 40 qd  Allergies: Denies, NKDA  Social History: 50+ pack year smoking history - quit 4 years ago. Denies alcohol or additional drug use. 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 1000 milliLiter(s) (120 mL/Hr) IV Continuous <Continuous>  influenza  Vaccine (HIGH DOSE) 0.7 milliLiter(s) IntraMuscular once  melatonin 5 milliGRAM(s) Oral at bedtime  piperacillin/tazobactam IVPB.. 3.375 Gram(s) IV Intermittent every 8 hours  potassium chloride  20 mEq/100 mL IVPB 20 milliEquivalent(s) IV Intermittent every 2 hours    MEDICATIONS  (PRN):  HYDROmorphone   Tablet 2 milliGRAM(s) Oral every 3 hours PRN Moderate Pain (4 - 6)        Vital Signs Last 24 Hrs  T(C): 36.9 (12 Sep 2023 12:18), Max: 36.9 (11 Sep 2023 20:15)  T(F): 97.9 (12 Sep 2023 10:25), Max: 98.4 (11 Sep 2023 20:15)  HR: 61 (12 Sep 2023 12:18) (61 - 68)  BP: 130/78 (12 Sep 2023 12:18) (121/59 - 139/70)  BP(mean): 79 (12 Sep 2023 12:18) (79 - 79)  RR: 17 (12 Sep 2023 12:18) (17 - 17)  SpO2: 98% (12 Sep 2023 12:18) (97% - 99%)    Parameters below as of 12 Sep 2023 10:25  Patient On (Oxygen Delivery Method): room air    T(C): 37.1 (23 @ 10:16), Max: 37.1 (23 @ 10:16)  HR: 62 (23 @ 10:49) (54 - 84)  BP: 149/66 (23 @ 10:49) (100/50 - 152/77)  RR: 17 (23 @ 10:49) (13 - 26)  SpO2: 97% (23 @ 10:49) (97% - 100%)    CONSTITUTIONAL: Well groomed, no apparent distress  EYES: PERRLA and symmetric, EOMI, No conjunctival or scleral injection, non-icteric  ENMT: Oral mucosa with moist membranes. Normal dentition; no pharyngeal injection or exudates   NECK: Supple, symmetric and without tracheal deviation   RESP: No respiratory distress, no use of accessory muscles; CTA b/l, no WRR  CV: RRR, +S1S2, no MRG; no JVD; no peripheral edema  GI: Soft, NT, mild distension, no rebound, no guarding; + hepatosplenomegaly;  SKIN: Jaundiced   NEURO: CN II-XII intact;      I&O's Summary    11 Sep 2023 07:01  -  12 Sep 2023 07:00  --------------------------------------------------------  IN: 1915 mL / OUT: 1100 mL / NET: 815 mL        LABS:                        7.9    9.03  )-----------( 243      ( 12 Sep 2023 07:06 )             23.8     09-12    140  |  105  |  36<H>  ----------------------------<  147<H>  3.6   |  24  |  1.61<H>    Ca    8.1<L>      12 Sep 2023 07:06  Phos  3.1       Mg     1.9         TPro  6.2  /  Alb  1.8<L>  /  TBili  11.5<H>  /  DBili  9.2<H>  /  AST  137<H>  /  ALT  51<H>  /  AlkPhos  565<H>      LIVER FUNCTIONS - ( 12 Sep 2023 07:06 )  Alb: 1.8 g/dL / Pro: 6.2 g/dL / ALK PHOS: 565 U/L / ALT: 51 U/L / AST: 137 U/L / GGT: x           PT/INR - ( 12 Sep 2023 07:06 )   PT: 17.0 sec;   INR: 1.51          PTT - ( 12 Sep 2023 07:06 )  PTT:33.3 sec  Urinalysis Basic - ( 12 Sep 2023 11:34 )    Color: Yellow / Appearance: Clear / S.010 / pH: x  Gluc: x / Ketone: NEGATIVE  / Bili: Moderate / Urobili: 0.2 E.U./dL   Blood: x / Protein: NEGATIVE mg/dL / Nitrite: NEGATIVE   Leuk Esterase: NEGATIVE / RBC: x / WBC x   Sq Epi: x / Non Sq Epi: x / Bacteria: x      CAPILLARY BLOOD GLUCOSE          Cultures:  Culture Results:   No growth at 1 day. ( @ 10:09)      PHYSICAL EXAM:  ...    RADIOLOGY & ADDITIONAL STUDIES:

## 2023-09-13 NOTE — PROGRESS NOTE ADULT - SUBJECTIVE AND OBJECTIVE BOX
S: Pt seen and evaluated on am rounds. Pt doing well with no complaints overnight. Pt on RA and tolerating. Pt denies any pain.    O: ICU Vital Signs Last 24 Hrs  T(F): 97.6 (09-13-23 @ 02:00), Max: 98.4 (09-12-23 @ 08:40)  HR: 62 (09-13-23 @ 07:00) (54 - 73)  BP: 140/65 (09-13-23 @ 07:00) (100/50 - 152/77)  BP(mean): 93 (09-13-23 @ 07:00) (71 - 110)  ABP: --  RR: 20 (09-13-23 @ 07:00) (13 - 26)  SpO2: 98% (09-13-23 @ 07:00) (98% - 100%)    PHYSICAL EXAM:   Neurological: AAOx3, CNII-XII intact,  strength 5/5 b/l  ENT: mucus membrane moist  Cardiovascular: RRR  Respiratory: NAD, on RA  Gastrointestinal: soft, NT, ND, PTC with bilious output, FABIANO with scant serosanguinous output  Extremities: warm, no dependent edema  Vascular: no cyanosis/erythema  Skin: jaundiced  MSK: no joint swelling.     LABS:    09-13    137  |  102  |  27<H>  ----------------------------<  166<H>  3.6   |  25  |  1.38<H>    Ca    8.4      13 Sep 2023 05:30  Phos  2.7     09-13  Mg     1.7     09-13    TPro  6.5  /  Alb  2.2<L>  /  TBili  14.0<H>  /  DBili  x   /  AST  105<H>  /  ALT  44  /  AlkPhos  536<H>  09-13  LIVER FUNCTIONS - ( 13 Sep 2023 05:30 )  Alb: 2.2 g/dL / Pro: 6.5 g/dL / ALK PHOS: 536 U/L / ALT: 44 U/L / AST: 105 U/L / GGT: x                               10.6   8.97  )-----------( 241      ( 13 Sep 2023 05:30 )             32.0   PT/INR - ( 12 Sep 2023 07:06 )   PT: 17.0 sec;   INR: 1.51          PTT - ( 12 Sep 2023 07:06 )  PTT:33.3 sec  Urinalysis Basic - ( 13 Sep 2023 05:30 )    Color: x / Appearance: x / SG: x / pH: x  Gluc: 166 mg/dL / Ketone: x  / Bili: x / Urobili: x   Blood: x / Protein: x / Nitrite: x   Leuk Esterase: x / RBC: x / WBC x   Sq Epi: x / Non Sq Epi: x / Bacteria: x    CAPILLARY BLOOD GLUCOSE      POCT Blood Glucose.: 158 mg/dL (13 Sep 2023 06:45)  POCT Blood Glucose.: 151 mg/dL (12 Sep 2023 22:03)  POCT Blood Glucose.: 156 mg/dL (12 Sep 2023 19:40)    MEDICATIONS  (STANDING):  carvedilol 6.25 milliGRAM(s) Oral every 12 hours  chlorhexidine 2% Cloths 1 Application(s) Topical <User Schedule>  dextrose 5% + sodium chloride 0.45%. 1000 milliLiter(s) (120 mL/Hr) IV Continuous <Continuous>  dextrose 5%. 1000 milliLiter(s) (50 mL/Hr) IV Continuous <Continuous>  dextrose 5%. 1000 milliLiter(s) (100 mL/Hr) IV Continuous <Continuous>  dextrose 50% Injectable 25 Gram(s) IV Push once  dextrose 50% Injectable 12.5 Gram(s) IV Push once  dextrose 50% Injectable 25 Gram(s) IV Push once  glucagon  Injectable 1 milliGRAM(s) IntraMuscular once  influenza  Vaccine (HIGH DOSE) 0.7 milliLiter(s) IntraMuscular once  insulin lispro (ADMELOG) corrective regimen sliding scale   SubCutaneous at bedtime  insulin lispro (ADMELOG) corrective regimen sliding scale   SubCutaneous three times a day before meals  melatonin 5 milliGRAM(s) Oral at bedtime  piperacillin/tazobactam IVPB.. 3.375 Gram(s) IV Intermittent every 8 hours    MEDICATIONS  (PRN):  dextrose Oral Gel 15 Gram(s) Oral once PRN Blood Glucose LESS THAN 70 milliGRAM(s)/deciliter  HYDROmorphone   Tablet 2 milliGRAM(s) Oral every 3 hours PRN Moderate Pain (4 - 6)      Glass:	  [ ] None	[ ] Daily Glass Order Placed	   Indication:	  [ ] Strict I and O's    [ ] Obstruction     [ ] Incontinence + Stage 3 or 4 Decubitus  Central Line:  [ ] None	   [ ]  Medication / TPN Administration     [ ] No Peripheral IV

## 2023-09-13 NOTE — PROGRESS NOTE ADULT - SUBJECTIVE AND OBJECTIVE BOX
O/N Events:    Subjective/ROS: Patient seen and examined at bedside   mildly disoriented, though feeling well, alert currently   skin is jaundiced, PCT drain in place     Denies Fever/Chills, HA, CP, SOB, n/v, changes in bowel/urinary habits.  12pt ROS otherwise negative.    VITALS  Vital Signs Last 24 Hrs  T(C): 37.1 (13 Sep 2023 10:16), Max: 37.1 (13 Sep 2023 10:16)  T(F): 98.7 (13 Sep 2023 10:16), Max: 98.7 (13 Sep 2023 10:16)  HR: 62 (13 Sep 2023 10:49) (54 - 84)  BP: 149/66 (13 Sep 2023 10:49) (100/50 - 152/77)  BP(mean): 95 (13 Sep 2023 10:49) (71 - 110)  RR: 17 (13 Sep 2023 10:49) (13 - 26)  SpO2: 97% (13 Sep 2023 10:49) (97% - 100%)    Parameters below as of 13 Sep 2023 10:49  Patient On (Oxygen Delivery Method): room air        CAPILLARY BLOOD GLUCOSE      POCT Blood Glucose.: 123 mg/dL (13 Sep 2023 11:51)  POCT Blood Glucose.: 158 mg/dL (13 Sep 2023 06:45)  POCT Blood Glucose.: 151 mg/dL (12 Sep 2023 22:03)  POCT Blood Glucose.: 156 mg/dL (12 Sep 2023 19:40)      PHYSICAL EXAM  General: NAD, skin is jaundiced   Head: NC/AT; MMM; PERRL; EOMI;  Neck: Supple; no JVD  Respiratory: CTAB; no wheezes/rales/rhonchi  Cardiovascular: Regular rhythm/rate; S1/S2+, no murmurs, rubs gallops   Gastrointestinal: Soft;NT. mild distension, PCT drain in place draining biliary fluid bowel sounds normal and present  Extremities: WWP; no edema/cyanosis  Neurological: A&Ox3, CNII-XII grossly intact; no obvious focal deficits    MEDICATIONS  (STANDING):  carvedilol 6.25 milliGRAM(s) Oral every 12 hours  chlorhexidine 2% Cloths 1 Application(s) Topical <User Schedule>  dextrose 5% + sodium chloride 0.45%. 1000 milliLiter(s) (120 mL/Hr) IV Continuous <Continuous>  dextrose 5%. 1000 milliLiter(s) (50 mL/Hr) IV Continuous <Continuous>  dextrose 5%. 1000 milliLiter(s) (100 mL/Hr) IV Continuous <Continuous>  dextrose 50% Injectable 12.5 Gram(s) IV Push once  dextrose 50% Injectable 25 Gram(s) IV Push once  dextrose 50% Injectable 25 Gram(s) IV Push once  glucagon  Injectable 1 milliGRAM(s) IntraMuscular once  influenza  Vaccine (HIGH DOSE) 0.7 milliLiter(s) IntraMuscular once  insulin lispro (ADMELOG) corrective regimen sliding scale   SubCutaneous at bedtime  insulin lispro (ADMELOG) corrective regimen sliding scale   SubCutaneous three times a day before meals  melatonin 5 milliGRAM(s) Oral at bedtime  piperacillin/tazobactam IVPB.. 3.375 Gram(s) IV Intermittent every 8 hours    MEDICATIONS  (PRN):  dextrose Oral Gel 15 Gram(s) Oral once PRN Blood Glucose LESS THAN 70 milliGRAM(s)/deciliter  HYDROmorphone   Tablet 2 milliGRAM(s) Oral every 3 hours PRN Moderate Pain (4 - 6)      No Known Allergies      LABS                        10.6   8.97  )-----------( 241      ( 13 Sep 2023 05:30 )             32.0     09-13    137  |  102  |  27<H>  ----------------------------<  166<H>  3.6   |  25  |  1.38<H>    Ca    8.4      13 Sep 2023 05:30  Phos  2.7     09-13  Mg     1.7     09-13    TPro  6.5  /  Alb  2.2<L>  /  TBili  14.0<H>  /  DBili  x   /  AST  105<H>  /  ALT  44  /  AlkPhos  536<H>  09-13    PT/INR - ( 13 Sep 2023 06:17 )   PT: 15.6 sec;   INR: 1.38          PTT - ( 13 Sep 2023 06:17 )  PTT:32.2 sec  Urinalysis Basic - ( 13 Sep 2023 05:30 )    Color: x / Appearance: x / SG: x / pH: x  Gluc: 166 mg/dL / Ketone: x  / Bili: x / Urobili: x   Blood: x / Protein: x / Nitrite: x   Leuk Esterase: x / RBC: x / WBC x   Sq Epi: x / Non Sq Epi: x / Bacteria: x            Culture - Body Fluid with Gram Stain (collected 09-12-23 @ 16:25)  Source: Peritoneal Peritoneal Fluid RLQ Abdomen  Gram Stain (09-12-23 @ 18:26):    No organisms seen    Rare WBC's  Preliminary Report (09-13-23 @ 09:05):    No growth to date    Culture - Body Fluid with Gram Stain (collected 09-12-23 @ 16:23)  Source: Bile biliary fluid  Gram Stain (09-12-23 @ 18:09):    No organisms seen    No WBC's seen.  Preliminary Report (09-13-23 @ 09:53):    Culture in progress        IMAGING/EKG/ETC   O/N Events:    Subjective/ROS: Patient seen and examined at bedside   much more alert today   skin is jaundiced, PCT drain in place, IR drain in place       12pt ROS otherwise negative.        PHYSICAL EXAM  General: NAD, skin is jaundiced   Head: NC/AT; MMM; PERRL; EOMI;  Neck: Supple; no JVD  Respiratory: CTAB; no wheezes/rales/rhonchi  Cardiovascular: Regular rhythm/rate; S1/S2+, no murmurs, rubs gallops   Gastrointestinal: Soft;NT. mild distension, PCT drain in place draining biliary fluid bowel sounds normal and present  Extremities: WWP; no edema/cyanosis  Neurological: A&Ox3, CNII-XII grossly intact; no obvious focal deficits    Vital Signs Last 24 Hrs  T(C): 36.8 (14 Sep 2023 09:27), Max: 36.9 (14 Sep 2023 05:44)  T(F): 98.2 (14 Sep 2023 09:27), Max: 98.5 (14 Sep 2023 05:44)  HR: 60 (14 Sep 2023 08:55) (60 - 72)  BP: 169/72 (14 Sep 2023 08:55) (117/69 - 169/72)  BP(mean): 104 (14 Sep 2023 08:55) (85 - 104)  RR: 17 (14 Sep 2023 08:55) (17 - 17)  SpO2: 98% (14 Sep 2023 08:55) (96% - 99%)    Parameters below as of 14 Sep 2023 08:55  Patient On (Oxygen Delivery Method): room air        MEDICATIONS  (STANDING):  carvedilol 6.25 milliGRAM(s) Oral every 12 hours  chlorhexidine 2% Cloths 1 Application(s) Topical <User Schedule>  dextrose 5% + sodium chloride 0.45%. 1000 milliLiter(s) (120 mL/Hr) IV Continuous <Continuous>  dextrose 5%. 1000 milliLiter(s) (50 mL/Hr) IV Continuous <Continuous>  dextrose 5%. 1000 milliLiter(s) (100 mL/Hr) IV Continuous <Continuous>  dextrose 50% Injectable 12.5 Gram(s) IV Push once  dextrose 50% Injectable 25 Gram(s) IV Push once  dextrose 50% Injectable 25 Gram(s) IV Push once  enoxaparin Injectable 40 milliGRAM(s) SubCutaneous every 24 hours  glucagon  Injectable 1 milliGRAM(s) IntraMuscular once  influenza  Vaccine (HIGH DOSE) 0.7 milliLiter(s) IntraMuscular once  insulin lispro (ADMELOG) corrective regimen sliding scale   SubCutaneous at bedtime  insulin lispro (ADMELOG) corrective regimen sliding scale   SubCutaneous three times a day before meals  magnesium sulfate  IVPB 2 Gram(s) IV Intermittent once  melatonin 5 milliGRAM(s) Oral at bedtime  piperacillin/tazobactam IVPB.. 3.375 Gram(s) IV Intermittent every 8 hours  potassium chloride   Powder 40 milliEquivalent(s) Oral once  potassium chloride  10 mEq/100 mL IVPB 10 milliEquivalent(s) IV Intermittent every 1 hour  potassium phosphate IVPB 15 milliMole(s) IV Intermittent once    MEDICATIONS  (PRN):  dextrose Oral Gel 15 Gram(s) Oral once PRN Blood Glucose LESS THAN 70 milliGRAM(s)/deciliter  HYDROmorphone   Tablet 2 milliGRAM(s) Oral every 3 hours PRN Moderate Pain (4 - 6)  ondansetron Injectable 4 milliGRAM(s) IV Push every 8 hours PRN Nausea and/or Vomiting          No Known Allergies      LABS                        10.6   8.97  )-----------( 241      ( 13 Sep 2023 05:30 )             32.0     09-13    137  |  102  |  27<H>  ----------------------------<  166<H>  3.6   |  25  |  1.38<H>    Ca    8.4      13 Sep 2023 05:30  Phos  2.7     09-13  Mg     1.7     09-13    TPro  6.5  /  Alb  2.2<L>  /  TBili  14.0<H>  /  DBili  x   /  AST  105<H>  /  ALT  44  /  AlkPhos  536<H>  09-13    PT/INR - ( 13 Sep 2023 06:17 )   PT: 15.6 sec;   INR: 1.38          PTT - ( 13 Sep 2023 06:17 )  PTT:32.2 sec  Urinalysis Basic - ( 13 Sep 2023 05:30 )    Color: x / Appearance: x / SG: x / pH: x  Gluc: 166 mg/dL / Ketone: x  / Bili: x / Urobili: x   Blood: x / Protein: x / Nitrite: x   Leuk Esterase: x / RBC: x / WBC x   Sq Epi: x / Non Sq Epi: x / Bacteria: x            Culture - Body Fluid with Gram Stain (collected 09-12-23 @ 16:25)  Source: Peritoneal Peritoneal Fluid RLQ Abdomen  Gram Stain (09-12-23 @ 18:26):    No organisms seen    Rare WBC's  Preliminary Report (09-13-23 @ 09:05):    No growth to date    Culture - Body Fluid with Gram Stain (collected 09-12-23 @ 16:23)  Source: Bile biliary fluid  Gram Stain (09-12-23 @ 18:09):    No organisms seen    No WBC's seen.  Preliminary Report (09-13-23 @ 09:53):    Culture in progress        IMAGING/EKG/ETC

## 2023-09-13 NOTE — PROGRESS NOTE ADULT - ASSESSMENT
Patient is a 68M with PMHx of HTN, GERD and recent diagnosis of stage IV pancreatic cancer with numerous liver metastasis who presents to [facility] from home for second opinion regarding treatmnet options. Patient reports he was in good health prior when he developed painless jaundice in the end of July with darkening urine and acholic stools. States he initially presented to [facility] and was diagnosed with stage IV adenocarcinoma. Reports he underwent ERCP with 2 biliary stents placed and was discharged to follow up with Dr. Ayala (oncologist at [facility]). Reports at presentation later in August to outpatient oncologist office, patient was noted to be hypotensive and febrile c/f cholangitis requiring hospital admission for posterior segment 6 liver abscess that was drained percutaneously (8/15). Reports additional endoscopy was performed that time and patient is unsure if stents were exchanged (per ERCP report, stents are 7Fr and occupy R anterior and posterior hepatic ducts). States he was admitted for 2-3 weeks and reports upon discharged he followed up with Dr. Goldberg on Thursday for second opinion. Patient reports today that he has been experiencing progressive fatigue and weakness, as well as anorexia 2/2 poor appetite. States he has become weak to the point where he cannot walk on his own and requires a cane. States he is now presenting to [facility] at referral of a friend requesting consultation with Dr. Gorman. Now s/p PTC placement and drainage of 2.5L of clear, yellow ascites (9/12). Preoperatively, received 1 unit prbc yesterday, FFPx2, lasix x1. 500cc albumin given during case. After intubation, patient was hypotensive requiring Beka. Postoperatively, pt was extubated and VS wnl, off beka. Patient admitted to SICU for further HD monitoring.    Neuro: Aox3. Pain/nausea control prn.   CV: MAPs >65. Required beka during case, postoperatively, VS wnl off pressors. HTN, c/w Coreg 6.25mg bid.   Resp: Breathing comfortably on RA. Extubated in OR.   GI: Reg diet. GERD on PPI. Pancreatic ca w/ innumerable mets to liver. S/p ERCP with 2 biliary stents, posterior segment 6 liver abscess s/p percutaneously drainage (8/15). During this admission, s/p PTC placement and drainage of 2.5L of clear, yellow ascites (9/12).  Endo: mISS.   ID: Zosyn  PPx: SCDs, holding SQH  Drains: R. posterior IR drain. Anterior PTC drain.   Dispo: SICU

## 2023-09-13 NOTE — PROGRESS NOTE ADULT - ASSESSMENT
Patient is a 68M with PMHx of HTN, GERD and recent diagnosis of stage IV pancreatic cancer with numerous liver metastasis presented to [facility] and diagnosed with stage IV adenocarcinoma, s/p liver abscess drainage x 6 8/15, s/p ERCp with two stent placement as per records, getting paracentesis with possibly PCT drain placement, history of klebsiella bacteremia from outpatient basis , patient pending upgrade to ICU for monitoring, biliary drain placed, 2.5 L fluid drained which was clear and yellow, hypotensive during procedure, 2 FFPs given pre-op,   biliary tree stents visualized and in place      PRN FFPS as needed pre op for elevated coag levels   IR s/p paracentesis with PTC drain placement, bilirubin continues to be elevated  acute kidney injury on CKD is improving   Blood culture x 1 - NGTD, recommend two negative blood cultures to r/o bacteremia   Zosyn to continue as appropriate, can repeat UA as it was contaminated though clinically may not be helpful as already on antibiotics    IVF diet as per surgery   normocytic anemia s/p PRBCs transfusions, HB today improved at 10   hemodynamics are stable after PRBC / fluid resuscitation - monitor for orthostasis   dvt ppx as per surgery   HTN- continue on coreg              Patient is a 68M with PMHx of HTN, GERD and recent diagnosis of stage IV pancreatic cancer with numerous liver metastasis presented to [facility] and diagnosed with stage IV adenocarcinoma, s/p liver abscess drainage x 6 8/15, s/p ERCp with two stent placement as per records, getting paracentesis with possibly PCT drain placement, history of klebsiella bacteremia from outpatient basis , patient pending upgrade to ICU for monitoring, biliary drain placed, 2.5 L fluid drained which was clear and yellow, hypotensive during procedure, 2 FFPs given pre-op,   biliary tree stents visualized and in place      PRN FFPS as needed pre op for elevated coag levels   IR s/p paracentesis with PTC drain placement, bilirubin continues to be elevated  acute kidney injury on CKD is improving   peritoneal cultures / blood culture - NGTD   Bile culture - growing preliminarily VRE, klebsiella, citrobacter  will have ID evaluate patient and review culture results, discussed with Dr Sherif Funes to continue    IVF diet as per surgery   electrolytes replete - potassium PO    normocytic anemia s/p PRBCs transfusions, HB today improved at 10   hemodynamics are stable after PRBC / fluid resuscitation - monitor for orthostasis   dvt ppx as per surgery   HTN- continue on coreg

## 2023-09-14 NOTE — CONSULT NOTE ADULT - SUBJECTIVE AND OBJECTIVE BOX
HPI: HPI:  Patient is a 68M with PMHx of HTN, GERD and recent diagnosis of stage IV pancreatic cancer with numerous liver metastasis who presents to [facility] from home for second opinion regarding treatmnet options. Patient reports he was in good health prior when he developed painless jaundice in the end of July with darkening urine and acholic stools. States he initially presented to [facility] and was diagnosed with stage IV adenocarcinoma. Reports he underwent ERCP with 2 biliary stents placed and was discharged to follow up with Dr. Ayala (oncologist at [facility]). Reports at presentation later in August to outpatient oncologist office, patient was noted to be hypotensive and febrile c/f cholangitis requiring hospital admission for posterior segment 6 liver abscess that was drained percutaneously (8/15). Reports additional endoscopy was performed that time and patient is unsure if stents were exchanged (per ERCP report, stents are 7Fr and occupy R anterior and posterior hepatic ducts). States he was admitted for 2-3 weeks and reports upon discharged he followed up with Dr. Goldberg on Thursday for second opinion. Patient reports today that he has been experiencing progressive fatigue and weakness, as well as anorexia 2/2 poor appetite. States he has become weak to the point where he cannot walk on his own and requires a cane. States he is now presenting to [facility] at referral of a friend requesting consultation with Dr. Gorman.     As per verbal report from outpatient oncologist, patient has had positive blood cultures for Klebsiella.     Last colonoscopy noted to be >5 years ago, but within normal limits.  Admits to family history of colon ca and leukemia in paternal grandfather. Denies family history of pancreatic cancer.    Medical History: HTN, GERD, pancreatic ca  Surgical History: L hip surgery  Medications: Coreg 12.5 qd, Pantoprazole 40 qd  Allergies: Denies, NKDA  Social History: 50+ pack year smoking history - quit 4 years ago. Denies alcohol or additional drug use. 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    In the ED, patient jaundiced and ill-appearing:  -VITALS: Afebrile T 98.3F, HR 74, /61, RR 18 - saturating well on RA  -LABORATORY: WBC 7.5 with neutrophil predominance, Hb 7.7, T bili noted to be 12.2, INR 1.65, C 1.64, , , ALT 54  -OUTPATIENT LAB WORK 9/5: , CA 19-9 61795  -IMAGING: CT scan with IV contrast in the ED with numerous hepatic metastasis (10 Sep 2023 14:45)        PAST MEDICAL & SURGICAL HISTORY:  Pancreatic cancer metastasized to liver      S/P hip replacement, left          Allergies    No Known Allergies    Intolerances        MEDICATIONS  (STANDING):  carvedilol 6.25 milliGRAM(s) Oral every 12 hours  chlorhexidine 2% Cloths 1 Application(s) Topical <User Schedule>  dextrose 5% + sodium chloride 0.45%. 1000 milliLiter(s) (120 mL/Hr) IV Continuous <Continuous>  dextrose 5%. 1000 milliLiter(s) (50 mL/Hr) IV Continuous <Continuous>  dextrose 5%. 1000 milliLiter(s) (100 mL/Hr) IV Continuous <Continuous>  dextrose 50% Injectable 25 Gram(s) IV Push once  dextrose 50% Injectable 25 Gram(s) IV Push once  dextrose 50% Injectable 12.5 Gram(s) IV Push once  enoxaparin Injectable 40 milliGRAM(s) SubCutaneous every 24 hours  glucagon  Injectable 1 milliGRAM(s) IntraMuscular once  influenza  Vaccine (HIGH DOSE) 0.7 milliLiter(s) IntraMuscular once  insulin lispro (ADMELOG) corrective regimen sliding scale   SubCutaneous at bedtime  insulin lispro (ADMELOG) corrective regimen sliding scale   SubCutaneous three times a day before meals  melatonin 5 milliGRAM(s) Oral at bedtime  piperacillin/tazobactam IVPB.. 3.375 Gram(s) IV Intermittent every 8 hours  potassium chloride  10 mEq/100 mL IVPB 10 milliEquivalent(s) IV Intermittent every 1 hour  potassium phosphate IVPB 15 milliMole(s) IV Intermittent once    MEDICATIONS  (PRN):  dextrose Oral Gel 15 Gram(s) Oral once PRN Blood Glucose LESS THAN 70 milliGRAM(s)/deciliter  HYDROmorphone   Tablet 2 milliGRAM(s) Oral every 3 hours PRN Moderate Pain (4 - 6)  ondansetron Injectable 4 milliGRAM(s) IV Push every 8 hours PRN Nausea and/or Vomiting      SOCIAL HISTORY:    FAMILY HISTORY:        Vital Signs Last 24 Hrs  T(C): 36.8 (14 Sep 2023 09:27), Max: 36.9 (14 Sep 2023 05:44)  T(F): 98.2 (14 Sep 2023 09:27), Max: 98.5 (14 Sep 2023 05:44)  HR: 68 (14 Sep 2023 11:15) (60 - 72)  BP: 120/66 (14 Sep 2023 11:15) (117/69 - 169/72)  BP(mean): 86 (14 Sep 2023 11:15) (85 - 104)  RR: 17 (14 Sep 2023 11:15) (17 - 17)  SpO2: 98% (14 Sep 2023 11:15) (97% - 99%)    Parameters below as of 14 Sep 2023 11:15  Patient On (Oxygen Delivery Method): room air        I&O's Summary    13 Sep 2023 07:01  -  14 Sep 2023 07:00  --------------------------------------------------------  IN: 2640 mL / OUT: 1642 mL / NET: 998 mL    14 Sep 2023 07:01  -  14 Sep 2023 12:16  --------------------------------------------------------  IN: 360 mL / OUT: 25 mL / NET: 335 mL    PHYSICAL EXAM:  General: NAD, resting comfortably  HEENT: NC/AT, EOMI, normal hearing, no oral lesions, no LAD, neck supple  Pulmonary: Normal resp effort, CTA-B  Cardiovascular: NSR, no murmurs  Abdominal: Soft, ND/NT, no organomegaly  Groin: Soft, nontender, no ecchymosis/hematoma, no erythema, no edema.  Extremities: (+) DP/PT pulses. FROM, normal strength, no clubbing/cyanosis/erythema/edema  Neuro: A/O x 3, CNs II-XII grossly intact, normal sensation, no focal deficits  Pulses: Palpable distal pulses        LABS:                        9.4    11.54 )-----------( 183      ( 14 Sep 2023 05:30 )             28.7     09-14    135  |  102  |  21  ----------------------------<  157<H>  3.3<L>   |  21<L>  |  1.21    Ca    8.0<L>      14 Sep 2023 07:31  Phos  2.5     09-14  Mg     1.6     09-14    TPro  6.2  /  Alb  1.9<L>  /  TBili  13.2<H>  /  DBili  9.9<H>  /  AST  78<H>  /  ALT  39  /  AlkPhos  484<H>  09-14    LIVER FUNCTIONS - ( 14 Sep 2023 07:31 )  Alb: 1.9 g/dL / Pro: 6.2 g/dL / ALK PHOS: 484 U/L / ALT: 39 U/L / AST: 78 U/L / GGT: x           PT/INR - ( 13 Sep 2023 06:17 )   PT: 15.6 sec;   INR: 1.38          PTT - ( 13 Sep 2023 06:17 )  PTT:32.2 sec  Urinalysis Basic - ( 14 Sep 2023 07:31 )    Color: x / Appearance: x / SG: x / pH: x  Gluc: 157 mg/dL / Ketone: x  / Bili: x / Urobili: x   Blood: x / Protein: x / Nitrite: x   Leuk Esterase: x / RBC: x / WBC x   Sq Epi: x / Non Sq Epi: x / Bacteria: x      CAPILLARY BLOOD GLUCOSE      POCT Blood Glucose.: 146 mg/dL (14 Sep 2023 07:04)  POCT Blood Glucose.: 156 mg/dL (13 Sep 2023 22:15)  POCT Blood Glucose.: 159 mg/dL (13 Sep 2023 17:23)      Cultures:  Culture Results:   No growth to date (09-12 @ 16:25)  Culture Results:   Few Citrobacter freundii complex  Rare Klebsiella pneumoniae  Moderate Enterococcus faecalis  Moderate Enterococcus faecium  Susceptibility to follow.  Culture in progress (09-12 @ 16:23)      RADIOLOGY & ADDITIONAL STUDIES:                             HPI: HPI:  Patient is a 68M status post recent diagnosis of stage IV pancreatic cancer status post ERCP with 2 biliary stents placed and was discharged to follow up with Dr. Ayala (oncologist at [facility]). Reports at presentation later in August to outpatient oncologist office, patient was noted to be hypotensive and febrile c/f cholangitis requiring hospital admission for posterior segment 6 liver abscess that was drained percutaneously (8/15). Reports additional endoscopy was performed that time and patient is unsure if stents were exchanged (per ERCP report, stents are 7Fr and occupy R anterior and posterior hepatic ducts).       Last colonoscopy noted to be >5 years ago, but within normal limits.  Admits to family history of colon ca and leukemia in paternal grandfather. Denies family history of pancreatic cancer.    Medical History: HTN, GERD, pancreatic ca  Surgical History: L hip surgery  Medications: Coreg 12.5 qd, Pantoprazole 40 qd  Allergies: Denies, NKDA  Social History: 50+ pack year smoking history - quit 4 years ago. Denies alcohol or additional drug use. 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    In the ED, patient jaundiced and ill-appearing:  -VITALS: Afebrile T 98.3F, HR 74, /61, RR 18 - saturating well on RA  -LABORATORY: WBC 7.5 with neutrophil predominance, Hb 7.7, T bili noted to be 12.2, INR 1.65, C 1.64, , , ALT 54  -OUTPATIENT LAB WORK 9/5: , [place] 19-9 [id number]  -IMAGING: CT scan with IV contrast in the ED with numerous hepatic metastasis (10 Sep 2023 14:45)        PAST MEDICAL & SURGICAL HISTORY:  Pancreatic cancer metastasized to liver      S/P hip replacement, left          Allergies    No Known Allergies    Intolerances        MEDICATIONS  (STANDING):  carvedilol 6.25 milliGRAM(s) Oral every 12 hours  chlorhexidine 2% Cloths 1 Application(s) Topical <User Schedule>  dextrose 5% + sodium chloride 0.45%. 1000 milliLiter(s) (120 mL/Hr) IV Continuous <Continuous>  dextrose 5%. 1000 milliLiter(s) (50 mL/Hr) IV Continuous <Continuous>  dextrose 5%. 1000 milliLiter(s) (100 mL/Hr) IV Continuous <Continuous>  dextrose 50% Injectable 25 Gram(s) IV Push once  dextrose 50% Injectable 25 Gram(s) IV Push once  dextrose 50% Injectable 12.5 Gram(s) IV Push once  enoxaparin Injectable 40 milliGRAM(s) SubCutaneous every 24 hours  glucagon  Injectable 1 milliGRAM(s) IntraMuscular once  influenza  Vaccine (HIGH DOSE) 0.7 milliLiter(s) IntraMuscular once  insulin lispro (ADMELOG) corrective regimen sliding scale   SubCutaneous at bedtime  insulin lispro (ADMELOG) corrective regimen sliding scale   SubCutaneous three times a day before meals  melatonin 5 milliGRAM(s) Oral at bedtime  piperacillin/tazobactam IVPB.. 3.375 Gram(s) IV Intermittent every 8 hours  potassium chloride  10 mEq/100 mL IVPB 10 milliEquivalent(s) IV Intermittent every 1 hour  potassium phosphate IVPB 15 milliMole(s) IV Intermittent once    MEDICATIONS  (PRN):  dextrose Oral Gel 15 Gram(s) Oral once PRN Blood Glucose LESS THAN 70 milliGRAM(s)/deciliter  HYDROmorphone   Tablet 2 milliGRAM(s) Oral every 3 hours PRN Moderate Pain (4 - 6)  ondansetron Injectable 4 milliGRAM(s) IV Push every 8 hours PRN Nausea and/or Vomiting      SOCIAL HISTORY:    FAMILY HISTORY:        Vital Signs Last 24 Hrs  T(C): 36.8 (14 Sep 2023 09:27), Max: 36.9 (14 Sep 2023 05:44)  T(F): 98.2 (14 Sep 2023 09:27), Max: 98.5 (14 Sep 2023 05:44)  HR: 68 (14 Sep 2023 11:15) (60 - 72)  BP: 120/66 (14 Sep 2023 11:15) (117/69 - 169/72)  BP(mean): 86 (14 Sep 2023 11:15) (85 - 104)  RR: 17 (14 Sep 2023 11:15) (17 - 17)  SpO2: 98% (14 Sep 2023 11:15) (97% - 99%)    Parameters below as of 14 Sep 2023 11:15  Patient On (Oxygen Delivery Method): room air        I&O's Summary    13 Sep 2023 07:01  -  14 Sep 2023 07:00  --------------------------------------------------------  IN: 2640 mL / OUT: 1642 mL / NET: 998 mL    14 Sep 2023 07:01  -  14 Sep 2023 12:16  --------------------------------------------------------  IN: 360 mL / OUT: 25 mL / NET: 335 mL    PHYSICAL EXAM:  General: NAD, resting comfortably  HEENT: NC/AT, EOMI, normal hearing, no oral lesions, no LAD, neck supple  Pulmonary: Normal resp effort, CTA-B  Cardiovascular: NSR, no murmurs  Abdominal: Soft, ND/NT, no organomegaly  Groin: Soft, nontender, no ecchymosis/hematoma, no erythema, no edema.  Extremities: (+) DP/PT pulses. FROM, normal strength, no clubbing/cyanosis/erythema/edema  Neuro: A/O x 3, CNs II-XII grossly intact, normal sensation, no focal deficits  Pulses: Palpable distal pulses        LABS:                        9.4    11.54 )-----------( 183      ( 14 Sep 2023 05:30 )             28.7     09-14    135  |  102  |  21  ----------------------------<  157<H>  3.3<L>   |  21<L>  |  1.21    Ca    8.0<L>      14 Sep 2023 07:31  Phos  2.5     09-14  Mg     1.6     09-14    TPro  6.2  /  Alb  1.9<L>  /  TBili  13.2<H>  /  DBili  9.9<H>  /  AST  78<H>  /  ALT  39  /  AlkPhos  484<H>  09-14    LIVER FUNCTIONS - ( 14 Sep 2023 07:31 )  Alb: 1.9 g/dL / Pro: 6.2 g/dL / ALK PHOS: 484 U/L / ALT: 39 U/L / AST: 78 U/L / GGT: x           PT/INR - ( 13 Sep 2023 06:17 )   PT: 15.6 sec;   INR: 1.38          PTT - ( 13 Sep 2023 06:17 )  PTT:32.2 sec  Urinalysis Basic - ( 14 Sep 2023 07:31 )    Color: x / Appearance: x / SG: x / pH: x  Gluc: 157 mg/dL / Ketone: x  / Bili: x / Urobili: x   Blood: x / Protein: x / Nitrite: x   Leuk Esterase: x / RBC: x / WBC x   Sq Epi: x / Non Sq Epi: x / Bacteria: x      CAPILLARY BLOOD GLUCOSE      POCT Blood Glucose.: 146 mg/dL (14 Sep 2023 07:04)  POCT Blood Glucose.: 156 mg/dL (13 Sep 2023 22:15)  POCT Blood Glucose.: 159 mg/dL (13 Sep 2023 17:23)      Cultures:  Culture Results:   No growth to date (09-12 @ 16:25)  Culture Results:   Few Citrobacter freundii complex  Rare Klebsiella pneumoniae  Moderate Enterococcus faecalis  Moderate Enterococcus faecium  Susceptibility to follow.  Culture in progress (09-12 @ 16:23)      RADIOLOGY & ADDITIONAL STUDIES:                             HPI: HPI:  Patient is a 68M status post recent diagnosis of stage IV pancreatic cancer status post ERCP with 2 biliary stents placed and was discharged to follow up with Dr. Ayala (oncologist at [facility]). Reports at presentation later in August to outpatient oncologist office, patient was noted to be hypotensive and febrile c/f cholangitis requiring hospital admission for posterior segment 6 liver abscess that was drained percutaneously (8/15). Reports additional endoscopy was performed that time and patient is unsure if stents were exchanged (per ERCP report, stents are 7Fr and occupy R anterior and posterior hepatic ducts).       Last colonoscopy noted to be >5 years ago, but within normal limits.  Admits to family history of colon ca and leukemia in paternal grandfather. Denies family history of pancreatic cancer.    Medical History: HTN, GERD, pancreatic ca  Surgical History: L hip surgery  Medications: Coreg 12.5 qd, Pantoprazole 40 qd  Allergies: Denies, NKDA  Social History: 50+ pack year smoking history - quit 4 years ago. Denies alcohol or additional drug use. 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    In the ED, patient jaundiced and ill-appearing:  -VITALS: Afebrile T 98.3F, HR 74, /61, RR 18 - saturating well on RA  -LABORATORY: WBC 7.5 with neutrophil predominance, Hb 7.7, T bili noted to be 12.2, INR 1.65, C 1.64, , , ALT 54  -OUTPATIENT LAB WORK 9/5: , [place] 19-9 13643  -IMAGING: CT scan with IV contrast in the ED with numerous hepatic metastasis (10 Sep 2023 14:45)        PAST MEDICAL & SURGICAL HISTORY:  Pancreatic cancer metastasized to liver      S/P hip replacement, left          Allergies    No Known Allergies    Intolerances        MEDICATIONS  (STANDING):  carvedilol 6.25 milliGRAM(s) Oral every 12 hours  chlorhexidine 2% Cloths 1 Application(s) Topical <User Schedule>  dextrose 5% + sodium chloride 0.45%. 1000 milliLiter(s) (120 mL/Hr) IV Continuous <Continuous>  dextrose 5%. 1000 milliLiter(s) (50 mL/Hr) IV Continuous <Continuous>  dextrose 5%. 1000 milliLiter(s) (100 mL/Hr) IV Continuous <Continuous>  dextrose 50% Injectable 25 Gram(s) IV Push once  dextrose 50% Injectable 25 Gram(s) IV Push once  dextrose 50% Injectable 12.5 Gram(s) IV Push once  enoxaparin Injectable 40 milliGRAM(s) SubCutaneous every 24 hours  glucagon  Injectable 1 milliGRAM(s) IntraMuscular once  influenza  Vaccine (HIGH DOSE) 0.7 milliLiter(s) IntraMuscular once  insulin lispro (ADMELOG) corrective regimen sliding scale   SubCutaneous at bedtime  insulin lispro (ADMELOG) corrective regimen sliding scale   SubCutaneous three times a day before meals  melatonin 5 milliGRAM(s) Oral at bedtime  piperacillin/tazobactam IVPB.. 3.375 Gram(s) IV Intermittent every 8 hours  potassium chloride  10 mEq/100 mL IVPB 10 milliEquivalent(s) IV Intermittent every 1 hour  potassium phosphate IVPB 15 milliMole(s) IV Intermittent once    MEDICATIONS  (PRN):  dextrose Oral Gel 15 Gram(s) Oral once PRN Blood Glucose LESS THAN 70 milliGRAM(s)/deciliter  HYDROmorphone   Tablet 2 milliGRAM(s) Oral every 3 hours PRN Moderate Pain (4 - 6)  ondansetron Injectable 4 milliGRAM(s) IV Push every 8 hours PRN Nausea and/or Vomiting        Vital Signs Last 24 Hrs  T(C): 36.8 (14 Sep 2023 09:27), Max: 36.9 (14 Sep 2023 05:44)  T(F): 98.2 (14 Sep 2023 09:27), Max: 98.5 (14 Sep 2023 05:44)  HR: 68 (14 Sep 2023 11:15) (60 - 72)  BP: 120/66 (14 Sep 2023 11:15) (117/69 - 169/72)  BP(mean): 86 (14 Sep 2023 11:15) (85 - 104)  RR: 17 (14 Sep 2023 11:15) (17 - 17)  SpO2: 98% (14 Sep 2023 11:15) (97% - 99%)    Parameters below as of 14 Sep 2023 11:15  Patient On (Oxygen Delivery Method): room air        I&O's Summary    13 Sep 2023 07:01  -  14 Sep 2023 07:00  --------------------------------------------------------  IN: 2640 mL / OUT: 1642 mL / NET: 998 mL    14 Sep 2023 07:01  -  14 Sep 2023 12:16  --------------------------------------------------------  IN: 360 mL / OUT: 25 mL / NET: 335 mL    PHYSICAL EXAM:  General: NAD, resting comfortably  HEENT: NC/AT, EOMI, normal hearing, no oral lesions, no LAD, neck supple  Pulmonary: Normal resp effort, CTA-B  Cardiovascular: NSR, no murmurs  Abdominal: Soft, ND/NT, no organomegaly  Groin: Soft, nontender, no ecchymosis/hematoma, no erythema, no edema.  Extremities: (+) DP/PT pulses. FROM, normal strength, no clubbing/cyanosis/erythema/edema  Neuro: A/O x 3, CNs II-XII grossly intact, normal sensation, no focal deficits  Pulses: Palpable distal pulses        LABS:                        9.4    11.54 )-----------( 183      ( 14 Sep 2023 05:30 )             28.7     09-14    135  |  102  |  21  ----------------------------<  157<H>  3.3<L>   |  21<L>  |  1.21    Ca    8.0<L>      14 Sep 2023 07:31  Phos  2.5     09-14  Mg     1.6     09-14    TPro  6.2  /  Alb  1.9<L>  /  TBili  13.2<H>  /  DBili  9.9<H>  /  AST  78<H>  /  ALT  39  /  AlkPhos  484<H>  09-14    LIVER FUNCTIONS - ( 14 Sep 2023 07:31 )  Alb: 1.9 g/dL / Pro: 6.2 g/dL / ALK PHOS: 484 U/L / ALT: 39 U/L / AST: 78 U/L / GGT: x           PT/INR - ( 13 Sep 2023 06:17 )   PT: 15.6 sec;   INR: 1.38          PTT - ( 13 Sep 2023 06:17 )  PTT:32.2 sec  Urinalysis Basic - ( 14 Sep 2023 07:31 )    Color: x / Appearance: x / SG: x / pH: x  Gluc: 157 mg/dL / Ketone: x  / Bili: x / Urobili: x   Blood: x / Protein: x / Nitrite: x   Leuk Esterase: x / RBC: x / WBC x   Sq Epi: x / Non Sq Epi: x / Bacteria: x      CAPILLARY BLOOD GLUCOSE      POCT Blood Glucose.: 146 mg/dL (14 Sep 2023 07:04)  POCT Blood Glucose.: 156 mg/dL (13 Sep 2023 22:15)  POCT Blood Glucose.: 159 mg/dL (13 Sep 2023 17:23)      Cultures:  Culture Results:   No growth to date (09-12 @ 16:25)  Culture Results:   Few Citrobacter freundii complex  Rare Klebsiella pneumoniae  Moderate Enterococcus faecalis  Moderate Enterococcus faecium  Susceptibility to follow.  Culture in progress (09-12 @ 16:23)      RADIOLOGY & ADDITIONAL STUDIES:

## 2023-09-14 NOTE — PHYSICAL THERAPY INITIAL EVALUATION ADULT - GENERAL OBSERVATIONS, REHAB EVAL
PT IE completed. Patient received semi supine in bed +tele, +IV, +biliary drain, +FABIANO drain, +(B) SCDs, NAD, willing to work with PT.

## 2023-09-14 NOTE — CONSULT NOTE ADULT - ASSESSMENT
Impression     History of pancreatic adenocarcinoma complicated by extensive bilobar hepatic   metastatic disease status post biliary stenting x 2 presents with MDR hepatic   subcapsular abscess including ESBL Klebsiella pneumoniae as well as Enterococcus   faecium (VRE)     Plan    1. Discontinue further piperacillin tazobactam 3.375 g IV four times daily   2. Initiate meropenem 1 g IV three times daily   3. Initiate daptomycin at 10 mg/kg IV daily - check baseline Cpk  4. Continue to follow admission blood cultures as well as abscess culture   5. To discuss ultimate route and duration of antibiotics with surgery     Thank you kindly for this referral.  I will follow daily.    Alex Umanzor MD  [phone]   Impression     History of pancreatic adenocarcinoma complicated by extensive bilobar hepatic   metastatic disease status post biliary stenting x 2 presents with MDR hepatic   subcapsular abscess status post PTC drain - cultures positive for ESBL Klebsiella  Klebsiella pneumoniae as well as Enterococcus faecium (VRE)     Plan    1. Discontinue further piperacillin tazobactam 3.375 g IV four times daily   2. Initiate meropenem 1 g IV three times daily   3. Initiate daptomycin at 10 mg/kg IV daily - check baseline Cpk  4. Continue to follow admission blood cultures as well as abscess culture   5. To discuss ultimate route and duration of antibiotics with surgery     Thank you kindly for this referral.  I will follow daily.    Alex Umanzor MD  [phone]

## 2023-09-14 NOTE — PHYSICAL THERAPY INITIAL EVALUATION ADULT - SHORT TERM MEMORY, REHAB EVAL
intact Valtrex Counseling: I discussed with the patient the risks of valacyclovir including but not limited to kidney damage, nausea, vomiting and severe allergy.  The patient understands that if the infection seems to be worsening or is not improving, they are to call.

## 2023-09-14 NOTE — PHYSICAL THERAPY INITIAL EVALUATION ADULT - ADDITIONAL COMMENTS
Patient lives with wife who is always at home. Has 4 steps to enter with 1 rail and 12 steps in the home with 1 rail. Patient was ambulating with a RW PTA and was able to go up/down stairs with 1 rail. Patient reporting he was getting weaker over last few weeks making mobility more difficult. Patient also has a hoispital bed, commode and raised toilet seat at home.

## 2023-09-14 NOTE — CONSULT NOTE ADULT - TIME BILLING
Total time spent by myself with the patient or family encounter or floor/unit time in the hospital was 80 minutes > 50% of the total time   was spent counselling and/or coordinating care for this patient including reviewing diagnostic studies, reviewing active orders, counseling   patient/family on above findings and plan of care, answering the patient/family's questions, and coordinating care with SW, CM, and consult   teams.

## 2023-09-14 NOTE — PROGRESS NOTE ADULT - SUBJECTIVE AND OBJECTIVE BOX
SUBJECTIVE:  Patient was evaluated at bedside with chief resident present. Patient complains of continuous abdominal pain similar to presenting admission pain. Patient also complains of nausea with dry heaving but denies emesis. Concern of sundowning last evening however patient is AxO x4 this AM.     MEDICATIONS  (STANDING):  carvedilol 6.25 milliGRAM(s) Oral every 12 hours  chlorhexidine 2% Cloths 1 Application(s) Topical <User Schedule>  dextrose 5% + sodium chloride 0.45%. 1000 milliLiter(s) (120 mL/Hr) IV Continuous <Continuous>  dextrose 5%. 1000 milliLiter(s) (50 mL/Hr) IV Continuous <Continuous>  dextrose 5%. 1000 milliLiter(s) (100 mL/Hr) IV Continuous <Continuous>  dextrose 50% Injectable 12.5 Gram(s) IV Push once  dextrose 50% Injectable 25 Gram(s) IV Push once  dextrose 50% Injectable 25 Gram(s) IV Push once  glucagon  Injectable 1 milliGRAM(s) IntraMuscular once  influenza  Vaccine (HIGH DOSE) 0.7 milliLiter(s) IntraMuscular once  insulin lispro (ADMELOG) corrective regimen sliding scale   SubCutaneous at bedtime  insulin lispro (ADMELOG) corrective regimen sliding scale   SubCutaneous three times a day before meals  melatonin 5 milliGRAM(s) Oral at bedtime  piperacillin/tazobactam IVPB.. 3.375 Gram(s) IV Intermittent every 8 hours    MEDICATIONS  (PRN):  dextrose Oral Gel 15 Gram(s) Oral once PRN Blood Glucose LESS THAN 70 milliGRAM(s)/deciliter  HYDROmorphone   Tablet 2 milliGRAM(s) Oral every 3 hours PRN Moderate Pain (4 - 6)  ondansetron Injectable 4 milliGRAM(s) IV Push every 8 hours PRN Nausea and/or Vomiting      Vital Signs Last 24 Hrs  T(C): 36.9 (14 Sep 2023 05:44), Max: 37.1 (13 Sep 2023 10:16)  T(F): 98.5 (14 Sep 2023 05:44), Max: 98.7 (13 Sep 2023 10:16)  HR: 60 (14 Sep 2023 06:15) (57 - 84)  BP: 144/66 (14 Sep 2023 06:15) (117/69 - 158/72)  BP(mean): 95 (14 Sep 2023 06:15) (84 - 104)  RR: 17 (14 Sep 2023 06:15) (17 - 25)  SpO2: 98% (14 Sep 2023 06:15) (96% - 100%)    Parameters below as of 14 Sep 2023 06:15  Patient On (Oxygen Delivery Method): room air        Physical Exam:  General: NAD, resting comfortably in bed  Pulmonary: Nonlabored breathing, no respiratory distress  Cardiovascular: NSR  Abdominal: soft, NT/ND; R FABIANO with brownish output; LUQ biliary drain draining bilious fluid  Extremities: WWP, normal strength  Neuro: A/O x 3, CNs II-XII grossly intact, no focal deficits, normal motor/sensation  Pulses: palpable distal pulses    I&O's Summary    13 Sep 2023 07:01  -  14 Sep 2023 07:00  --------------------------------------------------------  IN: 2640 mL / OUT: 1642 mL / NET: 998 mL        LABS:                        9.4    11.54 )-----------( 183      ( 14 Sep 2023 05:30 )             28.7     09-14    130<L>  |  99  |  20  ----------------------------<  688<HH>  3.5   |  21<L>  |  1.15    Ca    7.0<L>      14 Sep 2023 05:30  Phos  2.2     09-14  Mg     1.4     09-14    TPro  6.5  /  Alb  2.2<L>  /  TBili  14.0<H>  /  DBili  x   /  AST  105<H>  /  ALT  44  /  AlkPhos  536<H>  09-13    PT/INR - ( 13 Sep 2023 06:17 )   PT: 15.6 sec;   INR: 1.38          PTT - ( 13 Sep 2023 06:17 )  PTT:32.2 sec  Urinalysis Basic - ( 14 Sep 2023 05:30 )    Color: x / Appearance: x / SG: x / pH: x  Gluc: 688 mg/dL / Ketone: x  / Bili: x / Urobili: x   Blood: x / Protein: x / Nitrite: x   Leuk Esterase: x / RBC: x / WBC x   Sq Epi: x / Non Sq Epi: x / Bacteria: x      CAPILLARY BLOOD GLUCOSE      POCT Blood Glucose.: 146 mg/dL (14 Sep 2023 07:04)  POCT Blood Glucose.: 156 mg/dL (13 Sep 2023 22:15)  POCT Blood Glucose.: 159 mg/dL (13 Sep 2023 17:23)  POCT Blood Glucose.: 123 mg/dL (13 Sep 2023 11:51)    LIVER FUNCTIONS - ( 13 Sep 2023 05:30 )  Alb: 2.2 g/dL / Pro: 6.5 g/dL / ALK PHOS: 536 U/L / ALT: 44 U/L / AST: 105 U/L / GGT: x             RADIOLOGY & ADDITIONAL STUDIES:       SUBJECTIVE:  Patient was evaluated at bedside with chief resident present. Patient complains of continuous abdominal pain similar to presenting admission pain. Patient also complains of nausea with dry heaving but denies emesis. Concern of agitation last evening, however patient is AxO x4 this AM.     MEDICATIONS  (STANDING):  carvedilol 6.25 milliGRAM(s) Oral every 12 hours  chlorhexidine 2% Cloths 1 Application(s) Topical <User Schedule>  dextrose 5% + sodium chloride 0.45%. 1000 milliLiter(s) (120 mL/Hr) IV Continuous <Continuous>  dextrose 5%. 1000 milliLiter(s) (50 mL/Hr) IV Continuous <Continuous>  dextrose 5%. 1000 milliLiter(s) (100 mL/Hr) IV Continuous <Continuous>  dextrose 50% Injectable 12.5 Gram(s) IV Push once  dextrose 50% Injectable 25 Gram(s) IV Push once  dextrose 50% Injectable 25 Gram(s) IV Push once  glucagon  Injectable 1 milliGRAM(s) IntraMuscular once  influenza  Vaccine (HIGH DOSE) 0.7 milliLiter(s) IntraMuscular once  insulin lispro (ADMELOG) corrective regimen sliding scale   SubCutaneous at bedtime  insulin lispro (ADMELOG) corrective regimen sliding scale   SubCutaneous three times a day before meals  melatonin 5 milliGRAM(s) Oral at bedtime  piperacillin/tazobactam IVPB.. 3.375 Gram(s) IV Intermittent every 8 hours    MEDICATIONS  (PRN):  dextrose Oral Gel 15 Gram(s) Oral once PRN Blood Glucose LESS THAN 70 milliGRAM(s)/deciliter  HYDROmorphone   Tablet 2 milliGRAM(s) Oral every 3 hours PRN Moderate Pain (4 - 6)  ondansetron Injectable 4 milliGRAM(s) IV Push every 8 hours PRN Nausea and/or Vomiting      Vital Signs Last 24 Hrs  T(C): 36.9 (14 Sep 2023 05:44), Max: 37.1 (13 Sep 2023 10:16)  T(F): 98.5 (14 Sep 2023 05:44), Max: 98.7 (13 Sep 2023 10:16)  HR: 60 (14 Sep 2023 06:15) (57 - 84)  BP: 144/66 (14 Sep 2023 06:15) (117/69 - 158/72)  BP(mean): 95 (14 Sep 2023 06:15) (84 - 104)  RR: 17 (14 Sep 2023 06:15) (17 - 25)  SpO2: 98% (14 Sep 2023 06:15) (96% - 100%)    Parameters below as of 14 Sep 2023 06:15  Patient On (Oxygen Delivery Method): room air        Physical Exam:  General: NAD, resting comfortably in bed, +Jaundice  Pulmonary: Nonlabored breathing, no respiratory distress  Cardiovascular: NSR  Abdominal: soft, NT/ND; R FABIANO with brownish output; LUQ biliary drain draining bilious fluid  Extremities: WWP, normal strength  Neuro: A/O x 3, CNs II-XII grossly intact, no focal deficits, normal motor/sensation  Pulses: palpable distal pulses    I&O's Summary    13 Sep 2023 07:01  -  14 Sep 2023 07:00  --------------------------------------------------------  IN: 2640 mL / OUT: 1642 mL / NET: 998 mL        LABS:                        9.4    11.54 )-----------( 183      ( 14 Sep 2023 05:30 )             28.7     09-14    130<L>  |  99  |  20  ----------------------------<  688<HH>  3.5   |  21<L>  |  1.15    Ca    7.0<L>      14 Sep 2023 05:30  Phos  2.2     09-14  Mg     1.4     09-14    TPro  6.5  /  Alb  2.2<L>  /  TBili  14.0<H>  /  DBili  x   /  AST  105<H>  /  ALT  44  /  AlkPhos  536<H>  09-13    PT/INR - ( 13 Sep 2023 06:17 )   PT: 15.6 sec;   INR: 1.38          PTT - ( 13 Sep 2023 06:17 )  PTT:32.2 sec  Urinalysis Basic - ( 14 Sep 2023 05:30 )    Color: x / Appearance: x / SG: x / pH: x  Gluc: 688 mg/dL / Ketone: x  / Bili: x / Urobili: x   Blood: x / Protein: x / Nitrite: x   Leuk Esterase: x / RBC: x / WBC x   Sq Epi: x / Non Sq Epi: x / Bacteria: x      CAPILLARY BLOOD GLUCOSE      POCT Blood Glucose.: 146 mg/dL (14 Sep 2023 07:04)  POCT Blood Glucose.: 156 mg/dL (13 Sep 2023 22:15)  POCT Blood Glucose.: 159 mg/dL (13 Sep 2023 17:23)  POCT Blood Glucose.: 123 mg/dL (13 Sep 2023 11:51)    LIVER FUNCTIONS - ( 13 Sep 2023 05:30 )  Alb: 2.2 g/dL / Pro: 6.5 g/dL / ALK PHOS: 536 U/L / ALT: 44 U/L / AST: 105 U/L / GGT: x             RADIOLOGY & ADDITIONAL STUDIES:

## 2023-09-14 NOTE — PROGRESS NOTE ADULT - ASSESSMENT
68M with PMHx of HTN, GERD and recent diagnosis of stage IV pancreatic cancer with numerous liver metastasis who presents to [facility] from home for second opinion regarding treatmnet options. Patient reports he was in good health prior when he developed painless jaundice in the end of July with darkening urine and acholic stools. States he initially presented to [facility] and was diagnosed with stage IV adenocarcinoma. Reports he underwent ERCP with 2 biliary stents placed and was discharged to follow up with Dr. Ayala (oncologist at [facility]). Reports at presentation later in August to outpatient oncologist office, patient was noted to be hypotensive and febrile c/f cholangitis requiring hospital admission for posterior segment 6 liver abscess that was drained percutaneously (8/15). Reports additional endoscopy was performed that time and patient is unsure if stents were exchanged (per ERCP report, stents are 7Fr and occupy R anterior and posterior hepatic ducts). States he was admitted for 2-3 weeks and reports upon discharged he followed up with Dr. Goldberg on Thursday for second opinion. Patient reports today that he has been experiencing progressive fatigue and weakness, as well as anorexia 2/2 poor appetite. States he has become weak to the point where he cannot walk on his own and requires a cane. States he is now presenting to [facility] at referral of a friend requesting consultation with Dr. Gorman. Now s/p PTC placement and drainage of 2.5L of clear, yellow ascites (9/12). Preoperatively, received 1 unit prbc yesterday, FFPx2, lasix x1. 500cc albumin given during case. After intubation, patient was hypotensive requiring Beka. Postoperatively, pt was extubated and VS wnl, off beka. Patient admitted to SICU for further HD monitoring. SDU to tele (9/13).    Regular low fat diet  Pain/nausea control PRN  Zosyn  Coreg 6.25mg bid  PPI  Restart SQL  SCDs/IS/OOB  R posterior IR drain (hepatic abscess)  Anterior PTC drain  Follow up Repeat AM labs, previous AM labs drawn from IV line

## 2023-09-14 NOTE — PHYSICAL THERAPY INITIAL EVALUATION ADULT - PERTINENT HX OF CURRENT PROBLEM, REHAB EVAL
68M with PMHx of HTN, GERD and recent diagnosis of stage IV pancreatic cancer with numerous liver metastasis who presents to [facility] from home for second opinion regarding treatmnet options. Patient reports he was in good health prior when he developed painless jaundice in the end of July with darkening urine and acholic stools. States he initially presented to [facility] and was diagnosed with stage IV adenocarcinoma. Reports he underwent ERCP with 2 biliary stents placed and was discharged to follow up with Dr. Ayala (oncologist at [facility]). Reports at presentation later in August to outpatient oncologist office, patient was noted to be hypotensive and febrile c/f cholangitis requiring hospital admission for posterior segment 6 liver abscess that was drained percutaneously (8/15). Reports additional endoscopy was performed that time and patient is unsure if stents were exchanged (per ERCP report, stents are 7Fr and occupy R anterior and posterior hepatic ducts). States he was admitted for 2-3 weeks and reports upon discharged he followed up with Dr. Goldberg on Thursday for second opinion. Patient reports today that he has been experiencing progressive fatigue and weakness, as well as anorexia 2/2 poor appetite. States he has become weak to the point where he cannot walk on his own and requires a cane. States he is now presenting to [facility] at referral of a friend requesting consultation with Dr. Gorman. Now s/p PTC placement and drainage of 2.5L of clear, yellow ascites (9/12). Preoperatively, received 1 unit prbc yesterday, FFPx2, lasix x1. 500cc albumin given during case. After intubation, patient was hypotensive requiring Beka. Postoperatively, pt was extubated and VS wnl, off beka. Patient admitted to SICU for further HD monitoring. SDU to tele (9/13).

## 2023-09-14 NOTE — PHYSICAL THERAPY INITIAL EVALUATION ADULT - BED MOBILITY LIMITATIONS, REHAB EVAL
Tolerated sitting EOB for ~5 minutes. Patient reported "not feeling well" with several emesis attempts while sitting EOB. Continued to endorse abdominal discomfort and requesting to defer further OOB mobility at this time./decreased ability to use legs for bridging/pushing/impaired ability to control trunk for mobility

## 2023-09-15 NOTE — CHART NOTE - NSCHARTNOTEFT_GEN_A_CORE
Admitting Diagnosis:   Patient is a 68y old  Male who presents with a chief complaint of metastatic pancreatic ca (15 Sep 2023 12:24)  PAST MEDICAL & SURGICAL HISTORY:  Pancreatic cancer metastasized to liver  S/P hip replacement, left    Current Nutrition Order: low fat diet     PO Intake: Good (%) [   ]  Fair (50-75%) [   ] Poor (<25%) [ x ]    GI Issues: noted with some intermittent nausea per pt; no emesis noted; most recent BM On 9/11/23, pt is noted to have some distention, however soft/nontender abdomen;     Pain: no noted pain/discomfort per chart/nursing information    Skin Integrity: FABIANO drain to R back; no pressure ulcers noted; noted with bilateral foot edema 2+, mild, pitting; Rich: 18     Labs:   09-15    130<L>  |  100  |  20  ----------------------------<  139<H>  3.5   |  21<L>  |  1.16    Ca    8.0<L>      15 Sep 2023 06:19  Phos  3.0     09-15  Mg     1.8     09-15    TPro  6.1  /  Alb  1.8<L>  /  TBili  12.8<H>  /  DBili  9.5<H>  /  AST  74<H>  /  ALT  33  /  AlkPhos  414<H>  09-15    CAPILLARY BLOOD GLUCOSE    POCT Blood Glucose.: 142 mg/dL (15 Sep 2023 12:27)  POCT Blood Glucose.: 138 mg/dL (15 Sep 2023 06:24)  POCT Blood Glucose.: 109 mg/dL (14 Sep 2023 21:26)  POCT Blood Glucose.: 152 mg/dL (14 Sep 2023 17:08)    Medications:  MEDICATIONS  (STANDING):  albumin human 25% IVPB 50 milliLiter(s) IV Intermittent once  carvedilol 6.25 milliGRAM(s) Oral every 12 hours  chlorhexidine 2% Cloths 1 Application(s) Topical <User Schedule>  DAPTOmycin IVPB 800 milliGRAM(s) IV Intermittent every 24 hours  dextrose 5% + sodium chloride 0.9%. 1000 milliLiter(s) (120 mL/Hr) IV Continuous <Continuous>  dextrose 5%. 1000 milliLiter(s) (50 mL/Hr) IV Continuous <Continuous>  dextrose 5%. 1000 milliLiter(s) (100 mL/Hr) IV Continuous <Continuous>  dextrose 50% Injectable 25 Gram(s) IV Push once  dextrose 50% Injectable 12.5 Gram(s) IV Push once  dextrose 50% Injectable 25 Gram(s) IV Push once  enoxaparin Injectable 40 milliGRAM(s) SubCutaneous every 24 hours  glucagon  Injectable 1 milliGRAM(s) IntraMuscular once  influenza  Vaccine (HIGH DOSE) 0.7 milliLiter(s) IntraMuscular once  insulin lispro (ADMELOG) corrective regimen sliding scale   SubCutaneous three times a day before meals  insulin lispro (ADMELOG) corrective regimen sliding scale   SubCutaneous at bedtime  melatonin 5 milliGRAM(s) Oral at bedtime  meropenem  IVPB 1000 milliGRAM(s) IV Intermittent every 8 hours  potassium chloride    Tablet ER 40 milliEquivalent(s) Oral once  sodium chloride 1 Gram(s) Oral two times a day  urea Oral Powder 15 Gram(s) Oral daily    MEDICATIONS  (PRN):  dextrose Oral Gel 15 Gram(s) Oral once PRN Blood Glucose LESS THAN 70 milliGRAM(s)/deciliter  HYDROmorphone   Tablet 2 milliGRAM(s) Oral every 3 hours PRN Moderate Pain (4 - 6)  ondansetron Injectable 4 milliGRAM(s) IV Push every 8 hours PRN Nausea and/or Vomiting    Dosing Anthropometrics:   Height for BMI (FEET)	6 Feet  Height for BMI (INCHES)	0 Inch(s)  Height for BMI (CM)	182.88 Centimeter(s)  Weight for BMI (lbs)	195.1 lb  Weight for BMI (kg)	88.5 kg  Body Mass Index	              26.4  Ideal body weight: 178 pounds, pt is 110% of ideal body weight, current body weight to be utilized.     Weight Change: no new weights noted in electronic medical records: strongly recommend obtaining updated weight related to pt's ascites drainage planned for today (9/15/23); RD to continue to trend.     Nutrition Focused Physical Exam: Completed [ x on initial assessment on 9/11/23 ]  Not Pertinent [   ]  Pt's signs of muscle and fat loss remain. RD to monitor.     Estimated energy needs:   Weight used for calculations	current weight  Estimated Energy Needs Weight (lbs)	195.1 lb  Estimated Energy Needs Weight (kg)	88.5 kg  Estimated Energy Needs From (anton/kg)	30  Estimated Energy Needs To (anton/kg)	35  Estimated Energy Needs Calculated From (anton/kg)	2655  Estimated Energy Needs Calculated To (anton/kg)	3097  Weight used for calculations	current weight  Estimated Protein Needs Weight (lbs)	195.1 lb  Estimated Protein Needs Weight (kg)	88.5 kg  Estimated Protein Needs From (g/kg)	1.4  Estimated Protein Needs To (g/kg)	1.6  Estimated Protein Needs Calculated From (g/kg)	123.9  Estimated Protein Needs Calculated To (g/kg)	141.6  Estimated Fluid Needs Weight (lbs)	195.1 lb  Estimated Fluid Needs Weight (kg)	88.5 kg  Estimated Fluid Needs From (ml/kg)	30  Estimated Fluid Needs To (ml/kg)	35  Estimated Fluid Needs Calculated From (ml/kg)	2655  Estimated Fluid Needs Calculated To (ml/kg)	3097  Other Calculations	Based on Standards of Care pt within % ideal body weight, thus actual body weight used for all calculations. Needs adjusted for advanced age, clinical status and malnutrition (repletion).    Subjective: Patient is a 68M with PMHx of HTN, GERD and recent diagnosis of stage IV pancreatic cancer with numerous liver metastasis who presents to [facility] from home for second opinion regarding treatmnet options. Patient reports he was in good health prior when he developed painless jaundice in the end of July with darkening urine and acholic stools. States he initially presented to [facility] and was diagnosed with stage IV adenocarcinoma. Reports he underwent ERCP with 2 biliary stents placed and was discharged to follow up with Dr. Ayala (oncologist at [facility]). Reports at presentation later in August to outpatient oncologist office, patient was noted to be hypotensive and febrile c/f cholangitis requiring hospital admission for posterior segment 6 liver abscess that was drained percutaneously (8/15). Reports additional endoscopy was performed that time and patient is unsure if stents were exchanged (per ERCP report, stents are 7Fr and occupy R anterior and posterior hepatic ducts). States he was admitted for 2-3 weeks and reports upon discharged he followed up with Dr. Goldberg on Thursday for second opinion. Patient reports today that he has been experiencing progressive fatigue and weakness, as well as anorexia 2/2 poor appetite. States he has become weak to the point where he cannot walk on his own and requires a cane. States he is now presenting to [facility] at referral of a friend requesting consultation with Dr. Gorman.     RD spoke to pt at bedside for nutrition follow up evaluation. Pt endorsed he is "frustrated, agitated" and was brief with his answers/discussion. Pt stated his appetite is low, says he is "not eating," as confirmed by pt's RN on unit. Pt noted no difficulties chewing/swallowing. Pt had complaints of some nausea at time of evaluation; no emesis noted; most recent BM on 9/11/23 per nursing; distention noted. Labs reviewed: elevated POCT BG (138), serum Glucose (139), tBili (12.8), ALP (414), AST (74), low Na (130), low Ca (8.6), RD to continue to monitor trends. Pt had no updated requests/nutrition concerns. RD reinforced some education in regards to the importance of adequate macro and micronutrients, as well as hydration to support ADLs, maintain energy levels and overall functional/nutritional status. General healthful education provided. Nutrient-dense foods promoted. Pt appeared somewhat receptive and verbalized understanding. No additional nutrition-related concerns. RD will remain available. Additional nutrition recommendations listed below to follow.     Previous Nutrition Diagnosis: Malnutrition of severe degree in the context of chronic illness/injury related to inadequate PO intakes secondary to pt's condition/clinical status as evidenced by moderate to severe muscle/fat loss per NFPE, ~29% wt loss in <1 year, </=75% [id number]    Active [ x ]  Resolved [   ]    If resolved, new PES:     Goal: Pt to consistently meet at least 75% of EEE via tolerated route that is consistent with goals of care and will no longer exhibit malnutrition during hospital stay    Recommendations:  1. Continue with current diet order (low fat diet)    >>consider implementing oral nutrition supplement such as Ensure Max 3x/day (150kcal, 30g protein per serving) to optimize nutrient intakes, and/or provide nourishments as well   **consider implementing appetite stimulant**  2. Encourage pt to meet nutritional needs as able   3. Monitor PO intakes, trend weights (weekly), monitor skin integrity, monitor labs (electrolytes, CMP), monitor GI function  4. Encourage adherence to diet education (reinforce as able)   5. Consider micronutrient supplementation (multivitamin, thiamine)  6. Pain and bowel regimen per team   7. Will continue to assess/honor preferences as able   8. Align nutrition interventions with goals of care at all times     Education: RD reinforced some education in regards to the importance of adequate macro and micronutrients, as well as hydration to support ADLs, maintain energy levels and overall functional/nutritional status. General healthful education provided. Nutrient-dense foods promoted. Pt appeared somewhat receptive and verbalized understanding.     Risk Level: High [ x ] Moderate [   ] Low [   ]

## 2023-09-15 NOTE — PROGRESS NOTE ADULT - ASSESSMENT
Impression     History of pancreatic adenocarcinoma complicated by extensive bilobar hepatic   metastatic disease status post biliary stenting x 2 presents with MDR hepatic   subcapsular abscess status post PTC drain - cultures positive for ESBL Klebsiella  Klebsiella pneumoniae as well as Enterococcus faecium (VRE)     Plan    1. Continue meropenem 1 g IV three times daily   2. Continue daptomycin 800 mg IV daily - check baseline Cpk  3. Continue to follow admission blood cultures as well as abscess culture   4. Initiate fluconazole 400 mg by mouth load - then 200 mg by mouth daily   5. Discussed with surgery - anticipate ultimate discharge on highly bioavailable  oral antimicrobial regimen with drain    Thank you kindly for this referral.     Alex Umanzor MD  [phone] Impression     History of pancreatic adenocarcinoma complicated by extensive bilobar hepatic   metastatic disease status post biliary stenting x 2 presents with MDR hepatic   subcapsular abscess status post PTC drain - cultures positive for ESBL Klebsiella  Klebsiella pneumoniae as well as Enterococcus faecium (VRE)     Plan    1. Continue meropenem 1 g IV three times daily   2. Continue daptomycin 800 mg IV daily - check baseline Cpk  3. Continue to follow admission blood cultures as well as abscess culture   4. Initiate fluconazole 400 mg by mouth load - then 200 mg by mouth daily   5. Discussed with surgery - anticipate ultimate discharge on highly bioavailable  oral antimicrobial regimen with drains    Thank you kindly for this referral.     Alex Umanzor MD  [phone] Impression     History of pancreatic adenocarcinoma complicated by extensive bilobar hepatic   metastatic disease status post biliary stenting x 2 presents with MDR hepatic   subcapsular abscess status post PTC drain - cultures positive for ESBL Klebsiella  Klebsiella pneumoniae as well as Enterococcus faecium (VRE)     Plan    1. Continue meropenem 1 g IV three times daily   2. Continue daptomycin 800 mg IV daily - check baseline Cpk  3. Continue to follow admission blood cultures as well as abscess culture   4. Initiate fluconazole 400 mg by mouth load - then 200 mg by mouth daily   5. Anticipate ultimate discharge on highly bioavailable oral antibiotic regimen   with drains and pain management - linezolid 600 mg by mouth twice daily   and moxifloxacin 400 mg by mouth daily x 14 days     Thank you kindly for this referral.     Alex Umanzor MD  [phone] Impression     History of pancreatic adenocarcinoma complicated by extensive bilobar hepatic   metastatic disease status post biliary stenting x 2 presents with MDR hepatic   subcapsular abscess status post PTC drain - cultures positive for ESBL Klebsiella  Klebsiella pneumoniae as well as Enterococcus faecium (VRE)     Plan    1. Continue meropenem 1000 mg IV three times daily   2. Continue daptomycin 800 mg IV daily - check baseline Cpk  3. Continue to follow admission blood cultures as well as abscess culture   4. Initiate fluconazole 400 mg by mouth load - then 200 mg by mouth daily   5. Anticipate ultimate discharge on highly bioavailable oral antibiotic regimen   with drains and pain management - linezolid 600 mg by mouth twice daily   and moxifloxacin 400 mg by mouth daily x 14 days     Thank you kindly for this referral.     Aelx Umanzor MD  [phone]

## 2023-09-15 NOTE — PROGRESS NOTE ADULT - ASSESSMENT
68M with PMHx of HTN, GERD and recent diagnosis of stage IV pancreatic cancer with numerous liver metastasis who presents to [facility] from home for second opinion regarding treatmnet options. Patient reports he was in good health prior when he developed painless jaundice in the end of July with darkening urine and acholic stools. States he initially presented to [facility] and was diagnosed with stage IV adenocarcinoma. Reports he underwent ERCP with 2 biliary stents placed and was discharged to follow up with Dr. Ayala (oncologist at [facility]). Reports at presentation later in August to outpatient oncologist office, patient was noted to be hypotensive and febrile c/f cholangitis requiring hospital admission for posterior segment 6 liver abscess that was drained percutaneously (8/15). Reports additional endoscopy was performed that time and patient is unsure if stents were exchanged (per ERCP report, stents are 7Fr and occupy R anterior and posterior hepatic ducts). States he was admitted for 2-3 weeks and reports upon discharged he followed up with Dr. Goldberg on Thursday for second opinion. Patient reports today that he has been experiencing progressive fatigue and weakness, as well as anorexia 2/2 poor appetite. States he has become weak to the point where he cannot walk on his own and requires a cane. States he is now presenting to [facility] at referral of a friend requesting consultation with Dr. Gorman. Now s/p PTC placement and drainage of 2.5L of clear, yellow ascites (9/12). Preoperatively, received 1 unit prbc yesterday, FFPx2, lasix x1. 500cc albumin given during case. After intubation, patient was hypotensive requiring Beka. Postoperatively, pt was extubated and VS wnl, off beka. Patient admitted to SICU for further HD monitoring. SDU to tele (9/13).    Regular diet w/ IVF  Pain/nausea control PRN  IV meropenem (9/15-) and daptomycin (9/15-)  Coreg 6.25mg bid  PPI  SQL/SCDs/IS/OOB  R posterior IR drain (hepatic abscess)  Anterior PTC drain  Dispo: MARIE

## 2023-09-15 NOTE — PROGRESS NOTE ADULT - SUBJECTIVE AND OBJECTIVE BOX
O/N Events:    Subjective/ROS: Patient seen and examined at bedside   much more alert today   skin is jaundiced, PCT drain in place, IR drain in place       12pt ROS otherwise negative.        PHYSICAL EXAM  General: NAD, skin is jaundiced   Head: NC/AT; MMM; PERRL; EOMI;  Neck: Supple; no JVD  Respiratory: CTAB; no wheezes/rales/rhonchi  Cardiovascular: Regular rhythm/rate; S1/S2+, no murmurs, rubs gallops   Gastrointestinal: Soft;NT. mild distension, PCT drain in place draining biliary fluid bowel sounds normal and present  Extremities: WWP; no edema/cyanosis  Neurological: A&Ox3, CNII-XII grossly intact; no obvious focal deficits    Vital Signs Last 24 Hrs  T(C): 36.8 (14 Sep 2023 09:27), Max: 36.9 (14 Sep 2023 05:44)  T(F): 98.2 (14 Sep 2023 09:27), Max: 98.5 (14 Sep 2023 05:44)  HR: 60 (14 Sep 2023 08:55) (60 - 72)  BP: 169/72 (14 Sep 2023 08:55) (117/69 - 169/72)  BP(mean): 104 (14 Sep 2023 08:55) (85 - 104)  RR: 17 (14 Sep 2023 08:55) (17 - 17)  SpO2: 98% (14 Sep 2023 08:55) (96% - 99%)    Parameters below as of 14 Sep 2023 08:55  Patient On (Oxygen Delivery Method): room air        MEDICATIONS  (STANDING):  carvedilol 6.25 milliGRAM(s) Oral every 12 hours  chlorhexidine 2% Cloths 1 Application(s) Topical <User Schedule>  dextrose 5% + sodium chloride 0.45%. 1000 milliLiter(s) (120 mL/Hr) IV Continuous <Continuous>  dextrose 5%. 1000 milliLiter(s) (50 mL/Hr) IV Continuous <Continuous>  dextrose 5%. 1000 milliLiter(s) (100 mL/Hr) IV Continuous <Continuous>  dextrose 50% Injectable 12.5 Gram(s) IV Push once  dextrose 50% Injectable 25 Gram(s) IV Push once  dextrose 50% Injectable 25 Gram(s) IV Push once  enoxaparin Injectable 40 milliGRAM(s) SubCutaneous every 24 hours  glucagon  Injectable 1 milliGRAM(s) IntraMuscular once  influenza  Vaccine (HIGH DOSE) 0.7 milliLiter(s) IntraMuscular once  insulin lispro (ADMELOG) corrective regimen sliding scale   SubCutaneous at bedtime  insulin lispro (ADMELOG) corrective regimen sliding scale   SubCutaneous three times a day before meals  magnesium sulfate  IVPB 2 Gram(s) IV Intermittent once  melatonin 5 milliGRAM(s) Oral at bedtime  piperacillin/tazobactam IVPB.. 3.375 Gram(s) IV Intermittent every 8 hours  potassium chloride   Powder 40 milliEquivalent(s) Oral once  potassium chloride  10 mEq/100 mL IVPB 10 milliEquivalent(s) IV Intermittent every 1 hour  potassium phosphate IVPB 15 milliMole(s) IV Intermittent once    MEDICATIONS  (PRN):  dextrose Oral Gel 15 Gram(s) Oral once PRN Blood Glucose LESS THAN 70 milliGRAM(s)/deciliter  HYDROmorphone   Tablet 2 milliGRAM(s) Oral every 3 hours PRN Moderate Pain (4 - 6)  ondansetron Injectable 4 milliGRAM(s) IV Push every 8 hours PRN Nausea and/or Vomiting          No Known Allergies      LABS                        10.6   8.97  )-----------( 241      ( 13 Sep 2023 05:30 )             32.0     09-13    137  |  102  |  27<H>  ----------------------------<  166<H>  3.6   |  25  |  1.38<H>    Ca    8.4      13 Sep 2023 05:30  Phos  2.7     09-13  Mg     1.7     09-13    TPro  6.5  /  Alb  2.2<L>  /  TBili  14.0<H>  /  DBili  x   /  AST  105<H>  /  ALT  44  /  AlkPhos  536<H>  09-13    PT/INR - ( 13 Sep 2023 06:17 )   PT: 15.6 sec;   INR: 1.38          PTT - ( 13 Sep 2023 06:17 )  PTT:32.2 sec  Urinalysis Basic - ( 13 Sep 2023 05:30 )    Color: x / Appearance: x / SG: x / pH: x  Gluc: 166 mg/dL / Ketone: x  / Bili: x / Urobili: x   Blood: x / Protein: x / Nitrite: x   Leuk Esterase: x / RBC: x / WBC x   Sq Epi: x / Non Sq Epi: x / Bacteria: x            Culture - Body Fluid with Gram Stain (collected 09-12-23 @ 16:25)  Source: Peritoneal Peritoneal Fluid RLQ Abdomen  Gram Stain (09-12-23 @ 18:26):    No organisms seen    Rare WBC's  Preliminary Report (09-13-23 @ 09:05):    No growth to date    Culture - Body Fluid with Gram Stain (collected 09-12-23 @ 16:23)  Source: Bile biliary fluid  Gram Stain (09-12-23 @ 18:09):    No organisms seen    No WBC's seen.  Preliminary Report (09-13-23 @ 09:53):    Culture in progress        IMAGING/EKG/ETC   O/N Events:    Subjective/ROS: Patient seen and examined at bedside   much more alert today   skin is jaundiced, PCT drain in place, IR drain in place   with abdominal pain, wife is in ER with LGIB and would not be able to go home today     12pt ROS otherwise negative.        PHYSICAL EXAM  General: NAD, skin is jaundiced   Head: NC/AT; MMM; PERRL; EOMI;  Neck: Supple; no JVD  Respiratory: CTAB; no wheezes/rales/rhonchi  Cardiovascular: Regular rhythm/rate; S1/S2+, no murmurs, rubs gallops   Gastrointestinal: Soft;NT. mild distension, PCT drain in place draining biliary fluid bowel sounds normal and present  Extremities: WWP; no edema/cyanosis  Neurological: A&Ox3, CNII-XII grossly intact; no obvious focal deficits    Vital Signs Last 24 Hrs  T(C): 36.8 (14 Sep 2023 09:27), Max: 36.9 (14 Sep 2023 05:44)  T(F): 98.2 (14 Sep 2023 09:27), Max: 98.5 (14 Sep 2023 05:44)  HR: 60 (14 Sep 2023 08:55) (60 - 72)  BP: 169/72 (14 Sep 2023 08:55) (117/69 - 169/72)  BP(mean): 104 (14 Sep 2023 08:55) (85 - 104)  RR: 17 (14 Sep 2023 08:55) (17 - 17)  SpO2: 98% (14 Sep 2023 08:55) (96% - 99%)    Parameters below as of 14 Sep 2023 08:55  Patient On (Oxygen Delivery Method): room air        MEDICATIONS  (STANDING):  carvedilol 6.25 milliGRAM(s) Oral every 12 hours  chlorhexidine 2% Cloths 1 Application(s) Topical <User Schedule>  dextrose 5% + sodium chloride 0.45%. 1000 milliLiter(s) (120 mL/Hr) IV Continuous <Continuous>  dextrose 5%. 1000 milliLiter(s) (50 mL/Hr) IV Continuous <Continuous>  dextrose 5%. 1000 milliLiter(s) (100 mL/Hr) IV Continuous <Continuous>  dextrose 50% Injectable 12.5 Gram(s) IV Push once  dextrose 50% Injectable 25 Gram(s) IV Push once  dextrose 50% Injectable 25 Gram(s) IV Push once  enoxaparin Injectable 40 milliGRAM(s) SubCutaneous every 24 hours  glucagon  Injectable 1 milliGRAM(s) IntraMuscular once  influenza  Vaccine (HIGH DOSE) 0.7 milliLiter(s) IntraMuscular once  insulin lispro (ADMELOG) corrective regimen sliding scale   SubCutaneous at bedtime  insulin lispro (ADMELOG) corrective regimen sliding scale   SubCutaneous three times a day before meals  magnesium sulfate  IVPB 2 Gram(s) IV Intermittent once  melatonin 5 milliGRAM(s) Oral at bedtime  piperacillin/tazobactam IVPB.. 3.375 Gram(s) IV Intermittent every 8 hours  potassium chloride   Powder 40 milliEquivalent(s) Oral once  potassium chloride  10 mEq/100 mL IVPB 10 milliEquivalent(s) IV Intermittent every 1 hour  potassium phosphate IVPB 15 milliMole(s) IV Intermittent once    MEDICATIONS  (PRN):  dextrose Oral Gel 15 Gram(s) Oral once PRN Blood Glucose LESS THAN 70 milliGRAM(s)/deciliter  HYDROmorphone   Tablet 2 milliGRAM(s) Oral every 3 hours PRN Moderate Pain (4 - 6)  ondansetron Injectable 4 milliGRAM(s) IV Push every 8 hours PRN Nausea and/or Vomiting          No Known Allergies      LABS                        10.6   8.97  )-----------( 241      ( 13 Sep 2023 05:30 )             32.0     09-13    137  |  102  |  27<H>  ----------------------------<  166<H>  3.6   |  25  |  1.38<H>    Ca    8.4      13 Sep 2023 05:30  Phos  2.7     09-13  Mg     1.7     09-13    TPro  6.5  /  Alb  2.2<L>  /  TBili  14.0<H>  /  DBili  x   /  AST  105<H>  /  ALT  44  /  AlkPhos  536<H>  09-13    PT/INR - ( 13 Sep 2023 06:17 )   PT: 15.6 sec;   INR: 1.38          PTT - ( 13 Sep 2023 06:17 )  PTT:32.2 sec  Urinalysis Basic - ( 13 Sep 2023 05:30 )    Color: x / Appearance: x / SG: x / pH: x  Gluc: 166 mg/dL / Ketone: x  / Bili: x / Urobili: x   Blood: x / Protein: x / Nitrite: x   Leuk Esterase: x / RBC: x / WBC x   Sq Epi: x / Non Sq Epi: x / Bacteria: x            Culture - Body Fluid with Gram Stain (collected 09-12-23 @ 16:25)  Source: Peritoneal Peritoneal Fluid RLQ Abdomen  Gram Stain (09-12-23 @ 18:26):    No organisms seen    Rare WBC's  Preliminary Report (09-13-23 @ 09:05):    No growth to date    Culture - Body Fluid with Gram Stain (collected 09-12-23 @ 16:23)  Source: Bile biliary fluid  Gram Stain (09-12-23 @ 18:09):    No organisms seen    No WBC's seen.  Preliminary Report (09-13-23 @ 09:53):    Culture in progress        IMAGING/EKG/ETC

## 2023-09-15 NOTE — PROGRESS NOTE ADULT - SUBJECTIVE AND OBJECTIVE BOX
Infectious Disease Attending     Antibiotics  Daptomycin 800 mg IV daily (day #2)   Meropenem 1000 mg IV three times daily (day #2)     Seen and evaluated; afebrile and without complaint     Physical Examination    Vital Signs: T 98.1 F /74 Sat 99% RA  Respiratory: Diminished breath sounds at bases   Abdominal: Soft + mild tenderness to palpation + drains in place   Extremities: No edema     Laboratory Data   Leukocytes 12.8  Platelets 163  Creatinine 1.1  Direct bilirubin 9.5  Alkaline phosphatase 414    Microbiology   Bile culture (9/12) Citrobacter freundii                            Klebsiella pneumoniae (ESBL)                             Enterococcus faecalis                             Enterococcus faecium (VRE)                             Candida tropicalis   Blood cultures (9/11) no growth x 3 days  Infectious Disease Attending     Antibiotics  Daptomycin 800 mg IV daily (day #2)   Meropenem 1000 mg IV three times daily (day #2)     Seen and evaluated; afebrile with complaint of abdominal pain    Physical Examination    Vital Signs: T 98.1 F /74 Sat 99% RA  HEENT: Sclerae icteric  Respiratory: Diminished breath sounds at bases   Abdominal: Soft + tenderness to palpation + drains in place   Extremities: No edema   Dermatologic: Jaundiced    Laboratory Data   Leukocytes 12.8  Platelets 163  Creatinine 1.1  Direct bilirubin 9.5  Alkaline phosphatase 414    Microbiology   Bile culture (9/12) Citrobacter freundii                            Klebsiella pneumoniae (ESBL)                             Enterococcus faecalis                             Enterococcus faecium (VRE)                             Candida tropicalis   Blood cultures (9/11) no growth x 3 days

## 2023-09-15 NOTE — PROGRESS NOTE ADULT - SUBJECTIVE AND OBJECTIVE BOX
SUBJECTIVE:  Patient was evaluated at bedside this AM with chief resident. Patient is disinterested in remaining in the hospital or eating and reports frustration with not being home. Patient is currently denying nausea or vomiting. Patient denies pain currently.    MEDICATIONS  (STANDING):  albumin human 25% IVPB 50 milliLiter(s) IV Intermittent once  carvedilol 6.25 milliGRAM(s) Oral every 12 hours  chlorhexidine 2% Cloths 1 Application(s) Topical <User Schedule>  DAPTOmycin IVPB 800 milliGRAM(s) IV Intermittent every 24 hours  dextrose 5% + sodium chloride 0.9%. 1000 milliLiter(s) (120 mL/Hr) IV Continuous <Continuous>  dextrose 5%. 1000 milliLiter(s) (100 mL/Hr) IV Continuous <Continuous>  dextrose 5%. 1000 milliLiter(s) (50 mL/Hr) IV Continuous <Continuous>  dextrose 50% Injectable 25 Gram(s) IV Push once  dextrose 50% Injectable 12.5 Gram(s) IV Push once  dextrose 50% Injectable 25 Gram(s) IV Push once  enoxaparin Injectable 40 milliGRAM(s) SubCutaneous every 24 hours  glucagon  Injectable 1 milliGRAM(s) IntraMuscular once  influenza  Vaccine (HIGH DOSE) 0.7 milliLiter(s) IntraMuscular once  insulin lispro (ADMELOG) corrective regimen sliding scale   SubCutaneous three times a day before meals  insulin lispro (ADMELOG) corrective regimen sliding scale   SubCutaneous at bedtime  melatonin 5 milliGRAM(s) Oral at bedtime  meropenem  IVPB 1000 milliGRAM(s) IV Intermittent every 8 hours  sodium chloride 1 Gram(s) Oral two times a day  urea Oral Powder 15 Gram(s) Oral daily    MEDICATIONS  (PRN):  dextrose Oral Gel 15 Gram(s) Oral once PRN Blood Glucose LESS THAN 70 milliGRAM(s)/deciliter  HYDROmorphone   Tablet 2 milliGRAM(s) Oral every 3 hours PRN Moderate Pain (4 - 6)  ondansetron Injectable 4 milliGRAM(s) IV Push every 8 hours PRN Nausea and/or Vomiting      Vital Signs Last 24 Hrs  T(C): 36.5 (15 Sep 2023 10:00), Max: 36.8 (14 Sep 2023 14:20)  T(F): 97.7 (15 Sep 2023 10:00), Max: 98.2 (14 Sep 2023 14:20)  HR: 68 (15 Sep 2023 08:52) (60 - 82)  BP: 142/81 (15 Sep 2023 08:52) (125/60 - 158/73)  BP(mean): 105 (15 Sep 2023 08:52) (87 - 105)  RR: 17 (15 Sep 2023 08:52) (16 - 17)  SpO2: 99% (15 Sep 2023 08:52) (92% - 99%)    Parameters below as of 15 Sep 2023 08:52  Patient On (Oxygen Delivery Method): room air        Physical Exam:  General: NAD, resting comfortably in bed  Pulmonary: Nonlabored breathing, no respiratory distress  Cardiovascular: NSR  Abdominal: soft, appropriately tender at drain sites, ND; R flank FABIANO draining scant brown/white tinged fluid; LUQ drain producing bilious fluid      I&O's Summary    14 Sep 2023 07:01  -  15 Sep 2023 07:00  --------------------------------------------------------  IN: 2880 mL / OUT: 1110 mL / NET: 1770 mL    15 Sep 2023 07:01  -  15 Sep 2023 12:25  --------------------------------------------------------  IN: 300 mL / OUT: 0 mL / NET: 300 mL        LABS:                        9.7    12.83 )-----------( 163      ( 15 Sep 2023 06:19 )             29.4     09-15    130<L>  |  100  |  20  ----------------------------<  139<H>  3.5   |  21<L>  |  1.16    Ca    8.0<L>      15 Sep 2023 06:19  Phos  3.0     09-15  Mg     1.8     09-15    TPro  6.1  /  Alb  1.8<L>  /  TBili  12.8<H>  /  DBili  9.5<H>  /  AST  74<H>  /  ALT  33  /  AlkPhos  414<H>  09-15      Urinalysis Basic - ( 15 Sep 2023 06:19 )    Color: x / Appearance: x / SG: x / pH: x  Gluc: 139 mg/dL / Ketone: x  / Bili: x / Urobili: x   Blood: x / Protein: x / Nitrite: x   Leuk Esterase: x / RBC: x / WBC x   Sq Epi: x / Non Sq Epi: x / Bacteria: x      CAPILLARY BLOOD GLUCOSE      POCT Blood Glucose.: 138 mg/dL (15 Sep 2023 06:24)  POCT Blood Glucose.: 109 mg/dL (14 Sep 2023 21:26)  POCT Blood Glucose.: 152 mg/dL (14 Sep 2023 17:08)    LIVER FUNCTIONS - ( 15 Sep 2023 06:19 )  Alb: 1.8 g/dL / Pro: 6.1 g/dL / ALK PHOS: 414 U/L / ALT: 33 U/L / AST: 74 U/L / GGT: x             RADIOLOGY & ADDITIONAL STUDIES:

## 2023-09-15 NOTE — PROGRESS NOTE ADULT - ASSESSMENT
Patient is a 68M with PMHx of HTN, GERD and recent diagnosis of stage IV pancreatic cancer with numerous liver metastasis presented to [facility] and diagnosed with stage IV adenocarcinoma, s/p liver abscess drainage x 6 8/15, s/p ERCp with two stent placement as per records, getting paracentesis with possibly PCT drain placement, history of klebsiella bacteremia from outpatient basis , patient pending upgrade to ICU for monitoring, biliary drain placed, 2.5 L fluid drained which was clear and yellow, hypotensive during procedure, 2 FFPs given pre-op,   biliary tree stents visualized and in place      PRN FFPS as needed pre op for elevated coag levels   IR s/p paracentesis with PTC drain placement, bilirubin continues to be elevated  acute kidney injury on CKD is improving   peritoneal cultures / blood culture - NGTD   Bile culture - growing preliminarily VRE, klebsiella, citrobacter  ID evaluated patient, zosyn adjusted to merrem / dapto    IVF diet as per surgery   electrolytes replete - potassium PO    normocytic anemia s/p PRBCs transfusions, HB today improved at 10   hemodynamics are stable after PRBC / fluid resuscitation - monitor for orthostasis   dvt ppx as per surgery   HTN- continue on coreg              Patient is a 68M with PMHx of HTN, GERD and recent diagnosis of stage IV pancreatic cancer with numerous liver metastasis presented to [facility] and diagnosed with stage IV adenocarcinoma, s/p liver abscess drainage x 6 8/15, s/p ERCp with two stent placement as per records, getting paracentesis with possibly PCT drain placement, history of klebsiella bacteremia from outpatient basis , patient pending upgrade to ICU for monitoring, biliary drain placed, 2.5 L fluid drained which was clear and yellow, hypotensive during procedure, 2 FFPs given pre-op,   biliary tree stents visualized and in place      PRN FFPS as needed pre op for elevated coag levels   IR s/p paracentesis with PTC drain placement, bilirubin continues to be elevated  acute kidney injury on CKD is improving   peritoneal cultures / blood culture - NGTD   Bile culture - growing preliminarily VRE, klebsiella, citrobacter  ID evaluated patient, zosyn adjusted to merrem / dapto   Hypoalbuminemia can lead to worsening third spacing, would recommend IV albumin infusion once    IVF diet as per surgery   electrolytes replete - potassium PO    normocytic anemia s/p PRBCs transfusions, HB stable   hemodynamics are stable after PRBC / fluid resuscitation - monitor for orthostasis   dvt ppx as per surgery   HTN- continue on coreg              Patient is a 68M with PMHx of HTN, GERD and recent diagnosis of stage IV pancreatic cancer with numerous liver metastasis presented to [facility] and diagnosed with stage IV adenocarcinoma, s/p liver abscess drainage x 6 8/15, s/p ERCp with two stent placement as per records, getting paracentesis with possibly PCT drain placement, history of klebsiella bacteremia from outpatient basis , patient pending upgrade to ICU for monitoring, biliary drain placed, 2.5 L fluid drained which was clear and yellow, hypotensive during procedure, 2 FFPs given pre-op,   biliary tree stents visualized and in place    Mild euvolemic hyponatremia which is not auto correcting due to poor oral intake and liver disease, BID Salt tablets, Urea daily, Isotonic IVF D5NS   Hypoalbuminemia can lead to worsening third spacing, would recommend IV albumin infusion once   PRN FFPS as needed pre op for elevated coag levels   IR s/p paracentesis with PTC drain placement, bilirubin continues to be elevated  acute kidney injury on CKD is improving   peritoneal cultures / blood culture - NGTD   Bile culture - growing preliminarily VRE, klebsiella, citrobacter  ID evaluated patient, zosyn adjusted to merrem / dapto    IVF diet as per surgery   normocytic anemia s/p PRBCs transfusions, HB stable   hemodynamics are stable after PRBC / fluid resuscitation - monitor for orthostasis   dvt ppx as per surgery   HTN- continue on coreg

## 2023-09-16 NOTE — PROGRESS NOTE ADULT - SUBJECTIVE AND OBJECTIVE BOX
O/N Events:    Subjective/ROS: Patient seen and examined at bedside   much more alert today   skin is less jaundiced, PCT drain in place, IR drain in place   abdominal pain improved    12pt ROS otherwise negative.        PHYSICAL EXAM  General: NAD, skin is jaundiced   Head: NC/AT; MMM; PERRL; EOMI;  Neck: Supple; no JVD  Respiratory: CTAB; no wheezes/rales/rhonchi  Cardiovascular: Regular rhythm/rate; S1/S2+, no murmurs, rubs gallops   Gastrointestinal: Soft;NT. mild distension, PCT drain in place draining biliary fluid bowel sounds normal and present  Extremities: WWP; no edema/cyanosis  Neurological: A&Ox3, CNII-XII grossly intact; no obvious focal deficits  Vital Signs Last 24 Hrs  T(C): 36.9 (16 Sep 2023 09:00), Max: 36.9 (15 Sep 2023 21:15)  T(F): 98.4 (16 Sep 2023 09:00), Max: 98.5 (15 Sep 2023 21:15)  HR: 74 (16 Sep 2023 08:51) (64 - 80)  BP: 141/65 (16 Sep 2023 08:51) (118/57 - 149/75)  BP(mean): 94 (16 Sep 2023 08:51) (81 - 103)  RR: 18 (16 Sep 2023 08:51) (16 - 18)  SpO2: 94% (16 Sep 2023 08:51) (93% - 98%)    Parameters below as of 16 Sep 2023 08:51  Patient On (Oxygen Delivery Method): room air    MEDICATIONS  (STANDING):  bisacodyl Suppository 10 milliGRAM(s) Rectal every 24 hours  carvedilol 6.25 milliGRAM(s) Oral every 12 hours  chlorhexidine 2% Cloths 1 Application(s) Topical <User Schedule>  DAPTOmycin IVPB 800 milliGRAM(s) IV Intermittent every 24 hours  dextrose 5% + sodium chloride 0.9%. 1000 milliLiter(s) (120 mL/Hr) IV Continuous <Continuous>  dextrose 5%. 1000 milliLiter(s) (50 mL/Hr) IV Continuous <Continuous>  dextrose 5%. 1000 milliLiter(s) (100 mL/Hr) IV Continuous <Continuous>  dextrose 50% Injectable 25 Gram(s) IV Push once  dextrose 50% Injectable 25 Gram(s) IV Push once  dextrose 50% Injectable 12.5 Gram(s) IV Push once  glucagon  Injectable 1 milliGRAM(s) IntraMuscular once  influenza  Vaccine (HIGH DOSE) 0.7 milliLiter(s) IntraMuscular once  insulin lispro (ADMELOG) corrective regimen sliding scale   SubCutaneous three times a day before meals  insulin lispro (ADMELOG) corrective regimen sliding scale   SubCutaneous at bedtime  melatonin 5 milliGRAM(s) Oral at bedtime  meropenem  IVPB 1000 milliGRAM(s) IV Intermittent every 8 hours  mirtazapine 7.5 milliGRAM(s) Oral at bedtime  pantoprazole  Injectable 40 milliGRAM(s) IV Push every 12 hours  polyethylene glycol 3350 17 Gram(s) Oral daily  potassium chloride    Tablet ER 40 milliEquivalent(s) Oral once  sodium chloride 1 Gram(s) Oral two times a day  sucralfate suspension 1 Gram(s) Oral every 6 hours  urea Oral Powder 15 Gram(s) Oral daily    MEDICATIONS  (PRN):  dextrose Oral Gel 15 Gram(s) Oral once PRN Blood Glucose LESS THAN 70 milliGRAM(s)/deciliter  HYDROmorphone   Tablet 2 milliGRAM(s) Oral every 3 hours PRN Moderate Pain (4 - 6)  ondansetron Injectable 8 milliGRAM(s) IV Push every 6 hours PRN Nausea and/or Vomiting              No Known Allergies      LABS  MEDICATIONS  (STANDING):  bisacodyl Suppository 10 milliGRAM(s) Rectal every 24 hours  carvedilol 6.25 milliGRAM(s) Oral every 12 hours  chlorhexidine 2% Cloths 1 Application(s) Topical <User Schedule>  DAPTOmycin IVPB 800 milliGRAM(s) IV Intermittent every 24 hours  dextrose 5% + sodium chloride 0.9%. 1000 milliLiter(s) (120 mL/Hr) IV Continuous <Continuous>  dextrose 5%. 1000 milliLiter(s) (100 mL/Hr) IV Continuous <Continuous>  dextrose 5%. 1000 milliLiter(s) (50 mL/Hr) IV Continuous <Continuous>  dextrose 50% Injectable 25 Gram(s) IV Push once  dextrose 50% Injectable 25 Gram(s) IV Push once  dextrose 50% Injectable 12.5 Gram(s) IV Push once  glucagon  Injectable 1 milliGRAM(s) IntraMuscular once  influenza  Vaccine (HIGH DOSE) 0.7 milliLiter(s) IntraMuscular once  insulin lispro (ADMELOG) corrective regimen sliding scale   SubCutaneous at bedtime  insulin lispro (ADMELOG) corrective regimen sliding scale   SubCutaneous three times a day before meals  melatonin 5 milliGRAM(s) Oral at bedtime  meropenem  IVPB 1000 milliGRAM(s) IV Intermittent every 8 hours  mirtazapine 7.5 milliGRAM(s) Oral at bedtime  pantoprazole  Injectable 40 milliGRAM(s) IV Push every 12 hours  polyethylene glycol 3350 17 Gram(s) Oral daily  potassium chloride    Tablet ER 40 milliEquivalent(s) Oral once  sodium chloride 1 Gram(s) Oral two times a day  sucralfate suspension 1 Gram(s) Oral every 6 hours  urea Oral Powder 15 Gram(s) Oral daily    MEDICATIONS  (PRN):  dextrose Oral Gel 15 Gram(s) Oral once PRN Blood Glucose LESS THAN 70 milliGRAM(s)/deciliter  HYDROmorphone   Tablet 2 milliGRAM(s) Oral every 3 hours PRN Moderate Pain (4 - 6)  ondansetron Injectable 8 milliGRAM(s) IV Push every 6 hours PRN Nausea and/or Vomiting                        10.6   8.97  )-----------( 241      ( 13 Sep 2023 05:30 )             32.0     09-13    137  |  102  |  27<H>  ----------------------------<  166<H>  3.6   |  25  |  1.38<H>    Ca    8.4      13 Sep 2023 05:30  Phos  2.7     09-13  Mg     1.7     09-13    TPro  6.5  /  Alb  2.2<L>  /  TBili  14.0<H>  /  DBili  x   /  AST  105<H>  /  ALT  44  /  AlkPhos  536<H>  09-13    PT/INR - ( 13 Sep 2023 06:17 )   PT: 15.6 sec;   INR: 1.38          PTT - ( 13 Sep 2023 06:17 )  PTT:32.2 sec  Urinalysis Basic - ( 13 Sep 2023 05:30 )    Color: x / Appearance: x / SG: x / pH: x  Gluc: 166 mg/dL / Ketone: x  / Bili: x / Urobili: x   Blood: x / Protein: x / Nitrite: x   Leuk Esterase: x / RBC: x / WBC x   Sq Epi: x / Non Sq Epi: x / Bacteria: x            Culture - Body Fluid with Gram Stain (collected 09-12-23 @ 16:25)  Source: Peritoneal Peritoneal Fluid RLQ Abdomen  Gram Stain (09-12-23 @ 18:26):    No organisms seen    Rare WBC's  Preliminary Report (09-13-23 @ 09:05):    No growth to date    Culture - Body Fluid with Gram Stain (collected 09-12-23 @ 16:23)  Source: Bile biliary fluid  Gram Stain (09-12-23 @ 18:09):    No organisms seen    No WBC's seen.  Preliminary Report (09-13-23 @ 09:53):    Culture in progress        IMAGING/EKG/ETC

## 2023-09-16 NOTE — BH CONSULTATION LIAISON ASSESSMENT NOTE - NSICDXBHTERTIARYDX_PSY_ALL_CORE
R/O MDD (major depressive disorder), recurrent episode   F33.9  R/O Depressive disorder due to separate medical condition   F06.30

## 2023-09-16 NOTE — PROGRESS NOTE ADULT - SUBJECTIVE AND OBJECTIVE BOX
Infectious Disease Attending     Antibiotics  Daptomycin 800 mg IV daily (day #3)   Meropenem 1000 mg IV three times daily (day #3)     Seen and evaluated; afebrile with complaint of abdominal pain  Status post nasogastric tube placement overnight for multiple  bouts of bilious emesis - self discontinued this morning     Physical Examination    Vital Signs: T 97.2 F /65 Sat 96% RA  HEENT: Sclerae icteric  Respiratory: Diminished breath sounds at bases   Abdominal: Soft + tenderness to palpation + drains in place   Extremities: No edema   Dermatologic: Jaundiced    Laboratory Data   Leukocytes 11.8  Platelets 132  Creatinine 1.2    Microbiology   Bile culture (9/12) Citrobacter freundii                            Klebsiella pneumoniae (ESBL)                             Enterococcus faecalis                             Enterococcus faecium (VRE)                             Candida tropicalis   Blood cultures (9/11) no growth x 4 days

## 2023-09-16 NOTE — PROGRESS NOTE ADULT - SUBJECTIVE AND OBJECTIVE BOX
ON Events: NGT placed for multiple episodes of bilious emesis. 300 cc immediately suctioned. Patient self dc'd NGT this AM.    SUBJECTIVE: Patient was seen and examined by chief resident. Patient resting comfortably in bed with no acute complaints. Denies nausea. +F/+BM.      Vital Signs Last 24 Hrs  T(C): 36.2 (16 Sep 2023 04:37), Max: 36.9 (15 Sep 2023 21:15)  T(F): 97.2 (16 Sep 2023 04:37), Max: 98.5 (15 Sep 2023 21:15)  HR: 77 (16 Sep 2023 03:57) (64 - 80)  BP: 140/66 (16 Sep 2023 03:57) (118/57 - 149/75)  BP(mean): 95 (16 Sep 2023 03:57) (81 - 105)  RR: 17 (16 Sep 2023 03:57) (16 - 18)  SpO2: 98% (16 Sep 2023 03:57) (93% - 99%)    Parameters below as of 16 Sep 2023 03:57  Patient On (Oxygen Delivery Method): room air        I&O's Summary    15 Sep 2023 07:01  -  16 Sep 2023 07:00  --------------------------------------------------------  IN: 3230 mL / OUT: 1230 mL / NET: 2000 mL        Physical Exam:  General Appearance: NAD, jaundice   Pulmonary: Nonlabored breathing, no respiratory distress  Cardiovascular: NSR  Abdomen: Soft, NTND  Extremities: WWP, SCD's in place     LABS:                        7.4    10.65 )-----------( 143      ( 16 Sep 2023 01:07 )             24.1     09-16    See Note  |  See Note  |  See Note  ----------------------------<  See Note  See Note   |  See Note  |  See Note    Ca    See Note      16 Sep 2023 01:07  Phos  1.8     09-16  Mg     1.1     09-16    TPro  See Note  /  Alb  See Note  /  TBili  See Note  /  DBili  See Note  /  AST  See Note  /  ALT  See Note  /  AlkPhos  See Note  09-16    PT/INR - ( 16 Sep 2023 01:07 )   PT: 19.2 sec;   INR: 1.71          PTT - ( 16 Sep 2023 01:07 )  PTT:45.9 sec  Urinalysis Basic - ( 16 Sep 2023 01:07 )    Color: x / Appearance: x / SG: x / pH: x  Gluc: See Note / Ketone: x  / Bili: x / Urobili: x   Blood: x / Protein: x / Nitrite: x   Leuk Esterase: x / RBC: x / WBC x   Sq Epi: x / Non Sq Epi: x / Bacteria: x       ON Events: NGT placed for multiple episodes of bilious emesis. 300 cc immediately suctioned. Patient self dc'd NGT this AM.    SUBJECTIVE: Patient was seen and examined by chief resident. Patient resting comfortably in bed with no acute complaints. Denies nausea. +F/+BM.      Vital Signs Last 24 Hrs  T(C): 36.2 (16 Sep 2023 04:37), Max: 36.9 (15 Sep 2023 21:15)  T(F): 97.2 (16 Sep 2023 04:37), Max: 98.5 (15 Sep 2023 21:15)  HR: 77 (16 Sep 2023 03:57) (64 - 80)  BP: 140/66 (16 Sep 2023 03:57) (118/57 - 149/75)  BP(mean): 95 (16 Sep 2023 03:57) (81 - 105)  RR: 17 (16 Sep 2023 03:57) (16 - 18)  SpO2: 98% (16 Sep 2023 03:57) (93% - 99%)    Parameters below as of 16 Sep 2023 03:57  Patient On (Oxygen Delivery Method): room air        I&O's Summary    15 Sep 2023 07:01  -  16 Sep 2023 07:00  --------------------------------------------------------  IN: 3230 mL / OUT: 1230 mL / NET: 2000 mL        Physical Exam:  General Appearance: NAD, jaundice   Pulmonary: Nonlabored breathing, no respiratory distress  Cardiovascular: NSR  Abdomen: Soft, NTND, PTC drain bilious, RJP serous  Extremities: WWP, SCD's in place     LABS:                        7.4    10.65 )-----------( 143      ( 16 Sep 2023 01:07 )             24.1     09-16    See Note  |  See Note  |  See Note  ----------------------------<  See Note  See Note   |  See Note  |  See Note    Ca    See Note      16 Sep 2023 01:07  Phos  1.8     09-16  Mg     1.1     09-16    TPro  See Note  /  Alb  See Note  /  TBili  See Note  /  DBili  See Note  /  AST  See Note  /  ALT  See Note  /  AlkPhos  See Note  09-16    PT/INR - ( 16 Sep 2023 01:07 )   PT: 19.2 sec;   INR: 1.71          PTT - ( 16 Sep 2023 01:07 )  PTT:45.9 sec  Urinalysis Basic - ( 16 Sep 2023 01:07 )    Color: x / Appearance: x / SG: x / pH: x  Gluc: See Note / Ketone: x  / Bili: x / Urobili: x   Blood: x / Protein: x / Nitrite: x   Leuk Esterase: x / RBC: x / WBC x   Sq Epi: x / Non Sq Epi: x / Bacteria: x

## 2023-09-16 NOTE — PROGRESS NOTE ADULT - ASSESSMENT
Impression     History of pancreatic adenocarcinoma complicated by extensive bilobar hepatic   metastatic disease status post biliary stenting x 2 presents with MDR hepatic   subcapsular abscess status post PTC drain - cultures positive for ESBL Klebsiella  Klebsiella pneumoniae as well as Enterococcus faecium (VRE)     Plan    1. Continue meropenem 1000 mg IV three times daily   2. Continue daptomycin 800 mg IV daily   3. Initiate fluconazole 400 IV load - then 200 mg IV daily   4. Continue head of bed elevated and aspiration precautions   5. Anticipate ultimate discharge on highly bioavailable oral antibiotic regimen   with drains and pain management - linezolid 600 mg by mouth twice daily   and moxifloxacin 400 mg by mouth daily x 14 days     Thank you kindly for this referral.     Alex Umanzor MD  [phone]

## 2023-09-16 NOTE — PROGRESS NOTE ADULT - ASSESSMENT
Patient is a 68M with PMHx of HTN, GERD and recent diagnosis of stage IV pancreatic cancer with numerous liver metastasis presented to [facility] and diagnosed with stage IV adenocarcinoma, s/p liver abscess drainage x 6 8/15, s/p ERCp with two stent placement as per records, getting paracentesis with possibly PCT drain placement, history of klebsiella bacteremia from outpatient basis , patient pending upgrade to ICU for monitoring, biliary drain placed, 2.5 L fluid drained which was clear and yellow, hypotensive during procedure, 2 FFPs given pre-op,   biliary tree stents visualized and in place    Mild euvolemic hyponatremia which is not auto correcting due to poor oral intake and liver disease - resolved on BID Salt tablets, Urea daily; DISCONTINUED Isotonic IVF D5NS   Hypoalbuminemia can lead to worsening third spacing, would recommend IV albumin infusion once   PRN FFPS as needed pre op for elevated coag levels   IR s/p paracentesis with PTC drain placement, bilirubin continues to be elevated  acute kidney injury on CKD - resolved   peritoneal cultures / blood culture - NGTD   Bile culture - growing preliminarily VRE, klebsiella, citrobacter  ID evaluated patient, zosyn adjusted to merrem / dapto, also gave reccs for PO meds for discharge- quinolone / zyvox     IVF diet as per surgery   normocytic anemia s/p PRBCs transfusions, HB stable   hemodynamics are stable after PRBC / fluid resuscitation - monitor for orthostasis   dvt ppx as per surgery   HTN- continue on coreg

## 2023-09-16 NOTE — BH CONSULTATION LIAISON ASSESSMENT NOTE - DIFFERENTIAL
specified depressive disorder vs depressive disorder due to a known medical condition r/o MDD recurrent episode

## 2023-09-16 NOTE — BH CONSULTATION LIAISON PROGRESS NOTE - NSBHASSESSMENTFT_PSY_ALL_CORE
69 yo male, with PPH of "depression" 15-20 years prior, now with PMH HTN, GERD and recent diagnosis of stage IV pancreatic cancer with numerous liver metastasis, presented to [facility] and diagnosed with stage IV adenocarcinoma, s/p liver abscess drainage x 6 8/15, s/p ERCp with two stent placement as per records, getting paracentesis with possibly PCT drain placement, history of klebsiella bacteremia from outpatient basis. Psychiatry consulted to evaluate for depression and provide recommendations.     On assessment, the patient meets criteria other specified depressive disorder vs depressive disorder due to a known medical condition. He reports his primary complaints as difficulty sleeping, poor appetite, and lack of motivation. He denies any symptoms concerning for bipolar illness or psychotic illness. He denies current substance use. He denies any SI or HI at this time. Patient was started on mirtazapine 7.5 mg nightly, patient reports improvement in both sleep and physical sx. There are no psychiatric contraindications for discharge.    Plan:  - continue mirtazapine 7.5 mg nightly  - no psychiatric contraindications for discharge  - psychiatry to follow as needed

## 2023-09-16 NOTE — BH CONSULTATION LIAISON ASSESSMENT NOTE - RISK ASSESSMENT
HE denies any SI or HI at this time, he is treatment oriented and cooperative throughout. No acute risk of harming himself or others at this time.

## 2023-09-16 NOTE — CHART NOTE - NSCHARTNOTEFT_GEN_A_CORE
Called to bedside by nurse for multiple episodes of dark, questionable coffee ground emesis? Started PPI BID, phenergan x1. EKG obtained, . Standing zofran. PCA noted patient appeared to be confused. Chief aware. Stat CBC, CMP, lytes, coags, T&S. NGT placed, 300cc of bilious emesis out. CXR/AXR stat for further evaluation/confirm NGT placement. Upon evaluation, patient is A&O x2-3, redirectable.    ICU Vital Signs Last 24 Hrs  T(C): 36.9 (15 Sep 2023 21:15), Max: 36.9 (15 Sep 2023 21:15)  T(F): 98.5 (15 Sep 2023 21:15), Max: 98.5 (15 Sep 2023 21:15)  HR: 73 (15 Sep 2023 21:15) (60 - 80)  BP: 126/67 (15 Sep 2023 21:15) (118/57 - 149/75)  BP(mean): 81 (15 Sep 2023 17:25) (81 - 105)  ABP: --  ABP(mean): --  RR: 18 (15 Sep 2023 21:15) (16 - 18)  SpO2: 96% (15 Sep 2023 21:15) (93% - 99%)    O2 Parameters below as of 15 Sep 2023 21:15  Patient On (Oxygen Delivery Method): room air      Physical Exam:  General: NAD, resting comfortably in bed, A&O x2-3  Pulmonary: Nonlabored breathing, no respiratory distress  Cardiovascular: NSR  Abdominal: abdomen soft, mildly distended (unchanged), nt. No rebound or guarding. R flank FABIANO draining scant brown/white tinged fluid; LUQ drain producing bilious fluid Called to bedside by nurse for multiple episodes of dark, questionable coffee ground emesis? Started PPI BID, phenergan x1. EKG obtained, . Standing zofran. PCA noted patient appeared to be confused. Chief aware. Stat CBC, CMP, lytes, coags, T&S. NGT placed, 300cc of bilious emesis out. CXR/AXR stat for further evaluation/confirm NGT placement. Upon evaluation, patient is A&O x2-3, redirectable.    ICU Vital Signs Last 24 Hrs  T(C): 36.9 (15 Sep 2023 21:15), Max: 36.9 (15 Sep 2023 21:15)  T(F): 98.5 (15 Sep 2023 21:15), Max: 98.5 (15 Sep 2023 21:15)  HR: 73 (15 Sep 2023 21:15) (60 - 80)  BP: 126/67 (15 Sep 2023 21:15) (118/57 - 149/75)  BP(mean): 81 (15 Sep 2023 17:25) (81 - 105)  ABP: --  ABP(mean): --  RR: 18 (15 Sep 2023 21:15) (16 - 18)  SpO2: 96% (15 Sep 2023 21:15) (93% - 99%)    O2 Parameters below as of 15 Sep 2023 21:15  Patient On (Oxygen Delivery Method): room air    Physical Exam:  General: NAD, resting comfortably in bed, A&O x2-3  Pulmonary: Nonlabored breathing, no respiratory distress  Cardiovascular: NSR  Abdominal: abdomen soft, mildly distended (unchanged), nt. No rebound or guarding. R flank FABIANO draining scant brown/white tinged fluid; PTC draining bilious OP  Extremities: wwp, no calf tenderness or edema. SCDs in place     A/p: 68M with PMHx of HTN, GERD and recent diagnosis of stage IV pancreatic cancer with numerous liver metastasis who presents to [facility] from home for second opinion regarding treatmnet options. Patient reports he was in good health prior when he developed painless jaundice in the end of July with darkening urine and acholic stools. States he initially presented to [facility] and was diagnosed with stage IV adenocarcinoma. Reports he underwent ERCP with 2 biliary stents placed and was discharged to follow up with Dr. Ayala (oncologist at [facility]). Reports at presentation later in August to outpatient oncologist office, patient was noted to be hypotensive and febrile c/f cholangitis requiring hospital admission for posterior segment 6 liver abscess that was drained percutaneously (8/15). Reports additional endoscopy was performed that time and patient is unsure if stents were exchanged (per ERCP report, stents are 7Fr and occupy R anterior and posterior hepatic ducts). States he was admitted for 2-3 weeks and reports upon discharged he followed up with Dr. Goldberg on Thursday for second opinion. Patient reports today that he has been experiencing progressive fatigue and weakness, as well as anorexia 2/2 poor appetite. States he has become weak to the point where he cannot walk on his own and requires a cane. Now s/p PTC placement and drainage of 2.5L of clear, yellow ascites (9/12). Preoperatively, received 1 unit prbc, FFPx2, lasix x1. 500cc albumin given during case. After intubation, patient was hypotensive requiring John. Postoperatively, pt was extubated and VS wnl, off john. Patient admitted to SICU for further HD monitoring, stepped down. Now w/ persistent nausea/ bilious emesis, aspiration risk; stepped up to telemetry for further HD monitoring.    F/u labs  F/u AXR, F/u CXR  Transfer to telemetry   NPO/IVF  NGT to LIWS  Pain/nausea control PRN  Miralax bowel reg  IV meropenem (9/15-) and daptomycin (9/15-)  Coreg 6.25mg bid  PPI BID  SCDs/IS/OOB  R posterior IR drain (hepatic abscess)  Anterior PTC drain

## 2023-09-16 NOTE — BH CONSULTATION LIAISON ASSESSMENT NOTE - HPI (INCLUDE ILLNESS QUALITY, SEVERITY, DURATION, TIMING, CONTEXT, MODIFYING FACTORS, ASSOCIATED SIGNS AND SYMPTOMS)
69 yo male, with PPH of "depression" 15-20 years prior, now with PMH HTN, GERD and recent diagnosis of stage IV pancreatic cancer with numerous liver metastasis, presented to [facility] and diagnosed with stage IV adenocarcinoma, s/p liver abscess drainage x 6 8/15, s/p ERCp with two stent placement as per records, getting paracentesis with possibly PCT drain placement, history of klebsiella bacteremia from outpatient basis. Psychiatry consulted to evaluate for depression and provide recommendations.     The patient would frequently vomit throughout the assessment. He reports not sleeping since yesterday morning, he reports low energy, anhedonia, difficulty concentrating, no appetite, and heavy limbs with an unstable mood related to his medical problems that started about 2 weeks prior. He denies any history of bipolar illness or symptoms of luis angel in his past. He reports a family history of "mood problems" in various family members including his mother that involved them seeing psychiatrists. He expressed hope that with treatment he'll be able to feel better. He reports SI as "jest" stating that he doesn't want to kill himself, he just wants to "get some sleep" and to get past his current setting in the hospital. He denies any psychotic illness at this time. He was agreeable to a trial of mirtazapine, risk factors involving serotonin syndrome were discussed with the patient taking prn zofran. He expressed understanding and requested the trial with follow up from psychiatry.

## 2023-09-16 NOTE — BH CONSULTATION LIAISON ASSESSMENT NOTE - NSBHCHARTREVIEWVS_PSY_A_CORE FT
Vital Signs Last 24 Hrs  T(C): 36.9 (15 Sep 2023 21:15), Max: 36.9 (15 Sep 2023 21:15)  T(F): 98.5 (15 Sep 2023 21:15), Max: 98.5 (15 Sep 2023 21:15)  HR: 73 (15 Sep 2023 21:15) (60 - 80)  BP: 126/67 (15 Sep 2023 21:15) (118/57 - 149/75)  BP(mean): 81 (15 Sep 2023 17:25) (81 - 105)  RR: 18 (15 Sep 2023 21:15) (16 - 18)  SpO2: 96% (15 Sep 2023 21:15) (93% - 99%)    Parameters below as of 15 Sep 2023 21:15  Patient On (Oxygen Delivery Method): room air

## 2023-09-16 NOTE — BH CONSULTATION LIAISON PROGRESS NOTE - NSBHATTESTBILLING_PSY_A_CORE
99221-Initial OBS or IP - low complexity OR 40-54 mins 71403-[id number] OBS or IP - low complexity OR 25-34 mins

## 2023-09-16 NOTE — PROGRESS NOTE ADULT - ASSESSMENT
68M with PMHx of HTN, GERD and recent diagnosis of stage IV pancreatic cancer with numerous liver metastasis who presents to [facility] from home for second opinion regarding treatmnet options and progressive fatigue and weakness, as well as anorexia 2/2 poor appetite. Now s/p PTC placement and drainage of 2.5L of clear, yellow ascites (9/12).     Tele  NPO/IVF  Pain/nausea control PRN  Miralax bowel reg  IV meropenem (9/15-) and daptomycin (9/15-)  Coreg 6.25mg bid  PPI BID  SCDs/IS/OOB  R posterior IR drain (hepatic abscess)  Anterior PTC drain  Dispo: MARIE

## 2023-09-16 NOTE — BH CONSULTATION LIAISON ASSESSMENT NOTE - CURRENT MEDICATION
MEDICATIONS  (STANDING):  bisacodyl Suppository 10 milliGRAM(s) Rectal every 24 hours  carvedilol 6.25 milliGRAM(s) Oral every 12 hours  chlorhexidine 2% Cloths 1 Application(s) Topical <User Schedule>  DAPTOmycin IVPB 800 milliGRAM(s) IV Intermittent every 24 hours  dextrose 5% + sodium chloride 0.9%. 1000 milliLiter(s) (120 mL/Hr) IV Continuous <Continuous>  dextrose 5%. 1000 milliLiter(s) (100 mL/Hr) IV Continuous <Continuous>  dextrose 5%. 1000 milliLiter(s) (50 mL/Hr) IV Continuous <Continuous>  dextrose 50% Injectable 25 Gram(s) IV Push once  dextrose 50% Injectable 12.5 Gram(s) IV Push once  dextrose 50% Injectable 25 Gram(s) IV Push once  enoxaparin Injectable 40 milliGRAM(s) SubCutaneous every 24 hours  glucagon  Injectable 1 milliGRAM(s) IntraMuscular once  influenza  Vaccine (HIGH DOSE) 0.7 milliLiter(s) IntraMuscular once  insulin lispro (ADMELOG) corrective regimen sliding scale   SubCutaneous at bedtime  insulin lispro (ADMELOG) corrective regimen sliding scale   SubCutaneous three times a day before meals  melatonin 5 milliGRAM(s) Oral at bedtime  meropenem  IVPB 1000 milliGRAM(s) IV Intermittent every 8 hours  mirtazapine 7.5 milliGRAM(s) Oral at bedtime  ondansetron Injectable 8 milliGRAM(s) IV Push every 6 hours  pantoprazole  Injectable 40 milliGRAM(s) IV Push every 12 hours  polyethylene glycol 3350 17 Gram(s) Oral daily  sodium chloride 1 Gram(s) Oral two times a day  sucralfate suspension 1 Gram(s) Oral every 6 hours  urea Oral Powder 15 Gram(s) Oral daily    MEDICATIONS  (PRN):  dextrose Oral Gel 15 Gram(s) Oral once PRN Blood Glucose LESS THAN 70 milliGRAM(s)/deciliter  HYDROmorphone   Tablet 2 milliGRAM(s) Oral every 3 hours PRN Moderate Pain (4 - 6)

## 2023-09-16 NOTE — BH CONSULTATION LIAISON ASSESSMENT NOTE - SUMMARY
69 yo male, with PPH of "depression" 15-20 years prior, now with PMH HTN, GERD and recent diagnosis of stage IV pancreatic cancer with numerous liver metastasis, presented to [facility] and diagnosed with stage IV adenocarcinoma, s/p liver abscess drainage x 6 8/15, s/p ERCp with two stent placement as per records, getting paracentesis with possibly PCT drain placement, history of klebsiella bacteremia from outpatient basis. Psychiatry consulted to evaluate for depression and provide recommendations.     On assessment, the patient meets criteria other specified depressive disorder vs depressive disorder due to a known medical condition. He reports his primary complaints as difficulty sleeping, poor appetite, and lack of motivation. He denies any symptoms concerning for bipolar illness or psychotic illness. He denies current substance use. He denies any SI or HI at this time. He is agreeable to a trial of mirtazapine 7.5 mg nightly with follow up from psychiatry.     Plan:  - start mirtazapine 7.5 mg nightly  - no psychiatric contraindications for discharge  - psychiatry to follow

## 2023-09-17 NOTE — PROGRESS NOTE ADULT - SUBJECTIVE AND OBJECTIVE BOX
POST-OP DAY: X s/p     ON: adv CLD, added fluconazole 400 mg IV today, 200 mg qd starting tomorrow per ID     SUBJECTIVE: Patient seen and examined bedside by Surgical resident. Patient resting comfortably in bed with no acute complaints. Denies nausea. +F/+BM.    carvedilol 6.25 milliGRAM(s) Oral every 12 hours  DAPTOmycin IVPB 800 milliGRAM(s) IV Intermittent every 24 hours  fluconAZOLE IVPB 200 milliGRAM(s) IV Intermittent every 24 hours  meropenem  IVPB 1000 milliGRAM(s) IV Intermittent every 8 hours  urea Oral Powder 15 Gram(s) Oral daily    MEDICATIONS  (PRN):  dextrose Oral Gel 15 Gram(s) Oral once PRN Blood Glucose LESS THAN 70 milliGRAM(s)/deciliter  HYDROmorphone   Tablet 2 milliGRAM(s) Oral every 3 hours PRN Moderate Pain (4 - 6)  ondansetron Injectable 8 milliGRAM(s) IV Push every 6 hours PRN Nausea and/or Vomiting      I&O's Detail    16 Sep 2023 07:01  -  17 Sep 2023 07:00  --------------------------------------------------------  IN:    dextrose 5% + sodium chloride 0.45%: 280 mL    dextrose 5% + sodium chloride 0.45%: 400 mL    dextrose 5% + sodium chloride 0.9%: 600 mL    IV PiggyBack: 537.5 mL  Total IN: 1817.5 mL    OUT:    Drain (mL): 50 mL    Drain (mL): 0 mL    Voided (mL): 800 mL  Total OUT: 850 mL    Total NET: 967.5 mL          Vital Signs Last 24 Hrs  T(C): 36.5 (17 Sep 2023 05:18), Max: 37 (16 Sep 2023 14:00)  T(F): 97.7 (17 Sep 2023 05:18), Max: 98.6 (16 Sep 2023 14:00)  HR: 68 (17 Sep 2023 01:14) (68 - 74)  BP: 147/67 (17 Sep 2023 01:14) (133/61 - 155/77)  BP(mean): 96 (17 Sep 2023 01:14) (88 - 107)  RR: 18 (17 Sep 2023 01:14) (18 - 18)  SpO2: 97% (17 Sep 2023 01:14) (94% - 98%)    Parameters below as of 17 Sep 2023 01:14  Patient On (Oxygen Delivery Method): room air        General: NAD, resting comfortably in bed, jaundice at base line   C/V: NSR  Pulm: Nonlabored breathing, no respiratory distress  Abd: soft, NT/ND, PTC drain bilious, RJP serous  Extrem: WWP, no edema, SCDs in place    LABS:                        9.5    13.47 )-----------( 152      ( 17 Sep 2023 07:00 )             29.6     09-17    138  |  106  |  22  ----------------------------<  115<H>  3.7   |  21<L>  |  1.09    Ca    8.0<L>      17 Sep 2023 07:00  Phos  3.0     09-17  Mg     2.2     09-17    TPro  5.8<L>  /  Alb  1.7<L>  /  TBili  11.5<H>  /  DBili  8.5<H>  /  AST  71<H>  /  ALT  30  /  AlkPhos  327<H>  09-17    PT/INR - ( 16 Sep 2023 05:30 )   PT: 16.8 sec;   INR: 1.49          PTT - ( 16 Sep 2023 05:30 )  PTT:37.1 sec  Urinalysis Basic - ( 17 Sep 2023 07:00 )    Color: x / Appearance: x / SG: x / pH: x  Gluc: 115 mg/dL / Ketone: x  / Bili: x / Urobili: x   Blood: x / Protein: x / Nitrite: x   Leuk Esterase: x / RBC: x / WBC x   Sq Epi: x / Non Sq Epi: x / Bacteria: x        RADIOLOGY & ADDITIONAL STUDIES:     POST-OP DAY: 5 s/p PTC placement and drainage of 2.5L of clear, yellow ascites (9/12)    ON: adv CLD, added fluconazole 400 mg IV today, 200 mg qd starting tomorrow per ID     SUBJECTIVE: Patient seen and examined bedside by Surgical resident. Patient resting comfortably in bed with no acute complaints. Denies nausea. +F/+BM.    carvedilol 6.25 milliGRAM(s) Oral every 12 hours  DAPTOmycin IVPB 800 milliGRAM(s) IV Intermittent every 24 hours  fluconAZOLE IVPB 200 milliGRAM(s) IV Intermittent every 24 hours  meropenem  IVPB 1000 milliGRAM(s) IV Intermittent every 8 hours  urea Oral Powder 15 Gram(s) Oral daily    MEDICATIONS  (PRN):  dextrose Oral Gel 15 Gram(s) Oral once PRN Blood Glucose LESS THAN 70 milliGRAM(s)/deciliter  HYDROmorphone   Tablet 2 milliGRAM(s) Oral every 3 hours PRN Moderate Pain (4 - 6)  ondansetron Injectable 8 milliGRAM(s) IV Push every 6 hours PRN Nausea and/or Vomiting      I&O's Detail    16 Sep 2023 07:01  -  17 Sep 2023 07:00  --------------------------------------------------------  IN:    dextrose 5% + sodium chloride 0.45%: 280 mL    dextrose 5% + sodium chloride 0.45%: 400 mL    dextrose 5% + sodium chloride 0.9%: 600 mL    IV PiggyBack: 537.5 mL  Total IN: 1817.5 mL    OUT:    Drain (mL): 50 mL    Drain (mL): 0 mL    Voided (mL): 800 mL  Total OUT: 850 mL    Total NET: 967.5 mL          Vital Signs Last 24 Hrs  T(C): 36.5 (17 Sep 2023 05:18), Max: 37 (16 Sep 2023 14:00)  T(F): 97.7 (17 Sep 2023 05:18), Max: 98.6 (16 Sep 2023 14:00)  HR: 68 (17 Sep 2023 01:14) (68 - 74)  BP: 147/67 (17 Sep 2023 01:14) (133/61 - 155/77)  BP(mean): 96 (17 Sep 2023 01:14) (88 - 107)  RR: 18 (17 Sep 2023 01:14) (18 - 18)  SpO2: 97% (17 Sep 2023 01:14) (94% - 98%)    Parameters below as of 17 Sep 2023 01:14  Patient On (Oxygen Delivery Method): room air        General: NAD, resting comfortably in bed, jaundice at base line   C/V: NSR  Pulm: Nonlabored breathing, no respiratory distress  Abd: soft, NT/ND, PTC drain bilious, RJP serous  Extrem: WWP, no edema, SCDs in place    LABS:                        9.5    13.47 )-----------( 152      ( 17 Sep 2023 07:00 )             29.6     09-17    138  |  106  |  22  ----------------------------<  115<H>  3.7   |  21<L>  |  1.09    Ca    8.0<L>      17 Sep 2023 07:00  Phos  3.0     09-17  Mg     2.2     09-17    TPro  5.8<L>  /  Alb  1.7<L>  /  TBili  11.5<H>  /  DBili  8.5<H>  /  AST  71<H>  /  ALT  30  /  AlkPhos  327<H>  09-17    PT/INR - ( 16 Sep 2023 05:30 )   PT: 16.8 sec;   INR: 1.49          PTT - ( 16 Sep 2023 05:30 )  PTT:37.1 sec  Urinalysis Basic - ( 17 Sep 2023 07:00 )    Color: x / Appearance: x / SG: x / pH: x  Gluc: 115 mg/dL / Ketone: x  / Bili: x / Urobili: x   Blood: x / Protein: x / Nitrite: x   Leuk Esterase: x / RBC: x / WBC x   Sq Epi: x / Non Sq Epi: x / Bacteria: x        RADIOLOGY & ADDITIONAL STUDIES:

## 2023-09-17 NOTE — PROGRESS NOTE ADULT - ASSESSMENT
68M with PMHx of HTN, GERD and recent diagnosis of stage IV pancreatic cancer with numerous liver metastasis who presents to [facility] from home for second opinion regarding treatmnet options and progressive fatigue and weakness, as well as anorexia 2/2 poor appetite. Now s/p PTC placement and drainage of 2.5L of clear, yellow ascites (9/12).     Tele  CLD/IVF  Pain/nausea control PRN  Miralax bowel reg  IV meropenem (9/15-) and daptomycin (9/15-) fluconazole (9/16-)  Coreg 6.25mg bid  PPI BID  SCDs/IS/OOB  R posterior IR drain (hepatic abscess)  Anterior PTC drain  Dispo: MARIE

## 2023-09-17 NOTE — PROGRESS NOTE ADULT - ASSESSMENT
Patient is a 68M with PMHx of HTN, GERD and recent diagnosis of stage IV pancreatic cancer with numerous liver metastasis presented to [facility] and diagnosed with stage IV adenocarcinoma, s/p liver abscess drainage x 6 8/15, s/p ERCp with two stent placement as per records, getting paracentesis with possibly PCT drain placement, history of klebsiella bacteremia from outpatient basis , patient pending upgrade to ICU for monitoring, biliary drain placed, 2.5 L fluid drained which was clear and yellow, hypotensive during procedure, 2 FFPs given pre-op,   biliary tree stents visualized and in place    Mild euvolemic hyponatremia which did not auto correcting due to poor oral intake and liver disease - resolved on BID Salt tablets, Urea daily; Isotonic fluids can be continued for nutrition status, if poor nutrition continues recommend dietitian f/u and alternative forms of nutrition ( parenteral / NGT )   Hypoalbuminemia can lead to worsening third spacing, would recommend IV albumin infusion once   PRN FFPS as needed pre op for elevated coag levels   IR s/p paracentesis with PTC drain placement, bilirubin continues to be elevated  acute kidney injury on CKD - resolved   peritoneal cultures / blood culture - NGTD   Bile culture - growing preliminarily VRE, klebsiella, citrobacter  ID evaluated patient, zosyn adjusted to merrem / dapto, also gave reccs for PO meds for discharge- quinolone / zyvox     IVF diet as per surgery   normocytic anemia s/p PRBCs transfusions, HB stable   hemodynamics are stable after PRBC / fluid resuscitation - monitor for orthostasis   dvt ppx as per surgery   HTN- continue on coreg

## 2023-09-17 NOTE — PROGRESS NOTE ADULT - SUBJECTIVE AND OBJECTIVE BOX
O/N Events:    Subjective/ROS: Patient seen and examined at bedside   much more alert today   skin is less jaundiced, PCT drain in place, IR drain in place   abdominal pain improved    12pt ROS otherwise negative.        PHYSICAL EXAM  General: NAD, skin is jaundiced   Head: NC/AT; MMM; PERRL; EOMI;  Neck: Supple; no JVD  Respiratory: CTAB; no wheezes/rales/rhonchi  Cardiovascular: Regular rhythm/rate; S1/S2+, no murmurs, rubs gallops   Gastrointestinal: Soft;NT. mild distension, PCT drain in place draining biliary fluid bowel sounds normal and present  Extremities: WWP; no edema/cyanosis  Neurological: A&Ox3, CNII-XII grossly intact; no obvious focal deficits        Vital Signs Last 24 Hrs  T(C): 36.5 (17 Sep 2023 05:18), Max: 37 (16 Sep 2023 14:00)  T(F): 97.7 (17 Sep 2023 05:18), Max: 98.6 (16 Sep 2023 14:00)  HR: 74 (17 Sep 2023 08:50) (64 - 74)  BP: 164/72 (17 Sep 2023 08:50) (133/61 - 164/72)  BP(mean): 104 (17 Sep 2023 08:50) (88 - 107)  RR: 19 (17 Sep 2023 08:50) (18 - 19)  SpO2: 97% (17 Sep 2023 08:50) (96% - 98%)    Parameters below as of 17 Sep 2023 08:50  Patient On (Oxygen Delivery Method): room air    MEDICATIONS  (STANDING):  bisacodyl Suppository 10 milliGRAM(s) Rectal every 24 hours  carvedilol 6.25 milliGRAM(s) Oral every 12 hours  chlorhexidine 2% Cloths 1 Application(s) Topical <User Schedule>  DAPTOmycin IVPB 800 milliGRAM(s) IV Intermittent every 24 hours  dextrose 5% + sodium chloride 0.45%. 1000 milliLiter(s) (40 mL/Hr) IV Continuous <Continuous>  dextrose 5%. 1000 milliLiter(s) (50 mL/Hr) IV Continuous <Continuous>  dextrose 5%. 1000 milliLiter(s) (100 mL/Hr) IV Continuous <Continuous>  dextrose 50% Injectable 25 Gram(s) IV Push once  dextrose 50% Injectable 25 Gram(s) IV Push once  dextrose 50% Injectable 12.5 Gram(s) IV Push once  fluconAZOLE IVPB 200 milliGRAM(s) IV Intermittent every 24 hours  glucagon  Injectable 1 milliGRAM(s) IntraMuscular once  influenza  Vaccine (HIGH DOSE) 0.7 milliLiter(s) IntraMuscular once  insulin lispro (ADMELOG) corrective regimen sliding scale   SubCutaneous three times a day before meals  insulin lispro (ADMELOG) corrective regimen sliding scale   SubCutaneous at bedtime  melatonin 5 milliGRAM(s) Oral at bedtime  meropenem  IVPB 1000 milliGRAM(s) IV Intermittent every 8 hours  mirtazapine 7.5 milliGRAM(s) Oral at bedtime  pantoprazole  Injectable 40 milliGRAM(s) IV Push every 12 hours  polyethylene glycol 3350 17 Gram(s) Oral daily  potassium chloride   Powder 20 milliEquivalent(s) Oral once  sodium chloride 1 Gram(s) Oral two times a day  sucralfate suspension 1 Gram(s) Oral every 6 hours  urea Oral Powder 15 Gram(s) Oral daily    MEDICATIONS  (PRN):  dextrose Oral Gel 15 Gram(s) Oral once PRN Blood Glucose LESS THAN 70 milliGRAM(s)/deciliter  HYDROmorphone   Tablet 2 milliGRAM(s) Oral every 3 hours PRN Moderate Pain (4 - 6)  ondansetron Injectable 8 milliGRAM(s) IV Push every 6 hours PRN Nausea and/or Vomiting              No Known Allergies      LABS  MEDICATIONS  (STANDING):  bisacodyl Suppository 10 milliGRAM(s) Rectal every 24 hours  carvedilol 6.25 milliGRAM(s) Oral every 12 hours  chlorhexidine 2% Cloths 1 Application(s) Topical <User Schedule>  DAPTOmycin IVPB 800 milliGRAM(s) IV Intermittent every 24 hours  dextrose 5% + sodium chloride 0.9%. 1000 milliLiter(s) (120 mL/Hr) IV Continuous <Continuous>  dextrose 5%. 1000 milliLiter(s) (100 mL/Hr) IV Continuous <Continuous>  dextrose 5%. 1000 milliLiter(s) (50 mL/Hr) IV Continuous <Continuous>  dextrose 50% Injectable 25 Gram(s) IV Push once  dextrose 50% Injectable 25 Gram(s) IV Push once  dextrose 50% Injectable 12.5 Gram(s) IV Push once  glucagon  Injectable 1 milliGRAM(s) IntraMuscular once  influenza  Vaccine (HIGH DOSE) 0.7 milliLiter(s) IntraMuscular once  insulin lispro (ADMELOG) corrective regimen sliding scale   SubCutaneous at bedtime  insulin lispro (ADMELOG) corrective regimen sliding scale   SubCutaneous three times a day before meals  melatonin 5 milliGRAM(s) Oral at bedtime  meropenem  IVPB 1000 milliGRAM(s) IV Intermittent every 8 hours  mirtazapine 7.5 milliGRAM(s) Oral at bedtime  pantoprazole  Injectable 40 milliGRAM(s) IV Push every 12 hours  polyethylene glycol 3350 17 Gram(s) Oral daily  potassium chloride    Tablet ER 40 milliEquivalent(s) Oral once  sodium chloride 1 Gram(s) Oral two times a day  sucralfate suspension 1 Gram(s) Oral every 6 hours  urea Oral Powder 15 Gram(s) Oral daily    MEDICATIONS  (PRN):  dextrose Oral Gel 15 Gram(s) Oral once PRN Blood Glucose LESS THAN 70 milliGRAM(s)/deciliter  HYDROmorphone   Tablet 2 milliGRAM(s) Oral every 3 hours PRN Moderate Pain (4 - 6)  ondansetron Injectable 8 milliGRAM(s) IV Push every 6 hours PRN Nausea and/or Vomiting                        10.6   8.97  )-----------( 241      ( 13 Sep 2023 05:30 )             32.0     09-13    137  |  102  |  27<H>  ----------------------------<  166<H>  3.6   |  25  |  1.38<H>    Ca    8.4      13 Sep 2023 05:30  Phos  2.7     09-13  Mg     1.7     09-13    TPro  6.5  /  Alb  2.2<L>  /  TBili  14.0<H>  /  DBili  x   /  AST  105<H>  /  ALT  44  /  AlkPhos  536<H>  09-13    PT/INR - ( 13 Sep 2023 06:17 )   PT: 15.6 sec;   INR: 1.38          PTT - ( 13 Sep 2023 06:17 )  PTT:32.2 sec  Urinalysis Basic - ( 13 Sep 2023 05:30 )    Color: x / Appearance: x / SG: x / pH: x  Gluc: 166 mg/dL / Ketone: x  / Bili: x / Urobili: x   Blood: x / Protein: x / Nitrite: x   Leuk Esterase: x / RBC: x / WBC x   Sq Epi: x / Non Sq Epi: x / Bacteria: x            Culture - Body Fluid with Gram Stain (collected 09-12-23 @ 16:25)  Source: Peritoneal Peritoneal Fluid RLQ Abdomen  Gram Stain (09-12-23 @ 18:26):    No organisms seen    Rare WBC's  Preliminary Report (09-13-23 @ 09:05):    No growth to date    Culture - Body Fluid with Gram Stain (collected 09-12-23 @ 16:23)  Source: Bile biliary fluid  Gram Stain (09-12-23 @ 18:09):    No organisms seen    No WBC's seen.  Preliminary Report (09-13-23 @ 09:53):    Culture in progress        IMAGING/EKG/ETC

## 2023-09-18 NOTE — OCCUPATIONAL THERAPY INITIAL EVALUATION ADULT - GENERAL OBSERVATIONS, REHAB EVAL
PT Kiana present. Patient received semisupine in bed +tele, +FABIANO drain, +biliary drain, +IV, +b/l SCD's, room air, NAD. Patient A&Ox 2-3 agreeable and tolerated session fairly. PT Kiana present. Patient received semisupine in bed +tele, +FABIANO drain, +biliary drain, +IV, +b/l SCD's, room air, NAD. Patient A&Ox 2-3 agreeable and tolerated session fairly. Patient noted with slightly elevated 's at rest however asymptomatic, dropped to 170's when seated.

## 2023-09-18 NOTE — OCCUPATIONAL THERAPY INITIAL EVALUATION ADULT - TRANSFER SAFETY CONCERNS NOTED: SIT/STAND, REHAB EVAL
decreased balance during turns/decreased weight-shifting ability x 1 trial- limited by c/o of generalized weakness/decreased balance during turns/decreased weight-shifting ability

## 2023-09-18 NOTE — OCCUPATIONAL THERAPY INITIAL EVALUATION ADULT - PERTINENT HX OF CURRENT PROBLEM, REHAB EVAL
Patient is a 68M with PMHx of HTN, GERD and recent diagnosis of stage IV pancreatic cancer with numerous liver metastasis presented to [facility] and diagnosed with stage IV adenocarcinoma, s/p liver abscess drainage x 6 8/15, s/p ERCp with two stent placement as per records, getting paracentesis with possibly PCT drain placement, history of klebsiella bacteremia from outpatient basis , patient pending upgrade to ICU for monitoring, biliary drain placed, 2.5 L fluid drained which was clear and yellow, hypotensive during procedure, 2 FFPs given pre-op, biliary tree stents visualized and in place.

## 2023-09-18 NOTE — PROGRESS NOTE ADULT - ASSESSMENT
68M with PMHx of HTN, GERD and recent diagnosis of stage IV pancreatic cancer with numerous liver metastasis who presents to [facility] from home for second opinion regarding treatmnet options and progressive fatigue and weakness, as well as anorexia 2/2 poor appetite. Now s/p PTC placement and drainage of 2.5L of clear, yellow ascites (9/12).     Tele  CLD/IVF  Lovenox  Pain/nausea control PRN  Miralax bowel reg  IV meropenem (9/15-) and daptomycin (9/15-) fluconazole (9/16-)  Coreg 6.25mg bid  PPI BID  SCDs/IS/OOB  R posterior IR drain (hepatic abscess)  Anterior PTC drain  Reassess mental status   Dispo: MARIE

## 2023-09-18 NOTE — PROGRESS NOTE ADULT - ASSESSMENT
Patient is a 68M with PMHx of HTN, GERD and recent diagnosis of stage IV pancreatic cancer with numerous liver metastasis presented to [facility] and diagnosed with stage IV adenocarcinoma, s/p liver abscess drainage x 6 8/15, s/p ERCp with two stent placement as per records, getting paracentesis with possibly PCT drain placement, history of klebsiella bacteremia from outpatient basis , patient pending upgrade to ICU for monitoring, biliary drain placed, 2.5 L fluid drained which was clear and yellow, hypotensive during procedure, 2 FFPs given pre-op,   biliary tree stents visualized and in place      Mild euvolemic hyponatremia which did not auto correcting due to poor oral intake and liver disease - resolved on BID Salt tablets, Urea daily; Isotonic fluids can be continued for nutrition status, if poor nutrition continues recommend dietitian f/u and alternative forms of nutrition ( parenteral / NGT )   Hypoalbuminemia can lead to worsening third spacing, would recommend IV albumin infusion once   PRN FFPS as needed pre op for elevated coag levels   IR s/p paracentesis with PTC drain placement, bilirubin continues to be elevated  acute kidney injury on CKD - resolved   peritoneal cultures / blood culture - NGTD   Bile culture -VRE, klebsiella, citrobacter, candida   ID evaluated - merrem / dapto, also gave reccs for PO meds for discharge- quinolone / zyvox     IVF diet as per surgery   normocytic anemia s/p PRBCs transfusions, HB stable   hemodynamics are stable after PRBC / fluid resuscitation - monitor for orthostasis   dvt ppx as per surgery   HTN- continue on coreg, BP remained uncontrolled added on norvasc 5 mg daily with holding parameters

## 2023-09-18 NOTE — OCCUPATIONAL THERAPY INITIAL EVALUATION ADULT - ADDITIONAL COMMENTS
Patient reports living with his wife in a private home with 4 MIKHAIL and 1 FOS to the bedroom/bathroom. Patient states he was independent up until the past couple of weeks before admission when he began to feel weaker requiring assistance for mobility and ADL's. Patient is L hand dominant and wears glasses for distance. Patient has a walk in shower with shower chair/grab bars. Patient also has hospital bed, commode, and raised toilet seat.

## 2023-09-18 NOTE — PROGRESS NOTE ADULT - SUBJECTIVE AND OBJECTIVE BOX
INTERVAL HPI/OVERNIGHT EVENTS: JO    STATUS POST:  PTC placement with drainage of 2.5L clear, yellow ascites    POST OPERATIVE DAY #: 6    SUBJECTIVE: Patient seen and examined at bedside with chief resident. Patient found resting comfortably in bed with no acute complaints. He denies PO intake without nausea or vomiting.       carvedilol 6.25 milliGRAM(s) Oral every 12 hours  DAPTOmycin IVPB 800 milliGRAM(s) IV Intermittent every 24 hours  enoxaparin Injectable 40 milliGRAM(s) SubCutaneous every 24 hours  fluconAZOLE IVPB 200 milliGRAM(s) IV Intermittent every 24 hours  meropenem  IVPB 1000 milliGRAM(s) IV Intermittent every 8 hours  urea Oral Powder 15 Gram(s) Oral daily      Vital Signs Last 24 Hrs  T(C): 36.6 (18 Sep 2023 04:26), Max: 36.9 (17 Sep 2023 14:33)  T(F): 97.9 (18 Sep 2023 04:26), Max: 98.5 (17 Sep 2023 14:33)  HR: 72 (18 Sep 2023 04:41) (66 - 74)  BP: 160/82 (18 Sep 2023 04:41) (144/71 - 164/72)  BP(mean): 114 (18 Sep 2023 04:41) (101 - 114)  RR: 18 (18 Sep 2023 04:41) (16 - 19)  SpO2: 95% (18 Sep 2023 04:41) (95% - 99%)    Parameters below as of 18 Sep 2023 04:41  Patient On (Oxygen Delivery Method): room air      I&O's Detail    17 Sep 2023 07:01  -  18 Sep 2023 07:00  --------------------------------------------------------  IN:    dextrose 5% + sodium chloride 0.45%: 960 mL    Oral Fluid: 350 mL  Total IN: 1310 mL    OUT:    Drain (mL): 45 mL    Drain (mL): 350 mL    Voided (mL): 1100 mL  Total OUT: 1495 mL    Total NET: -185 mL      18 Sep 2023 07:01  -  18 Sep 2023 07:46  --------------------------------------------------------  IN:    dextrose 5% + sodium chloride 0.45%: 40 mL  Total IN: 40 mL    OUT:  Total OUT: 0 mL    Total NET: 40 mL          General: AOx 2-3, NAD, resting comfortably in bed  C/V: NSR  Pulm: Nonlabored breathing, no respiratory distress  Abd: soft, NT/ND.  Extrem: WWP, no edema, SCDs in place  Drains: R posterior IR FABIANO - green/brown fluid; PTC: bilious    LABS:                        9.6    14.92 )-----------( 151      ( 18 Sep 2023 05:48 )             30.6     09-18    135  |  106  |  25<H>  ----------------------------<  118<H>  4.1   |  21<L>  |  1.08    Ca    8.0<L>      18 Sep 2023 05:48  Phos  3.1     09-18  Mg     2.0     09-18    TPro  5.8<L>  /  Alb  1.7<L>  /  TBili  11.5<H>  /  DBili  8.5<H>  /  AST  71<H>  /  ALT  30  /  AlkPhos  327<H>  09-17      Urinalysis Basic - ( 18 Sep 2023 05:48 )    Color: x / Appearance: x / SG: x / pH: x  Gluc: 118 mg/dL / Ketone: x  / Bili: x / Urobili: x   Blood: x / Protein: x / Nitrite: x   Leuk Esterase: x / RBC: x / WBC x   Sq Epi: x / Non Sq Epi: x / Bacteria: x        RADIOLOGY & ADDITIONAL STUDIES:

## 2023-09-18 NOTE — PROGRESS NOTE ADULT - SUBJECTIVE AND OBJECTIVE BOX
Infectious Disease Attending     Antibiotics  Fluconazole 200 mg IV daily (day #2)   Daptomycin 800 mg IV daily (day #5)   Meropenem 1000 mg IV three times daily (day #5)     Seen and evaluated; afebrile amidst improved sensorium and   reduction in abdominal pain      Physical Examination    Vital Signs: T 97.7 F /75 Sat 98% RA  HEENT: Sclerae icteric  Respiratory: Diminished breath sounds at bases   Abdominal: Soft + tenderness to palpation + drains in place   Extremities: No edema   Dermatologic: Jaundiced    Laboratory Data   Leukocytes 14.9  Platelets 151  Creatinine 1.0  Direct bilirubin 8.2  Alkaline phosphatase 318    Microbiology   Bile culture (9/12) Citrobacter freundii                            Klebsiella pneumoniae (ESBL)                             Enterococcus faecalis                             Enterococcus faecium (VRE)                             Candida tropicalis   Blood cultures (9/11) no growth x 5 days

## 2023-09-18 NOTE — PROGRESS NOTE ADULT - ASSESSMENT
Impression     History of pancreatic adenocarcinoma complicated by extensive bilobar hepatic   metastatic disease status post biliary stenting x 2 presents with MDR hepatic   subcapsular abscess status post PTC drain - cultures positive for ESBL Klebsiella  Klebsiella pneumoniae as well as Enterococcus faecium (VRE)     Plan    1. Continue meropenem 1000 mg IV three times daily   2. Continue daptomycin 800 mg IV daily - check Cpk  3. Continue fluconazole 200 mg IV daily   4. Continue PTC and IR drains   5. Anticipate ultimate discharge on highly bioavailable oral antibiotic regimen   with drains and pain management - linezolid 600 mg by mouth twice daily   and moxifloxacin 400 mg by mouth daily x 14 days     Thank you kindly for this referral.     Alex Umanzor MD  [phone]

## 2023-09-18 NOTE — PROGRESS NOTE ADULT - SUBJECTIVE AND OBJECTIVE BOX
O/N Events:    Subjective/ROS: Patient seen and examined at bedside   more alert, skin is less jaundiced, PCT drain in place, IR drain in place   abdominal pain improved  on D5 due to poor PO intake     12pt ROS otherwise negative.        PHYSICAL EXAM  General: NAD, skin is jaundiced   Head: NC/AT; MMM; PERRL; EOMI;  Neck: Supple; no JVD  Respiratory: CTAB; no wheezes/rales/rhonchi  Cardiovascular: Regular rhythm/rate; S1/S2+, no murmurs, rubs gallops   Gastrointestinal: Soft;NT. mild distension, PCT drain in place draining biliary fluid bowel sounds normal and present  Extremities: WWP; no edema/cyanosis  Neurological: A&Ox3, CNII-XII grossly intact; no obvious focal deficits        Vital Signs Last 24 Hrs  T(C): 36.5 (18 Sep 2023 09:48), Max: 36.9 (17 Sep 2023 14:33)  T(F): 97.7 (18 Sep 2023 09:48), Max: 98.5 (17 Sep 2023 14:33)  HR: 70 (18 Sep 2023 09:15) (66 - 72)  BP: 169/80 (18 Sep 2023 09:15) (144/71 - 169/80)  BP(mean): 115 (18 Sep 2023 09:15) (101 - 115)  RR: 17 (18 Sep 2023 09:15) (16 - 18)  SpO2: 99% (18 Sep 2023 09:15) (95% - 99%)    Parameters below as of 18 Sep 2023 09:15  Patient On (Oxygen Delivery Method): room air    MEDICATIONS  (STANDING):  bisacodyl Suppository 10 milliGRAM(s) Rectal every 24 hours  carvedilol 6.25 milliGRAM(s) Oral every 12 hours  chlorhexidine 2% Cloths 1 Application(s) Topical <User Schedule>  DAPTOmycin IVPB 800 milliGRAM(s) IV Intermittent every 24 hours  dextrose 5% + sodium chloride 0.45%. 1000 milliLiter(s) (40 mL/Hr) IV Continuous <Continuous>  dextrose 5%. 1000 milliLiter(s) (100 mL/Hr) IV Continuous <Continuous>  dextrose 5%. 1000 milliLiter(s) (50 mL/Hr) IV Continuous <Continuous>  dextrose 50% Injectable 25 Gram(s) IV Push once  dextrose 50% Injectable 12.5 Gram(s) IV Push once  dextrose 50% Injectable 25 Gram(s) IV Push once  enoxaparin Injectable 40 milliGRAM(s) SubCutaneous every 24 hours  fluconAZOLE IVPB 200 milliGRAM(s) IV Intermittent every 24 hours  glucagon  Injectable 1 milliGRAM(s) IntraMuscular once  influenza  Vaccine (HIGH DOSE) 0.7 milliLiter(s) IntraMuscular once  insulin lispro (ADMELOG) corrective regimen sliding scale   SubCutaneous at bedtime  insulin lispro (ADMELOG) corrective regimen sliding scale   SubCutaneous three times a day before meals  melatonin 5 milliGRAM(s) Oral at bedtime  meropenem  IVPB 1000 milliGRAM(s) IV Intermittent every 8 hours  mirtazapine 7.5 milliGRAM(s) Oral at bedtime  pantoprazole  Injectable 40 milliGRAM(s) IV Push every 12 hours  polyethylene glycol 3350 17 Gram(s) Oral daily  sodium chloride 1 Gram(s) Oral two times a day  sucralfate suspension 1 Gram(s) Oral every 6 hours  urea Oral Powder 15 Gram(s) Oral daily    MEDICATIONS  (PRN):  dextrose Oral Gel 15 Gram(s) Oral once PRN Blood Glucose LESS THAN 70 milliGRAM(s)/deciliter  ondansetron Injectable 8 milliGRAM(s) IV Push every 6 hours PRN Nausea and/or Vomiting      8.97  )-----------( 241      ( 13 Sep 2023 05:30 )             32.0     09-13    137  |  102  |  27<H>  ----------------------------<  166<H>  3.6   |  25  |  1.38<H>    Ca    8.4      13 Sep 2023 05:30  Phos  2.7     09-13  Mg     1.7     09-13    TPro  6.5  /  Alb  2.2<L>  /  TBili  14.0<H>  /  DBili  x   /  AST  105<H>  /  ALT  44  /  AlkPhos  536<H>  09-13    PT/INR - ( 13 Sep 2023 06:17 )   PT: 15.6 sec;   INR: 1.38

## 2023-09-18 NOTE — OCCUPATIONAL THERAPY INITIAL EVALUATION ADULT - DIAGNOSIS, OT EVAL
Patient brought to [facility] POD #6 s/p percutaneous drainage of ascites and replacement of percutaneous transhepatic drainage catheter, presents with decreased overall strength, balance, postural control and activity tolerance impacting independence with functional activities and mobility.

## 2023-09-18 NOTE — OCCUPATIONAL THERAPY INITIAL EVALUATION ADULT - MODIFIED CLINICAL TEST OF SENSORY INTEGRATION IN BALANCE TEST
Patient functionally side stepped to the L x 2 with Mod A x 2 persons and b/l hand held assist and trunk support. Patient noted with decreased step length, weight shifting, narrow base of support requiring min verbal/tactile cues throughout for proper positioning, postural control and safety.

## 2023-09-19 NOTE — ADVANCED PRACTICE NURSE CONSULT - ASSESSMENT
Patient alert but less attentive today. Weaker with turns.  Incontinent. Allevyn foam in place and moist.     Gluteal cleft, moisture associated skin damage: continued linear partial thickness denuded area within the cleft. Surrounding blanchable redness. Sacrococcygeal intact.    Heels intact. Patient alert but less attentive today. Weaker with turns.  Allevyn foam in place and moist (perspiration). Rich 13.    Gluteal cleft, moisture associated skin damage: continued linear partial thickness denuded area within the cleft. Surrounding blanchable redness. Sacrococcygeal intact.    Heels intact. Patient alert but less attentive today. Weaker with turns.  Allevyn foam in place and moist (perspiration). Rich 13.    Gluteal cleft, moisture associated skin damage: continued linear partial thickness denuded area within the cleft. Surrounding dry flaky blanchable redness. Sacrococcygeal intact.    Heels intact.

## 2023-09-19 NOTE — ADVANCED PRACTICE NURSE CONSULT - RECOMMEDATIONS
Gluteal cleft: cleanse with bath wipes. Apply zinc based Moisture barrier cream. Reapply daily and prn cleansing. Do NOT cover.     Waffle cushion when in the chair.   Continue repositioning for pressure injury prevention. Pt is now weaker and less able to self reposition  Elevate heels off of bed.    Discussed with primary RN     Please reconsult if wound worsens or new wounds develop.

## 2023-09-19 NOTE — PROGRESS NOTE ADULT - ASSESSMENT
68M with PMHx of HTN, GERD and recent diagnosis of stage IV pancreatic cancer with numerous liver metastasis who presents to [facility] from home for second opinion regarding treatmnet options and progressive fatigue and weakness, as well as anorexia 2/2 poor appetite. Now s/p PTC placement and drainage of 2.5L of clear, yellow ascites (9/12).     CLD/IVF  Pain/nausea control PRN  Miralax bowel reg  IV meropenem (9/15-) and daptomycin (9/15-) fluconazole (9/16-)  Coreg 6.25mg bid  PPI BID  SCDs/IS/OOB  R posterior IR drain (hepatic abscess)  Anterior PTC drain  Dispo: MARIE

## 2023-09-19 NOTE — PROGRESS NOTE ADULT - SUBJECTIVE AND OBJECTIVE BOX
INTERVAL HPI/OVERNIGHT EVENTS: saturnino CLD. NPO @[place]. becoming delirius with visual hallucinations ? able to be reoriented (A&Ox3).     STATUS POST: 9/12: IR PTC and paracenthesis(2.5L)    SUBJECTIVE: Patient seen and examined bedside this morning during AM rounds. He was confused as to where he was, but able to be reoriented. Denies much PO intake. He has no acute complaints.     MEDICATIONS  (STANDING):  amLODIPine   Tablet 5 milliGRAM(s) Oral daily  bisacodyl Suppository 10 milliGRAM(s) Rectal every 24 hours  carvedilol 6.25 milliGRAM(s) Oral every 12 hours  chlorhexidine 2% Cloths 1 Application(s) Topical <User Schedule>  DAPTOmycin IVPB 800 milliGRAM(s) IV Intermittent every 24 hours  dextrose 5% + sodium chloride 0.45%. 1000 milliLiter(s) (120 mL/Hr) IV Continuous <Continuous>  dextrose 5%. 1000 milliLiter(s) (100 mL/Hr) IV Continuous <Continuous>  dextrose 5%. 1000 milliLiter(s) (50 mL/Hr) IV Continuous <Continuous>  dextrose 50% Injectable 25 Gram(s) IV Push once  dextrose 50% Injectable 25 Gram(s) IV Push once  dextrose 50% Injectable 12.5 Gram(s) IV Push once  enoxaparin Injectable 40 milliGRAM(s) SubCutaneous every 24 hours  fluconAZOLE IVPB 200 milliGRAM(s) IV Intermittent every 24 hours  glucagon  Injectable 1 milliGRAM(s) IntraMuscular once  influenza  Vaccine (HIGH DOSE) 0.7 milliLiter(s) IntraMuscular once  insulin lispro (ADMELOG) corrective regimen sliding scale   SubCutaneous at bedtime  insulin lispro (ADMELOG) corrective regimen sliding scale   SubCutaneous three times a day before meals  melatonin 5 milliGRAM(s) Oral at bedtime  meropenem  IVPB 1000 milliGRAM(s) IV Intermittent every 8 hours  mirtazapine 7.5 milliGRAM(s) Oral at bedtime  pantoprazole  Injectable 40 milliGRAM(s) IV Push every 12 hours  polyethylene glycol 3350 17 Gram(s) Oral daily  sodium chloride 1 Gram(s) Oral two times a day  sucralfate suspension 1 Gram(s) Oral every 6 hours  urea Oral Powder 15 Gram(s) Oral daily    MEDICATIONS  (PRN):  dextrose Oral Gel 15 Gram(s) Oral once PRN Blood Glucose LESS THAN 70 milliGRAM(s)/deciliter  ondansetron Injectable 8 milliGRAM(s) IV Push every 6 hours PRN Nausea and/or Vomiting      Vital Signs Last 24 Hrs  T(C): 36.1 (19 Sep 2023 04:53), Max: 36.6 (18 Sep 2023 21:50)  T(F): 96.9 (19 Sep 2023 04:53), Max: 97.8 (18 Sep 2023 21:50)  HR: 70 (19 Sep 2023 07:01) (62 - 78)  BP: 152/68 (19 Sep 2023 07:01) (139/81 - 180/88)  BP(mean): 98 (19 Sep 2023 07:01) (98 - 122)  RR: 17 (19 Sep 2023 07:01) (17 - 18)  SpO2: 95% (19 Sep 2023 07:01) (92% - 100%)    Parameters below as of 19 Sep 2023 07:01  Patient On (Oxygen Delivery Method): room air      PHYSICAL EXAM:  Constitutional: A&Ox1-2, resting comfortably in bed  Respiratory: non labored breathing, no respiratory distress  Cardiovascular: NSR, RRR  Gastrointestinal: Abdomen soft, NTND. Right FABIANO with brownish/purulent output and PTC drain bilious.   Genitourinary: Voiding  Extremities: wwp, no edema, +SCDs        I&O's Detail    18 Sep 2023 07:01  -  19 Sep 2023 07:00  --------------------------------------------------------  IN:    dextrose 5% + sodium chloride 0.45%: 1560 mL    Oral Fluid: 120 mL  Total IN: 1680 mL    OUT:    Drain (mL): 15 mL    Drain (mL): 175 mL    Emesis (mL): 0 mL    Voided (mL): 780 mL  Total OUT: 970 mL    Total NET: 710 mL      19 Sep 2023 07:01  -  19 Sep 2023 07:30  --------------------------------------------------------  IN:    dextrose 5% + sodium chloride 0.45%: 120 mL  Total IN: 120 mL    OUT:  Total OUT: 0 mL    Total NET: 120 mL      LABS:                        9.3    13.89 )-----------( 151      ( 19 Sep 2023 05:17 )             29.1     09-19    136  |  105  |  27<H>  ----------------------------<  126<H>  3.8   |  21<L>  |  1.13    Ca    8.4      19 Sep 2023 05:17  Phos  3.2     09-19  Mg     2.1     09-19    TPro  6.2  /  Alb  1.9<L>  /  TBili  12.6<H>  /  DBili  8.8<H>  /  AST  88<H>  /  ALT  30  /  AlkPhos  323<H>  09-19    PT/INR - ( 19 Sep 2023 05:17 )   PT: 17.4 sec;   INR: 1.55          PTT - ( 19 Sep 2023 05:17 )  PTT:35.1 sec  Urinalysis Basic - ( 19 Sep 2023 05:17 )    Color: x / Appearance: x / SG: x / pH: x  Gluc: 126 mg/dL / Ketone: x  / Bili: x / Urobili: x   Blood: x / Protein: x / Nitrite: x   Leuk Esterase: x / RBC: x / WBC x   Sq Epi: x / Non Sq Epi: x / Bacteria: x        RADIOLOGY & ADDITIONAL STUDIES:

## 2023-09-19 NOTE — PRE-ANESTHESIA EVALUATION ADULT - NSANTHOSAYNRD_GEN_A_CORE
No. TAYLOR screening performed.  STOP BANG Legend: 0-2 = LOW Risk; 3-4 = INTERMEDIATE Risk; 5-8 = HIGH Risk
No. TAYLOR screening performed.  STOP BANG Legend: 0-2 = LOW Risk; 3-4 = INTERMEDIATE Risk; 5-8 = HIGH Risk

## 2023-09-19 NOTE — BRIEF OPERATIVE NOTE - COMMENTS
10 cc contrast
admit to ICU for hemodynamic monitoring   biliary drainage catheter connect to bile bag to gravity. Monitor output  continue management per primary care  follow up biliary and peritoneal fluid studies

## 2023-09-19 NOTE — BH CONSULTATION LIAISON PROGRESS NOTE - NSBHASSESSMENTFT_PSY_ALL_CORE
67 yo male, with PPH of "depression" 15-20 years prior, now with PMH HTN, GERD and recent diagnosis of stage IV pancreatic cancer with numerous liver metastasis, presented to [facility] and diagnosed with stage IV adenocarcinoma, s/p liver abscess drainage x 6 8/15, s/p ERCp with two stent placement as per records, getting paracentesis with possibly PCT drain placement, history of klebsiella bacteremia from outpatient basis. Psychiatry initially consulted for recommendations for anxiety/depression now following up for agitation/confusion. On assessment pt presents AAOx1-2, unable to provide a timeline or a reason for admission. Per staff pt has been having VH and waxing and waning. Pt's current presentation is consistent with delirium.  Plan:  -start gabapentin 300mg po q12h for treatment of agitation associated with delirium   -hold for now mirtazapine  -consider nursing observation   -for breakthrough agitation, can use olanzapine 2.5mg po/im q6h prn; monitor for qtc prolongation  - Delirium Precautions:  Orientation protocols: Provision of clocks, calendars, windows with outside views and frequent verbal reorientation of patients.  Avoiding and/or monitoring the use of problematic medications: Avoid benzodiazepines. Use caution when prescribing opioids, dihydropyridines, anticholinergics, antihistamines.  Cognitive stimulation: Regular visits from family and friends. Avoid overstimulation (particularly at night)Facilitation of physiologic sleep: Nursing and medical procedures, including the administration ofmedications, should be avoided during sleeping hours when possible. Night-time noise should be reduced.  Early mobilization and minimized use of physical restraints for patients with limited mobility.   Visual and hearing aids for patients with these impairments  -CL to follow

## 2023-09-19 NOTE — BRIEF OPERATIVE NOTE - OPERATION/FINDINGS
Patient placed under MAC sedation. Tube study of prior biliary drainage catheter performed with 10 cc contrast revealing patent intrahepatic biliary ducts and patient prior biliary stent. 3-way stopcock placed between drain and bag and closed to patient to allow for internal drainage

## 2023-09-19 NOTE — ADVANCED PRACTICE NURSE CONSULT - REASON FOR CONSULT
reval of gluteals
gluteal wound, present on admission    per chart review, 68M with PMHx of HTN, GERD and recent diagnosis of stage IV pancreatic cancer with numerous liver metastasis who presents to [facility] from home for second opinion regarding treatmnet options. Patietn afebrile and HD stable on admission, but per outpatient report, pain had positive blood cultures for Klebsiella in the past.

## 2023-09-19 NOTE — BRIEF OPERATIVE NOTE - NSICDXBRIEFPROCEDURE_GEN_ALL_CORE_FT
PROCEDURES:  Percutaneous drainage of ascites 12-Sep-2023 16:21:39  Daria Mcguire  Replacement of percutaneous transhepatic drainage catheter 12-Sep-2023 16:23:28  Daria Mcguire  
PROCEDURES:  Biliary tube check 19-Sep-2023 14:26:56  Beto Pereyra

## 2023-09-19 NOTE — PROGRESS NOTE ADULT - ASSESSMENT
68M with PMHx of HTN, GERD and recent diagnosis of stage IV pancreatic cancer with numerous liver metastasis presented to [facility] and diagnosed with stage IV adenocarcinoma, s/p liver abscess drainage x 6 8/15, s/p ERCp with two stent placement as per records, getting paracentesis with possibly PCT drain placement, history of klebsiella bacteremia from outpatient basis , patient pending upgrade to ICU for monitoring, biliary drain placed, 2.5 L fluid drained which was clear and yellow, hypotensive during procedure, 2 FFPs given pre-op, biliary tree stents visualized and in place.    Delirium - AMS unlikely due to sepsis, possibly hospital delirium - will get Psych consult and f/u on recs  Mild euvolemic hyponatremia which did not auto correcting due to poor oral intake and liver disease - resolved on BID Salt tablets, Urea daily; Isotonic fluids can be continued for nutrition status, if poor nutrition continues recommend dietitian f/u and alternative forms of nutrition ( parenteral / NGT )   Hypoalbuminemia can lead to worsening third spacing, would recommend IV albumin infusion as needed  PRN FFPS as needed pre op for elevated coag levels   IR s/p paracentesis with PTC drain placement, bilirubin continues to be elevated - will get tube check today  acute kidney injury on CKD - resolved   peritoneal cultures / blood culture - NGTD   Bile culture -VRE, klebsiella, citrobacter, candida   ID evaluated - merrem / dapto, also gave reccs for PO meds for discharge- quinolone / zyvox    IVF diet as per surgery   normocytic anemia s/p PRBCs transfusions, HB stable   hemodynamics are stable after PRBC / fluid resuscitation - monitor for orthostasis   dvt ppx as per surgery   HTN- continue on coreg, BP remained uncontrolled added on norvasc 5 mg daily, which was increased to 10mg for better control

## 2023-09-19 NOTE — PROGRESS NOTE ADULT - SUBJECTIVE AND OBJECTIVE BOX
Infectious Disease Attending     Antibiotics  Fluconazole 200 mg IV daily (day #3)   Daptomycin 800 mg IV daily (day #6)   Meropenem 1000 mg IV three times daily (day #6)     Seen and evaluated; afebrile amidst improved sensorium and   reduction in abdominal pain - status post drain study notable   for patentcy of intrahepatic biliary ducts     Physical Examination    Vital Signs: T 96.9 F /71 Sat 100% RA  HEENT: Sclerae icteric  Respiratory: Diminished breath sounds at bases   Abdominal: Soft + tenderness to palpation + drains in place   Extremities: No edema   Dermatologic: Jaundiced    Laboratory Data   Leukocytes 13.8  Platelets 151  Creatinine 1.1  Direct bilirubin 8.8  Alkaline phosphatase 323    Microbiology   Bile culture (9/12) Citrobacter freundii                            Klebsiella pneumoniae (ESBL)                             Enterococcus faecalis                             Enterococcus faecium (VRE)                             Candida tropicalis   Blood cultures (9/11) no growth x 5 days

## 2023-09-19 NOTE — PROGRESS NOTE ADULT - SUBJECTIVE AND OBJECTIVE BOX
Patient is a 68y old  Male who presents with a chief complaint of metastatic pancreatic ca now s/p PTC placement and drainage of 2.5L of clear, yellow ascites (9/12).     Overnight, patient seemed to have some delirium and throughout the day waxes and wanes in regards to his mental status. This morning, he is somewhat appropriate and denies any headaches, CP, SOB, abdominal pain, N/V.    PAST MEDICAL/SURGICAL HISTORY  PAST MEDICAL & SURGICAL HISTORY:  Pancreatic cancer metastasized to liver  S/P hip replacement, left      MEDICATIONS  (STANDING):  bisacodyl Suppository 10 milliGRAM(s) Rectal every 24 hours  carvedilol 6.25 milliGRAM(s) Oral every 12 hours  chlorhexidine 2% Cloths 1 Application(s) Topical <User Schedule>  DAPTOmycin IVPB 800 milliGRAM(s) IV Intermittent every 24 hours  dextrose 5% + sodium chloride 0.45%. 1000 milliLiter(s) (120 mL/Hr) IV Continuous <Continuous>  dextrose 5%. 1000 milliLiter(s) (50 mL/Hr) IV Continuous <Continuous>  dextrose 5%. 1000 milliLiter(s) (100 mL/Hr) IV Continuous <Continuous>  dextrose 50% Injectable 25 Gram(s) IV Push once  dextrose 50% Injectable 25 Gram(s) IV Push once  dextrose 50% Injectable 12.5 Gram(s) IV Push once  enoxaparin Injectable 40 milliGRAM(s) SubCutaneous every 24 hours  fluconAZOLE IVPB 200 milliGRAM(s) IV Intermittent every 24 hours  glucagon  Injectable 1 milliGRAM(s) IntraMuscular once  influenza  Vaccine (HIGH DOSE) 0.7 milliLiter(s) IntraMuscular once  insulin lispro (ADMELOG) corrective regimen sliding scale   SubCutaneous three times a day before meals  insulin lispro (ADMELOG) corrective regimen sliding scale   SubCutaneous at bedtime  melatonin 5 milliGRAM(s) Oral at bedtime  meropenem  IVPB 1000 milliGRAM(s) IV Intermittent every 8 hours  mirtazapine 7.5 milliGRAM(s) Oral at bedtime  pantoprazole  Injectable 40 milliGRAM(s) IV Push every 12 hours  polyethylene glycol 3350 17 Gram(s) Oral daily  potassium chloride  10 mEq/100 mL IVPB 10 milliEquivalent(s) IV Intermittent every 4 hours  sodium chloride 1 Gram(s) Oral two times a day  sucralfate suspension 1 Gram(s) Oral every 6 hours  urea Oral Powder 15 Gram(s) Oral daily    MEDICATIONS  (PRN):  dextrose Oral Gel 15 Gram(s) Oral once PRN Blood Glucose LESS THAN 70 milliGRAM(s)/deciliter  ondansetron Injectable 8 milliGRAM(s) IV Push every 6 hours PRN Nausea and/or Vomiting    T(C): 36.1 (09-19-23 @ 04:53), Max: 36.6 (09-18-23 @ 21:50)  HR: 70 (09-19-23 @ 09:10) (62 - 78)  BP: 132/92 (09-19-23 @ 09:10) (132/92 - 173/72)  RR: 18 (09-19-23 @ 09:10) (17 - 18)  SpO2: 94% (09-19-23 @ 09:10) (92% - 100%)  Wt(kg): --Vital Signs Last 24 Hrs  T(C): 36.1 (19 Sep 2023 04:53), Max: 36.6 (18 Sep 2023 21:50)  T(F): 96.9 (19 Sep 2023 04:53), Max: 97.8 (18 Sep 2023 21:50)  HR: 70 (19 Sep 2023 09:10) (62 - 78)  BP: 132/92 (19 Sep 2023 09:10) (132/92 - 173/72)  BP(mean): 106 (19 Sep 2023 09:10) (98 - 117)  RR: 18 (19 Sep 2023 09:10) (17 - 18)  SpO2: 94% (19 Sep 2023 09:10) (92% - 100%)    Parameters below as of 19 Sep 2023 09:10  Patient On (Oxygen Delivery Method): room air      Oxygen Saturation Index= Unable to calculate   [Based on FiO2 = Unknown, SpO2 = 94(09/19/2023 09:10), MAP = Unknown]  Daily     Daily     PHYSICAL EXAM:  GENERAL: NAD  NERVOUS SYSTEM:  Alert & Oriented X2, to self and time but not to place  CHEST/LUNG: CTAB; No rales, rhonchi, wheezing, or rubs  HEART: Regular rate and rhythm; No murmurs, rubs, or gallops  ABDOMEN: Soft, Nontender, Nondistended; Bowel sounds present; Right FABIANO with brownish/purulent output and PTC drain with bilious output  EXTREMITIES: No edema      CAPILLARY BLOOD GLUCOSE    POCT Blood Glucose.: 124 mg/dL (19 Sep 2023 07:02)  POCT Blood Glucose.: 96 mg/dL (18 Sep 2023 21:42)  POCT Blood Glucose.: 115 mg/dL (18 Sep 2023 16:10)  POCT Blood Glucose.: 112 mg/dL (18 Sep 2023 11:48)      LABS:  CBC   09-19-23 @ 05:17  Hematcorit 29.1  Hemoglobin 9.3  Mean Cell Hemoglobin 31.7  Platelet Count-Automated 151  RBC Count 2.93  Red Cell Distrib Width 21.0  Wbc Count 13.89      BMP  09-19-23 @ 05:17  Anion Gap. Serum 10  Blood Urea Nitrogen,Serm 27  Calcium, Total Serum 8.4  Carbon Dioxide, Serum 21  Chloride, Serum 105  Creatinine, Serum 1.13  eGFR in  --  eGFR in Non Afican American --  Gloucose, serum 126  Potassium, Serum 3.8  Sodium, Serum 136    09-18-23 @ 05:48  Anion Gap. Serum 8  Blood Urea Nitrogen,Serm 25  Calcium, Total Serum 8.0  Carbon Dioxide, Serum 21  Chloride, Serum 106  Creatinine, Serum 1.08  eGFR in  --  eGFR in Non Afican American --  Gloucose, serum 118  Potassium, Serum 4.1  Sodium, Serum 135    09-17-23 @ 07:00  Anion Gap. Serum 11  Blood Urea Nitrogen,Serm 22  Calcium, Total Serum 8.0  Carbon Dioxide, Serum 21  Chloride, Serum 106  Creatinine, Serum 1.09  eGFR in  --  eGFR in Non Afican American --  Gloucose, serum 115  Potassium, Serum 3.7  Sodium, Serum 138    09-16-23 @ 20:58  Anion Gap. Serum 10  Blood Urea Nitrogen,Serm 24  Calcium, Total Serum 7.8  Carbon Dioxide, Serum 22  Chloride, Serum 106  Creatinine, Serum 1.15  eGFR in  --  eGFR in Non Afican American --  Gloucose, serum 131  Potassium, Serum 3.6  Sodium, Serum 138    CMP  09-19-23 @ 05:17  Komal Aminotransferase(ALT/SGPT)30  Albumin, Serum 1.9  Alkaline Phosphatase, Serum 323  Anion Gap, Serum 10  Aspartate Aminotransferase (AST/SGOT)88  Bilirubin Total, Serum 12.6  Blood Urea Nitrogen, Serum 27  Calcium,Total Serum 8.4  Carbon Dioxide, Serum 21  Chloride, Serum 105  Creatinine, Serum 1.13  eGFR if  --  eGFR if Non African American --  Glucose, Serum 126  Potassium, Serum 3.8  Protein Total, Serum 6.2  Sodium, Serum 136    09-18-23 @ 05:48  Komal Aminotransferase(ALT/SGPT)30  Albumin, Serum 1.9  Alkaline Phosphatase, Serum 318  Anion Gap, Serum 8  Aspartate Aminotransferase (AST/SGOT)76  Bilirubin Total, Serum 11.4  Blood Urea Nitrogen, Serum 25  Calcium,Total Serum 8.0  Carbon Dioxide, Serum 21  Chloride, Serum 106  Creatinine, Serum 1.08  eGFR if  --  eGFR if Non African American --  Glucose, Serum 118  Potassium, Serum 4.1  Protein Total, Serum 6.0  Sodium, Serum 135    09-17-23 @ 07:00  Komal Aminotransferase(ALT/SGPT)30  Albumin, Serum 1.7  Alkaline Phosphatase, Serum 327  Anion Gap, Serum 11  Aspartate Aminotransferase (AST/SGOT)71  Bilirubin Total, Serum 11.5  Blood Urea Nitrogen, Serum 22  Calcium,Total Serum 8.0  Carbon Dioxide, Serum 21  Chloride, Serum 106  Creatinine, Serum 1.09  eGFR if  --  eGFR if Non African American --  Glucose, Serum 115  Potassium, Serum 3.7  Protein Total, Serum 5.8  Sodium, Serum 138    09-16-23 @ 20:58  Komal Aminotransferase(ALT/SGPT)--  Albumin, Serum --  Alkaline Phosphatase, Serum --  Anion Gap, Serum 10  Aspartate Aminotransferase (AST/SGOT)--  Bilirubin Total, Serum --  Blood Urea Nitrogen, Serum 24  Calcium,Total Serum 7.8  Carbon Dioxide, Serum 22  Chloride, Serum 106  Creatinine, Serum 1.15  eGFR if  --  eGFR if Non African American --  Glucose, Serum 131  Potassium, Serum 3.6  Protein Total, Serum --  Sodium, Serum 138    PT/INR  PT/INR  09-19-23 @ 05:17  INR 1.55  Prothrombin Time Comment --  Prothrobin Time, [id number]

## 2023-09-19 NOTE — PRE-ANESTHESIA EVALUATION ADULT - NSPROPOSEDPROCEDFT_GEN_ALL_CORE
Percutaneous transhepatic angiogram
ascites drainage, percutaneous transhepatic biliary drain placement

## 2023-09-20 NOTE — PROGRESS NOTE ADULT - SUBJECTIVE AND OBJECTIVE BOX
Infectious Disease Attending     Antibiotics  Fluconazole 200 mg IV daily (day #4)   Daptomycin 800 mg IV daily (day #7)   Meropenem 1000 mg IV three times daily (day #7)     Seen and evaluated; afebrile however encephalopathic      Physical Examination    Vital Signs: T 96.6 F /66 Sat 97% RA  HEENT: Sclerae icteric  Respiratory: Diminished breath sounds at bases   Abdominal: Soft + tenderness to palpation + drains in place   Extremities: No edema   Dermatologic: Jaundiced    Laboratory Data   Leukocytes 13.7  Platelets 162  Creatinine 1.0  Direct bilirubin 8.4  Alkaline phosphatase 294    Microbiology   Bile culture (9/12) Citrobacter freundii                            Klebsiella pneumoniae (ESBL)                             Enterococcus faecalis                             Enterococcus faecium (VRE)                             Candida tropicalis   Blood cultures (9/11) no growth x 5 days

## 2023-09-20 NOTE — PROGRESS NOTE ADULT - SUBJECTIVE AND OBJECTIVE BOX
Patient is a 68y old  Male who presents with a chief complaint of metastatic pancreatic ca now s/p PTC placement and drainage of 2.5L of clear, yellow ascites (9/12), now s/p Tube check wnl and PTC internalized on 9/19.    Overnight, patient continued to be altered having hallucinations and remained incoherent. This morning, patient was unresponsive and inappropriate. ROS could not be obtained.    PAST MEDICAL/SURGICAL HISTORY  PAST MEDICAL & SURGICAL HISTORY:  Pancreatic cancer metastasized to liver  S/P hip replacement, left      MEDICATIONS  (STANDING):  amLODIPine   Tablet 10 milliGRAM(s) Oral daily  bisacodyl Suppository 10 milliGRAM(s) Rectal every 24 hours  carvedilol 6.25 milliGRAM(s) Oral every 12 hours  chlorhexidine 2% Cloths 1 Application(s) Topical <User Schedule>  DAPTOmycin IVPB 800 milliGRAM(s) IV Intermittent every 24 hours  dextrose 5%. 1000 milliLiter(s) (50 mL/Hr) IV Continuous <Continuous>  dextrose 5%. 1000 milliLiter(s) (100 mL/Hr) IV Continuous <Continuous>  dextrose 50% Injectable 25 Gram(s) IV Push once  dextrose 50% Injectable 12.5 Gram(s) IV Push once  dextrose 50% Injectable 25 Gram(s) IV Push once  enoxaparin Injectable 40 milliGRAM(s) SubCutaneous every 24 hours  fluconAZOLE IVPB 200 milliGRAM(s) IV Intermittent every 24 hours  gabapentin 300 milliGRAM(s) Oral two times a day  glucagon  Injectable 1 milliGRAM(s) IntraMuscular once  influenza  Vaccine (HIGH DOSE) 0.7 milliLiter(s) IntraMuscular once  insulin lispro (ADMELOG) corrective regimen sliding scale   SubCutaneous three times a day before meals  insulin lispro (ADMELOG) corrective regimen sliding scale   SubCutaneous at bedtime  melatonin 5 milliGRAM(s) Oral at bedtime  meropenem  IVPB 1000 milliGRAM(s) IV Intermittent every 8 hours  pantoprazole  Injectable 40 milliGRAM(s) IV Push every 12 hours  polyethylene glycol 3350 17 Gram(s) Oral daily  potassium phosphate IVPB 15 milliMole(s) IV Intermittent once  sodium chloride 1 Gram(s) Oral two times a day  sucralfate suspension 1 Gram(s) Oral every 6 hours  urea Oral Powder 15 Gram(s) Oral daily    MEDICATIONS  (PRN):  dextrose Oral Gel 15 Gram(s) Oral once PRN Blood Glucose LESS THAN 70 milliGRAM(s)/deciliter  ondansetron Injectable 8 milliGRAM(s) IV Push every 6 hours PRN Nausea and/or Vomiting      T(C): 35.8 (09-20-23 @ 09:07), Max: 36.1 (09-19-23 @ 13:52)  HR: 70 (09-20-23 @ 08:40) (70 - 80)  BP: 146/66 (09-20-23 @ 08:40) (128/93 - 164/90)  RR: 17 (09-20-23 @ 08:40) (17 - 20)  SpO2: 97% (09-20-23 @ 08:40) (96% - 100%)  Wt(kg): --Vital Signs Last 24 Hrs  T(C): 35.8 (20 Sep 2023 09:07), Max: 36.1 (19 Sep 2023 13:52)  T(F): 96.5 (20 Sep 2023 09:07), Max: 96.6 (20 Sep 2023 04:29)  HR: 70 (20 Sep 2023 08:40) (70 - 80)  BP: 146/66 (20 Sep 2023 08:40) (128/93 - 164/90)  BP(mean): 95 (20 Sep 2023 08:40) (95 - 119)  RR: 17 (20 Sep 2023 08:40) (17 - 20)  SpO2: 97% (20 Sep 2023 08:40) (96% - 100%)    Parameters below as of 20 Sep 2023 08:40  Patient On (Oxygen Delivery Method): room air      Oxygen Saturation Index= Unable to calculate   [Based on FiO2 = Unknown, SpO2 = 97(09/20/2023 08:40), MAP = Unknown]  Daily Height in cm: 182.88 (19 Sep 2023 13:52)    Daily     PHYSICAL EXAM: limited given mental status  EYES: scleral icterus  NERVOUS SYSTEM:  Alert & Oriented X0, incoherent, minimally responsive  CHEST/LUNG: CTAB; No rales, rhonchi, wheezing, or rubs  HEART: Regular rate and rhythm; No murmurs, rubs, or gallops  ABDOMEN: Soft, Nontender, Nondistended; Soft, Nontender, Nondistended; Bowel sounds present; Right FABIANO with brownish/purulent output and PTC capped  EXTREMITIES: No edema  SKIN: jaundice      CAPILLARY BLOOD GLUCOSE      POCT Blood Glucose.: 93 mg/dL (20 Sep 2023 06:22)  POCT Blood Glucose.: 130 mg/dL (19 Sep 2023 21:57)  POCT Blood Glucose.: 126 mg/dL (19 Sep 2023 16:14)  POCT Blood Glucose.: 110 mg/dL (19 Sep 2023 11:43)      LABS:  CBC   09-20-23 @ 06:06  Hematcorit 28.2  Hemoglobin 9.0  Mean Cell Hemoglobin 32.1  Platelet Count-Automated 162  RBC Count 2.80  Red Cell Distrib Width 20.9  Wbc Count 13.74      BMP  09-20-23 @ 06:06  Anion Gap. Serum 11  Blood Urea Nitrogen,Serm 31  Calcium, Total Serum 8.4  Carbon Dioxide, Serum 20  Chloride, Serum 108  Creatinine, Serum 1.08  eGFR in  --  eGFR in Non Afican American --  Gloucose, serum 110  Potassium, Serum 3.9  Sodium, Serum 139    09-19-23 @ 05:17  Anion Gap. Serum 10  Blood Urea Nitrogen,Serm 27  Calcium, Total Serum 8.4  Carbon Dioxide, Serum 21  Chloride, Serum 105  Creatinine, Serum 1.13  eGFR in  --  eGFR in Non Afican American --  Gloucose, serum 126  Potassium, Serum 3.8  Sodium, Serum 136    09-18-23 @ 05:48  Anion Gap. Serum 8  Blood Urea Nitrogen,Serm 25  Calcium, Total Serum 8.0  Carbon Dioxide, Serum 21  Chloride, Serum 106  Creatinine, Serum 1.08  eGFR in  --  eGFR in Non Afican American --  Gloucose, serum 118  Potassium, Serum 4.1  Sodium, Serum 135    CMP  09-20-23 @ 06:06  Komal Aminotransferase(ALT/SGPT)30  Albumin, Serum 2.0  Alkaline Phosphatase, Serum 294  Anion Gap, Serum 11  Aspartate Aminotransferase (AST/SGOT)90  Bilirubin Total, Serum 11.2  Blood Urea Nitrogen, Serum 31  Calcium,Total Serum 8.4  Carbon Dioxide, Serum 20  Chloride, Serum 108  Creatinine, Serum 1.08  eGFR if  --  eGFR if Non African American --  Glucose, Serum 110  Potassium, Serum 3.9  Protein Total, Serum 6.1  Sodium, Serum 139    09-19-23 @ 05:17  Komal Aminotransferase(ALT/SGPT)30  Albumin, Serum 1.9  Alkaline Phosphatase, Serum 323  Anion Gap, Serum 10  Aspartate Aminotransferase (AST/SGOT)88  Bilirubin Total, Serum 12.6  Blood Urea Nitrogen, Serum 27  Calcium,Total Serum 8.4  Carbon Dioxide, Serum 21  Chloride, Serum 105  Creatinine, Serum 1.13  eGFR if  --  eGFR if Non African American --  Glucose, Serum 126  Potassium, Serum 3.8  Protein Total, Serum 6.2  Sodium, Serum 136    09-18-23 @ 05:48  Komal Aminotransferase(ALT/SGPT)30  Albumin, Serum 1.9  Alkaline Phosphatase, Serum 318  Anion Gap, Serum 8  Aspartate Aminotransferase (AST/SGOT)76  Bilirubin Total, Serum 11.4  Blood Urea Nitrogen, Serum 25  Calcium,Total Serum 8.0  Carbon Dioxide, Serum 21  Chloride, Serum 106  Creatinine, Serum 1.08  eGFR if  --  eGFR if Non African American --  Glucose, Serum 118  Potassium, Serum 4.1  Protein Total, Serum 6.0  Sodium, Serum 135    PT/INR  PT/INR  09-19-23 @ 05:17  INR 1.55  Prothrombin Time Comment --  Prothrobin Time, [id number]

## 2023-09-20 NOTE — CHART NOTE - NSCHARTNOTEFT_GEN_A_CORE
Admitting Diagnosis:   Patient is a 68y old  Male who presents with a chief complaint of metastatic pancreatic ca   PAST MEDICAL & SURGICAL HISTORY:  Pancreatic cancer metastasized to liver  S/P hip replacement, left    Current Nutrition Order: Clear liquids    PO Intake: Good (%) [   ]  Fair (50-75%) [   ] Poor (<25%) [ x ]    GI Issues: Intermittent nausea, No V/D,     Pain: no noted pain/discomfort per chart/nursing information    Skin Integrity: FABIANO drain, biliary tube in place; stage 2 PI to sacrum; 2+ dependent, generalized edema; Rich score 13    Labs:   09-20    139  |  108  |  31<H>  ----------------------------<  110<H>  3.9   |  20<L>  |  1.08    Ca    8.4      20 Sep 2023 06:06  Phos  2.7     09-20  Mg     2.0     09-20    TPro  6.1  /  Alb  2.0<L>  /  TBili  11.2<H>  /  DBili  8.4<H>  /  AST  90<H>  /  ALT  30  /  AlkPhos  294<H>  09-20    CAPILLARY BLOOD GLUCOSE      POCT Blood Glucose.: 97 mg/dL (20 Sep 2023 11:47)  POCT Blood Glucose.: 93 mg/dL (20 Sep 2023 06:22)  POCT Blood Glucose.: 130 mg/dL (19 Sep 2023 21:57)  POCT Blood Glucose.: 126 mg/dL (19 Sep 2023 16:14)      Medications:  MEDICATIONS  (STANDING):  amLODIPine   Tablet 10 milliGRAM(s) Oral daily  bisacodyl Suppository 10 milliGRAM(s) Rectal every 24 hours  carvedilol 6.25 milliGRAM(s) Oral every 12 hours  chlorhexidine 2% Cloths 1 Application(s) Topical <User Schedule>  DAPTOmycin IVPB 800 milliGRAM(s) IV Intermittent every 24 hours  dextrose 5%. 1000 milliLiter(s) (50 mL/Hr) IV Continuous <Continuous>  dextrose 5%. 1000 milliLiter(s) (100 mL/Hr) IV Continuous <Continuous>  dextrose 50% Injectable 12.5 Gram(s) IV Push once  dextrose 50% Injectable 25 Gram(s) IV Push once  dextrose 50% Injectable 25 Gram(s) IV Push once  enoxaparin Injectable 40 milliGRAM(s) SubCutaneous every 24 hours  fluconAZOLE IVPB 200 milliGRAM(s) IV Intermittent every 24 hours  gabapentin 300 milliGRAM(s) Oral two times a day  glucagon  Injectable 1 milliGRAM(s) IntraMuscular once  influenza  Vaccine (HIGH DOSE) 0.7 milliLiter(s) IntraMuscular once  insulin lispro (ADMELOG) corrective regimen sliding scale   SubCutaneous three times a day before meals  insulin lispro (ADMELOG) corrective regimen sliding scale   SubCutaneous at bedtime  melatonin 5 milliGRAM(s) Oral at bedtime  meropenem  IVPB 1000 milliGRAM(s) IV Intermittent every 8 hours  pantoprazole  Injectable 40 milliGRAM(s) IV Push every 12 hours  polyethylene glycol 3350 17 Gram(s) Oral daily  potassium phosphate IVPB 15 milliMole(s) IV Intermittent once  sodium chloride 1 Gram(s) Oral two times a day  sucralfate suspension 1 Gram(s) Oral every 6 hours  urea Oral Powder 15 Gram(s) Oral daily    MEDICATIONS  (PRN):  dextrose Oral Gel 15 Gram(s) Oral once PRN Blood Glucose LESS THAN 70 milliGRAM(s)/deciliter  ondansetron Injectable 8 milliGRAM(s) IV Push every 6 hours PRN Nausea and/or Vomiting      Dosing Anthropometrics:   Height for BMI (FEET)	6 Feet  Height for BMI (INCHES)	0 Inch(s)  Height for BMI (CM)	182.88 Centimeter(s)  Weight for BMI (lbs)	195.1 lb  Weight for BMI (kg)	88.5 kg  Body Mass Index	              26.4  Ideal body weight: 178 pounds, pt is 110% of ideal body weight, current body weight to be utilized.     Weight Change: no new weights noted in electronic medical records: strongly recommend obtaining updated weight related to pt's ascites drainage planned for today (9/15/23); RD to continue to trend.     Nutrition Focused Physical Exam: Completed [ x on initial assessment on 9/11/23 ]  Not Pertinent [   ]  Pt's signs of muscle and fat loss remain. RD to monitor.     Estimated energy needs:   Estimated Energy Needs From (anton/kg)	30  Estimated Energy Needs To (anton/kg)	35  Estimated Energy Needs Calculated From (anton/kg)	2655  Estimated Energy Needs Calculated To (anton/kg)	3097    Estimated Protein Needs From (g/kg)	1.4  Estimated Protein Needs To (g/kg)	1.6  Estimated Protein Needs Calculated From (g/kg)	123.9  Estimated Protein Needs Calculated To (g/kg)	141.6    Estimated Fluid Needs From (ml/kg)	30  Estimated Fluid Needs To (ml/kg)	35  Estimated Fluid Needs Calculated From (ml/kg)	2655  Estimated Fluid Needs Calculated To (ml/kg)	3097  Other Calculations	Based on Standards of Care pt within % ideal body weight, thus actual body weight used for all calculations. Needs adjusted for advanced age, clinical status and malnutrition (repletion).    Subjective: 68M with PMHx of HTN, GERD and recent diagnosis of stage IV pancreatic cancer with numerous liver metastasis presented to [facility] and diagnosed with stage IV adenocarcinoma, s/p liver abscess drainage x 6 8/15, s/p ERCp with two stent placement as per records, getting paracentesis with possibly PCT drain placement, history of klebsiella bacteremia from outpatient basis , patient pending upgrade to ICU for monitoring, biliary drain placed, 2.5 L fluid drained which was clear and yellow, hypotensive during procedure, 2 FFPs given pre-op, biliary tree stents visualized and in place,  now s/p Tube check wnl and PTC internalized on 9/19 with worsening encephalopathy.        Previous Nutrition Diagnosis: Malnutrition of severe degree in the context of chronic illness/injury related to inadequate PO intakes secondary to pt's condition/clinical status as evidenced by moderate to severe muscle/fat loss per NFPE, ~29% wt loss in <1 year, </=75% [id number]    Active [ x ]  Resolved [   ]    Goal: Pt to consistently meet at least 75% of EEE via tolerated route that is consistent with goals of care and will no longer exhibit malnutrition during hospital stay    Recommendations:  1. Continue clear liquid diet, advance diet as medically feasible  >>Recommend add ensure clear TID  >>If predicted prolonged clear liquid diet recommend consider parenteral nutrition support   2. Encourage pt to meet nutritional needs as able   3. Monitor PO intake/diet advance, trend weights (weekly), monitor skin integrity, monitor labs (electrolytes, CMP), monitor GI function  4. Encourage adherence to diet education (reinforce as able)   5. Consider micronutrient supplementation (multivitamin, thiamine) once feasible  6. Pain and bowel regimen per team   7. Will continue to assess/honor preferences as able   8. Align nutrition interventions with goals of care at all times     Education:     Risk Level: High [ x ] Moderate [   ] Low [   ]. Admitting Diagnosis:   Patient is a 68y old  Male who presents with a chief complaint of metastatic pancreatic ca   PAST MEDICAL & SURGICAL HISTORY:  Pancreatic cancer metastasized to liver  S/P hip replacement, left    Current Nutrition Order: Clear liquids    PO Intake: Good (%) [   ]  Fair (50-75%) [   ] Poor (<25%) [ x ]    GI Issues: Intermittent nausea, No V/D,     Pain: no noted pain/discomfort per chart/nursing information    Skin Integrity: FABIANO drain, biliary tube in place; stage 2 PI to sacrum; 2+ dependent, generalized edema; Rich score 13    Labs:   09-20    139  |  108  |  31<H>  ----------------------------<  110<H>  3.9   |  20<L>  |  1.08    Ca    8.4      20 Sep 2023 06:06  Phos  2.7     09-20  Mg     2.0     09-20    TPro  6.1  /  Alb  2.0<L>  /  TBili  11.2<H>  /  DBili  8.4<H>  /  AST  90<H>  /  ALT  30  /  AlkPhos  294<H>  09-20    CAPILLARY BLOOD GLUCOSE      POCT Blood Glucose.: 97 mg/dL (20 Sep 2023 11:47)  POCT Blood Glucose.: 93 mg/dL (20 Sep 2023 06:22)  POCT Blood Glucose.: 130 mg/dL (19 Sep 2023 21:57)  POCT Blood Glucose.: 126 mg/dL (19 Sep 2023 16:14)      Medications:  MEDICATIONS  (STANDING):  amLODIPine   Tablet 10 milliGRAM(s) Oral daily  bisacodyl Suppository 10 milliGRAM(s) Rectal every 24 hours  carvedilol 6.25 milliGRAM(s) Oral every 12 hours  chlorhexidine 2% Cloths 1 Application(s) Topical <User Schedule>  DAPTOmycin IVPB 800 milliGRAM(s) IV Intermittent every 24 hours  dextrose 5%. 1000 milliLiter(s) (50 mL/Hr) IV Continuous <Continuous>  dextrose 5%. 1000 milliLiter(s) (100 mL/Hr) IV Continuous <Continuous>  dextrose 50% Injectable 12.5 Gram(s) IV Push once  dextrose 50% Injectable 25 Gram(s) IV Push once  dextrose 50% Injectable 25 Gram(s) IV Push once  enoxaparin Injectable 40 milliGRAM(s) SubCutaneous every 24 hours  fluconAZOLE IVPB 200 milliGRAM(s) IV Intermittent every 24 hours  gabapentin 300 milliGRAM(s) Oral two times a day  glucagon  Injectable 1 milliGRAM(s) IntraMuscular once  influenza  Vaccine (HIGH DOSE) 0.7 milliLiter(s) IntraMuscular once  insulin lispro (ADMELOG) corrective regimen sliding scale   SubCutaneous three times a day before meals  insulin lispro (ADMELOG) corrective regimen sliding scale   SubCutaneous at bedtime  melatonin 5 milliGRAM(s) Oral at bedtime  meropenem  IVPB 1000 milliGRAM(s) IV Intermittent every 8 hours  pantoprazole  Injectable 40 milliGRAM(s) IV Push every 12 hours  polyethylene glycol 3350 17 Gram(s) Oral daily  potassium phosphate IVPB 15 milliMole(s) IV Intermittent once  sodium chloride 1 Gram(s) Oral two times a day  sucralfate suspension 1 Gram(s) Oral every 6 hours  urea Oral Powder 15 Gram(s) Oral daily    MEDICATIONS  (PRN):  dextrose Oral Gel 15 Gram(s) Oral once PRN Blood Glucose LESS THAN 70 milliGRAM(s)/deciliter  ondansetron Injectable 8 milliGRAM(s) IV Push every 6 hours PRN Nausea and/or Vomiting      Dosing Anthropometrics:   Height for BMI (FEET)	6 Feet  Height for BMI (INCHES)	0 Inch(s)  Height for BMI (CM)	182.88 Centimeter(s)  Weight for BMI (lbs)	195.1 lb  Weight for BMI (kg)	88.5 kg  Body Mass Index	              26.4  Ideal body weight: 178 pounds, pt is 110% of ideal body weight, current body weight to be utilized.     Weight Change: no new weights noted in electronic medical records: strongly recommend obtaining updated weight related to pt's ascites drainage planned for today (9/15/23); RD to continue to trend.     Nutrition Focused Physical Exam: Completed [ x on initial assessment on 9/11/23 ]  Not Pertinent [   ]  Pt's signs of muscle and fat loss remain. RD to monitor.     Estimated energy needs:   Estimated Energy Needs From (anton/kg)	30  Estimated Energy Needs To (anton/kg)	35  Estimated Energy Needs Calculated From (anton/kg)	2655  Estimated Energy Needs Calculated To (anton/kg)	3097    Estimated Protein Needs From (g/kg)	1.4  Estimated Protein Needs To (g/kg)	1.6  Estimated Protein Needs Calculated From (g/kg)	123.9  Estimated Protein Needs Calculated To (g/kg)	141.6    Estimated Fluid Needs From (ml/kg)	30  Estimated Fluid Needs To (ml/kg)	35  Estimated Fluid Needs Calculated From (ml/kg)	2655  Estimated Fluid Needs Calculated To (ml/kg)	3097  Other Calculations	Based on Standards of Care pt within % ideal body weight, thus actual body weight used for all calculations. Needs adjusted for advanced age, clinical status and malnutrition (repletion).    Subjective: 68M with PMHx of HTN, GERD and recent diagnosis of stage IV pancreatic cancer with numerous liver metastasis presented to [facility] and diagnosed with stage IV adenocarcinoma, s/p liver abscess drainage x 6 8/15, s/p ERCp with two stent placement as per records, getting paracentesis with possibly PCT drain placement, history of klebsiella bacteremia from outpatient basis , patient pending upgrade to ICU for monitoring, biliary drain placed, 2.5 L fluid drained which was clear and yellow, hypotensive during procedure, 2 FFPs given pre-op, biliary tree stents visualized and in place,  now s/p Tube check wnl and PTC internalized on 9/19 with worsening encephalopathy.    Pt assessed at bedside. Started on clear liquid diet this am. Intake not meeting nutrition needs at this time. Will recommend ensure clear TID until pts diet can be advanced. Briefly reviewed nutrition goals once diet is tolerated/advanced. RD to follow.       Previous Nutrition Diagnosis: Malnutrition of severe degree in the context of chronic illness/injury related to inadequate PO intakes secondary to pt's condition/clinical status as evidenced by moderate to severe muscle/fat loss per NFPE, ~29% wt loss in <1 year, </=75% [id number]    Active [ x ]  Resolved [   ]    Goal: Pt to consistently meet at least 75% of EEE via tolerated route that is consistent with goals of care and will no longer exhibit malnutrition during hospital stay    Recommendations:  1. Continue clear liquid diet, advance diet as medically feasible  >>Recommend add ensure clear TID  >>If predicted prolonged clear liquid diet recommend consider parenteral nutrition support   2. Encourage pt to meet nutritional needs as able   3. Monitor PO intake/diet advance, trend weights (weekly), monitor skin integrity, monitor labs (electrolytes, CMP), monitor GI function  4. Encourage adherence to diet education (reinforce as able)   5. Consider micronutrient supplementation (multivitamin, thiamine) once feasible  6. Pain and bowel regimen per team   7. Will continue to assess/honor preferences as able   8. Align nutrition interventions with goals of care at all times     Education: Reviewed nutrition goals     Risk Level: High [ x ] Moderate [   ] Low [   ].

## 2023-09-20 NOTE — BH CONSULTATION LIAISON PROGRESS NOTE - NSICDXBHTERTIARYDX_PSY_ALL_CORE
R/O MDD (major depressive disorder), recurrent episode   F33.9  R/O Depressive disorder due to separate medical condition   F06.30   R/O Other specified depressive disorder   F32.89

## 2023-09-20 NOTE — CONSULT NOTE ADULT - SUBJECTIVE AND OBJECTIVE BOX
***INCOMPLETE***  NEUROLOGY CONSULT    HPI:  68M with PMHx of HTN, GERD and recent diagnosis of stage IV pancreatic cancer with numerous liver metastasis who presents to [facility] from home for second opinion regarding treatmnet options. On interview patient says he is 'going through a rough time' but then speech afterwards is difficult to understand and garbled. Per nursing he has been acutely confused since 9/19/23 but when he was admitted to the hospital was AAOx4. Overnight he was agitated and pulled out his IV and condom cath. Remainder of history obtained from chart.     As per verbal report from outpatient oncologist, patient has had positive blood cultures for Klebsiella.       Medical History: HTN, GERD, pancreatic ca  Surgical History: L hip surgery  Medications: Coreg 12.5 qd, Pantoprazole 40 qd  Allergies: Denies, NKDA  Social History: 50+ pack year smoking history - quit 4 years ago. Denies alcohol or additional drug use. 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 1000 milliLiter(s) (100 mL/Hr) IV Continuous <Continuous>  dextrose 5%. 1000 milliLiter(s) (50 mL/Hr) IV Continuous <Continuous>  dextrose 50% Injectable 25 Gram(s) IV Push once  dextrose 50% Injectable 25 Gram(s) IV Push once  dextrose 50% Injectable 12.5 Gram(s) IV Push once  enoxaparin Injectable 40 milliGRAM(s) SubCutaneous every 24 hours  fluconAZOLE IVPB 200 milliGRAM(s) IV Intermittent every 24 hours  gabapentin 300 milliGRAM(s) Oral two times a day  glucagon  Injectable 1 milliGRAM(s) IntraMuscular once  influenza  Vaccine (HIGH DOSE) 0.7 milliLiter(s) IntraMuscular once  insulin lispro (ADMELOG) corrective regimen sliding scale   SubCutaneous three times a day before meals  insulin lispro (ADMELOG) corrective regimen sliding scale   SubCutaneous at bedtime  melatonin 5 milliGRAM(s) Oral at bedtime  meropenem  IVPB 1000 milliGRAM(s) IV Intermittent every 8 hours  pantoprazole  Injectable 40 milliGRAM(s) IV Push every 12 hours  polyethylene glycol 3350 17 Gram(s) Oral daily  potassium phosphate IVPB 15 milliMole(s) IV Intermittent once  sodium chloride 1 Gram(s) Oral two times a day  sucralfate suspension 1 Gram(s) Oral every 6 hours  urea Oral Powder 15 Gram(s) Oral daily    MEDICATIONS  (PRN):  dextrose Oral Gel 15 Gram(s) Oral once PRN Blood Glucose LESS THAN 70 milliGRAM(s)/deciliter  ondansetron Injectable 8 milliGRAM(s) IV Push every 6 hours PRN Nausea and/or Vomiting      FAMILY HISTORY:    SOCIAL HISTORY: negative for tobacco, alcohol, or ilicit drug use.    Allergies    No Known Allergies    Intolerances            Vital Signs Last 24 Hrs  T(C): 35.2 (20 Sep 2023 14:00), Max: 35.9 (20 Sep 2023 04:29)  T(F): 95.4 (20 Sep 2023 14:00), Max: 96.6 (20 Sep 2023 04:29)  HR: 70 (20 Sep 2023 10:50) (70 - 80)  BP: 124/64 (20 Sep 2023 10:50) (124/64 - 164/90)  BP(mean): 87 (20 Sep 2023 10:50) (87 - 119)  RR: 17 (20 Sep 2023 10:50) (17 - 20)  SpO2: 98% (20 Sep 2023 10:50) (97% - 100%)    Parameters below as of 20 Sep 2023 10:50  Patient On (Oxygen Delivery Method): room air          PHYSICAL EXAMINATION:  General: Comfortable, ill appearing, jaundiced  Neurologic:     -Mental Status: AAOx2 to self and time. Intermittently follows commands. Unable to assess repetition. Speech at times garbled with waxing/waning concentration     -Cranial Nerves:          II: Visual fields are full to confrontation.          III, IV, VI: EOMI without nystagmus. PERRL b/l          V:  Facial sensation V1-V3 equal and intact           VII: Face is symmetric with normal eye closure and smile          VIII: Hearing is bilaterally intact to finger rub          IX, X: Uvula is midline and soft palate rises symmetrically          XI: Head turning and shoulder shrug are intact.          XII: Tongue protrudes midline     -Motor: Normal bulk and tone. No involuntary movements (tremor, myoclonus, chorea, athetosis, dystonia)          Upper extremities: shoulder abduction, elbow flexion/extension, wrist flexion/extension, handgrip          Lower extremities: hip flexion, knee extension/flexion, plantar flexion, ankle dorsiflexion          No pronator drift. unable to assess rapid alternating movements intact and symmetric     -Sensation: Intact to light touch. Pain and temperature intact. Vibration sense intact.      -Coordination: pt did not follow command     -Reflexes: 2+ and symmetric at biceps, triceps, brachioradialis, patellar, ankles          Plantar reflexes downgoing bilaterally. Downgoing toes bilaterally      -Gait: narrow and steady      LABS:                        9.0    13.74 )-----------( 162      ( 20 Sep 2023 06:06 )             28.2     09-20    139  |  108  |  31<H>  ----------------------------<  110<H>  3.9   |  20<L>  |  1.08    Ca    8.4      20 Sep 2023 06:06  Phos  2.7     09-20  Mg     2.0     09-20    TPro  6.1  /  Alb  2.0<L>  /  TBili  11.2<H>  /  DBili  8.4<H>  /  AST  90<H>  /  ALT  30  /  AlkPhos  294<H>  09-20    Hemoglobin A1C:   Vitamin B12   PT/INR - ( 19 Sep 2023 05:17 )   PT: 17.4 sec;   INR: 1.55          PTT - ( 19 Sep 2023 05:17 )  PTT:35.1 sec  CAPILLARY BLOOD GLUCOSE      POCT Blood Glucose.: 97 mg/dL (20 Sep 2023 11:47)      Urinalysis Basic - ( 20 Sep 2023 06:06 )    Color: x / Appearance: x / SG: x / pH: x  Gluc: 110 mg/dL / Ketone: x  / Bili: x / Urobili: x   Blood: x / Protein: x / Nitrite: x   Leuk Esterase: x / RBC: x / WBC x   Sq Epi: x / Non Sq Epi: x / Bacteria: x            Microbiology:      RADIOLOGY, EKG AND ADDITIONAL TESTS: Reviewed.           ***INCOMPLETE***  NEUROLOGY CONSULT    HPI:  68M with PMHx of HTN, GERD and recent diagnosis of stage IV pancreatic cancer (July 2023) with numerous liver metastasis who presents to [facility] from home for second opinion regarding treatment options. On interview patient says he is 'going through a rough time' but then speech afterwards is difficult to understand and garbled. Per nursing he has been acutely confused since 9/19/23 but when he was admitted to the hospital was AAOx4. Overnight he was agitated and pulled out his IV and condom cath. Remainder of history obtained from chart as history cannot be obtained from the patient. He is currently on meropenem and daptomycin for ESBL Klebsiella and VRE hepatic abscess. He underwent paracentesis with IR on 9/12/23 (2.5L yellow ascites removed) and biliary drain check on 9/19/23.       Medical History: HTN, GERD, pancreatic ca  Social History: 50+ pack year smoking history - quit 4 years ago. Denies alcohol or additional drug use. Reports he worked in [employer]      HOSPITAL COURSE:    SUBJECTIVE:    REVIEW OF SYSTEMS: 12 point ROS negative    MEDICATIONS  Home Medications:  Coreg 12.5 mg oral tablet: 1 orally (19 Sep 2023 12:07)  Protonix 40 mg oral delayed release tablet: 1 orally (19 Sep 2023 12:07)    MEDICATIONS  (STANDING):  amLODIPine   Tablet 10 milliGRAM(s) Oral daily  bisacodyl Suppository 10 milliGRAM(s) Rectal every 24 hours  carvedilol 6.25 milliGRAM(s) Oral every 12 hours  chlorhexidine 2% Cloths 1 Application(s) Topical <User Schedule>  DAPTOmycin IVPB 800 milliGRAM(s) IV Intermittent every 24 hours  dextrose 5%. 1000 milliLiter(s) (100 mL/Hr) IV Continuous <Continuous>  dextrose 5%. 1000 milliLiter(s) (50 mL/Hr) IV Continuous <Continuous>  dextrose 50% Injectable 25 Gram(s) IV Push once  dextrose 50% Injectable 25 Gram(s) IV Push once  dextrose 50% Injectable 12.5 Gram(s) IV Push once  enoxaparin Injectable 40 milliGRAM(s) SubCutaneous every 24 hours  fluconAZOLE IVPB 200 milliGRAM(s) IV Intermittent every 24 hours  gabapentin 300 milliGRAM(s) Oral two times a day  glucagon  Injectable 1 milliGRAM(s) IntraMuscular once  influenza  Vaccine (HIGH DOSE) 0.7 milliLiter(s) IntraMuscular once  insulin lispro (ADMELOG) corrective regimen sliding scale   SubCutaneous three times a day before meals  insulin lispro (ADMELOG) corrective regimen sliding scale   SubCutaneous at bedtime  melatonin 5 milliGRAM(s) Oral at bedtime  meropenem  IVPB 1000 milliGRAM(s) IV Intermittent every 8 hours  pantoprazole  Injectable 40 milliGRAM(s) IV Push every 12 hours  polyethylene glycol 3350 17 Gram(s) Oral daily  potassium phosphate IVPB 15 milliMole(s) IV Intermittent once  sodium chloride 1 Gram(s) Oral two times a day  sucralfate suspension 1 Gram(s) Oral every 6 hours  urea Oral Powder 15 Gram(s) Oral daily    MEDICATIONS  (PRN):  dextrose Oral Gel 15 Gram(s) Oral once PRN Blood Glucose LESS THAN 70 milliGRAM(s)/deciliter  ondansetron Injectable 8 milliGRAM(s) IV Push every 6 hours PRN Nausea and/or Vomiting      FAMILY HISTORY:    SOCIAL HISTORY: negative for tobacco, alcohol, or ilicit drug use.    Allergies    No Known Allergies    Intolerances            Vital Signs Last 24 Hrs  T(C): 35.2 (20 Sep 2023 14:00), Max: 35.9 (20 Sep 2023 04:29)  T(F): 95.4 (20 Sep 2023 14:00), Max: 96.6 (20 Sep 2023 04:29)  HR: 70 (20 Sep 2023 10:50) (70 - 80)  BP: 124/64 (20 Sep 2023 10:50) (124/64 - 164/90)  BP(mean): 87 (20 Sep 2023 10:50) (87 - 119)  RR: 17 (20 Sep 2023 10:50) (17 - 20)  SpO2: 98% (20 Sep 2023 10:50) (97% - 100%)    Parameters below as of 20 Sep 2023 10:50  Patient On (Oxygen Delivery Method): room air          PHYSICAL EXAMINATION:  General: Comfortable, ill appearing, jaundiced  Neurologic:     -Mental Status: AAOx2 to self and time. Intermittently follows commands. Unable to assess repetition. Speech at times garbled with waxing/waning concentration     -Cranial Nerves:          II: Visual fields are full to confrontation.          III, IV, VI: EOMI without nystagmus. PERRL b/l          V:  Facial sensation V1-V3 equal and intact           VII: Face is symmetric with normal eye closure and smile          VIII: Hearing is bilaterally intact to finger rub          IX, X: Uvula is midline and soft palate rises symmetrically          XI: Head turning and shoulder shrug are intact.          XII: Tongue protrudes midline     -Motor: Normal bulk and tone. No involuntary movements (tremor, myoclonus, chorea, athetosis, dystonia)          Upper extremities: shoulder abduction, elbow flexion/extension, wrist flexion/extension, handgrip          Lower extremities: hip flexion, knee extension/flexion, plantar flexion, ankle dorsiflexion          No pronator drift. unable to assess rapid alternating movements intact and symmetric     -Sensation: Intact to light touch. Pain and temperature intact. Vibration sense intact.      -Coordination: pt did not follow command     -Reflexes: 2+ and symmetric at biceps, triceps, brachioradialis, patellar, ankles          Plantar reflexes downgoing bilaterally. Downgoing toes bilaterally      -Gait: narrow and steady      LABS:                        9.0    13.74 )-----------( 162      ( 20 Sep 2023 06:06 )             28.2     09-20    139  |  108  |  31<H>  ----------------------------<  110<H>  3.9   |  20<L>  |  1.08    Ca    8.4      20 Sep 2023 06:06  Phos  2.7     09-20  Mg     2.0     09-20    TPro  6.1  /  Alb  2.0<L>  /  TBili  11.2<H>  /  DBili  8.4<H>  /  AST  90<H>  /  ALT  30  /  AlkPhos  294<H>  09-20    Hemoglobin A1C:   Vitamin B12   PT/INR - ( 19 Sep 2023 05:17 )   PT: 17.4 sec;   INR: 1.55          PTT - ( 19 Sep 2023 05:17 )  PTT:35.1 sec  CAPILLARY BLOOD GLUCOSE      POCT Blood Glucose.: 97 mg/dL (20 Sep 2023 11:47)      Urinalysis Basic - ( 20 Sep 2023 06:06 )    Color: x / Appearance: x / SG: x / pH: x  Gluc: 110 mg/dL / Ketone: x  / Bili: x / Urobili: x   Blood: x / Protein: x / Nitrite: x   Leuk Esterase: x / RBC: x / WBC x   Sq Epi: x / Non Sq Epi: x / Bacteria: x            Microbiology:      RADIOLOGY, EKG AND ADDITIONAL TESTS: Reviewed.           ***INCOMPLETE***  NEUROLOGY CONSULT    HPI:  68M with PMHx of HTN, GERD and recent diagnosis of stage IV pancreatic cancer (July 2023) with numerous liver metastasis who presents to [facility] from home for second opinion regarding treatment options. On interview patient says he is 'going through a rough time' but then speech afterwards is difficult to understand and garbled. Per nursing he has been acutely confused since 9/19/23 but when he was admitted to the hospital was AAOx4. Overnight he was agitated and pulled out his IV and condom cath. Remainder of history obtained from chart as history cannot be obtained from the patient. He was diagnosed with stage IV pancreatic adenoma and since then has undergone ERCP with placement of biliary drain at OSH. His course was complicated by hospital admission for cholangitis and hepatic abscesses soon after. Since his discharge he has had decreased PO intake and increased fatigue. He is currently on meropenem and daptomycin for ESBL Klebsiella and VRE hepatic abscess. He underwent paracentesis with IR on 9/12/23 (2.5L yellow ascites removed) and biliary drain check on 9/19/23.       Medical History: HTN, GERD, pancreatic ca  Social History: 50+ pack year smoking history - quit 4 years ago. Denies alcohol or additional drug use. 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 1000 milliLiter(s) (100 mL/Hr) IV Continuous <Continuous>  dextrose 5%. 1000 milliLiter(s) (50 mL/Hr) IV Continuous <Continuous>  dextrose 50% Injectable 25 Gram(s) IV Push once  dextrose 50% Injectable 25 Gram(s) IV Push once  dextrose 50% Injectable 12.5 Gram(s) IV Push once  enoxaparin Injectable 40 milliGRAM(s) SubCutaneous every 24 hours  fluconAZOLE IVPB 200 milliGRAM(s) IV Intermittent every 24 hours  gabapentin 300 milliGRAM(s) Oral two times a day  glucagon  Injectable 1 milliGRAM(s) IntraMuscular once  influenza  Vaccine (HIGH DOSE) 0.7 milliLiter(s) IntraMuscular once  insulin lispro (ADMELOG) corrective regimen sliding scale   SubCutaneous three times a day before meals  insulin lispro (ADMELOG) corrective regimen sliding scale   SubCutaneous at bedtime  melatonin 5 milliGRAM(s) Oral at bedtime  meropenem  IVPB 1000 milliGRAM(s) IV Intermittent every 8 hours  pantoprazole  Injectable 40 milliGRAM(s) IV Push every 12 hours  polyethylene glycol 3350 17 Gram(s) Oral daily  potassium phosphate IVPB 15 milliMole(s) IV Intermittent once  sodium chloride 1 Gram(s) Oral two times a day  sucralfate suspension 1 Gram(s) Oral every 6 hours  urea Oral Powder 15 Gram(s) Oral daily    MEDICATIONS  (PRN):  dextrose Oral Gel 15 Gram(s) Oral once PRN Blood Glucose LESS THAN 70 milliGRAM(s)/deciliter  ondansetron Injectable 8 milliGRAM(s) IV Push every 6 hours PRN Nausea and/or Vomiting      FAMILY HISTORY:    SOCIAL HISTORY: negative for tobacco, alcohol, or ilicit drug use.    Allergies    No Known Allergies    Intolerances            Vital Signs Last 24 Hrs  T(C): 35.2 (20 Sep 2023 14:00), Max: 35.9 (20 Sep 2023 04:29)  T(F): 95.4 (20 Sep 2023 14:00), Max: 96.6 (20 Sep 2023 04:29)  HR: 70 (20 Sep 2023 10:50) (70 - 80)  BP: 124/64 (20 Sep 2023 10:50) (124/64 - 164/90)  BP(mean): 87 (20 Sep 2023 10:50) (87 - 119)  RR: 17 (20 Sep 2023 10:50) (17 - 20)  SpO2: 98% (20 Sep 2023 10:50) (97% - 100%)    Parameters below as of 20 Sep 2023 10:50  Patient On (Oxygen Delivery Method): room air          PHYSICAL EXAMINATION:  General: Comfortable, ill appearing, jaundiced  Neurologic:     -Mental Status: AAOx2 to self and time. Intermittently follows commands. Unable to assess repetition. Speech at times garbled with waxing/waning concentration     -Cranial Nerves:          II: Visual fields are full to confrontation.          III, IV, VI: EOMI without nystagmus. PERRL b/l          V:  Facial sensation V1-V3 equal and intact           VII: Face is symmetric with normal eye closure and smile          VIII: Hearing is bilaterally intact to finger rub          IX, X: Uvula is midline and soft palate rises symmetrically          XI: Head turning and shoulder shrug are intact.          XII: Tongue protrudes midline     -Motor: Normal bulk and tone. No involuntary movements (tremor, myoclonus, chorea, athetosis, dystonia)          Upper extremities: shoulder abduction, elbow flexion/extension, wrist flexion/extension, handgrip          Lower extremities: hip flexion, knee extension/flexion, plantar flexion, ankle dorsiflexion          No pronator drift. unable to assess rapid alternating movements intact and symmetric     -Sensation: Intact to light touch. Pain and temperature intact. Vibration sense intact.      -Coordination: pt did not follow command     -Reflexes: 2+ and symmetric at biceps, triceps, brachioradialis, patellar, ankles          Plantar reflexes downgoing bilaterally. Downgoing toes bilaterally      -Gait: narrow and steady      LABS:                        9.0    13.74 )-----------( 162      ( 20 Sep 2023 06:06 )             28.2     09-20    139  |  108  |  31<H>  ----------------------------<  110<H>  3.9   |  20<L>  |  1.08    Ca    8.4      20 Sep 2023 06:06  Phos  2.7     09-20  Mg     2.0     09-20    TPro  6.1  /  Alb  2.0<L>  /  TBili  11.2<H>  /  DBili  8.4<H>  /  AST  90<H>  /  ALT  30  /  AlkPhos  294<H>  09-20    Hemoglobin A1C:   Vitamin B12   PT/INR - ( 19 Sep 2023 05:17 )   PT: 17.4 sec;   INR: 1.55          PTT - ( 19 Sep 2023 05:17 )  PTT:35.1 sec  CAPILLARY BLOOD GLUCOSE      POCT Blood Glucose.: 97 mg/dL (20 Sep 2023 11:47)      Urinalysis Basic - ( 20 Sep 2023 06:06 )    Color: x / Appearance: x / SG: x / pH: x  Gluc: 110 mg/dL / Ketone: x  / Bili: x / Urobili: x   Blood: x / Protein: x / Nitrite: x   Leuk Esterase: x / RBC: x / WBC x   Sq Epi: x / Non Sq Epi: x / Bacteria: x            Microbiology:      RADIOLOGY, EKG AND ADDITIONAL TESTS: Reviewed.           ***INCOMPLETE***  NEUROLOGY CONSULT    HPI:  68M with PMHx of HTN, GERD and recent diagnosis of stage IV pancreatic cancer (July 2023) with numerous liver metastasis who presents to [facility] from home for second opinion regarding treatment options. On interview patient says he is 'going through a rough time' but then speech afterwards is difficult to understand and garbled. Per nursing he has been acutely confused since 9/19/23 but when he was admitted to the hospital was AAOx4. Overnight he was agitated and pulled out his IV and condom cath. Remainder of history obtained from chart as history cannot be obtained from the patient. He was diagnosed with stage IV pancreatic adenoma and since then has undergone ERCP with placement of biliary drain at OSH. His course was complicated by hospital admission for cholangitis and hepatic abscesses soon after. Since his discharge he has had decreased PO intake and increased fatigue. He is currently on meropenem and daptomycin for ESBL Klebsiella and VRE hepatic abscess. He underwent paracentesis with IR on 9/12/23 (2.5L yellow ascites removed) and biliary drain check on 9/19/23.       Medical History: HTN, GERD, pancreatic ca  Social History: 50+ pack year smoking history - quit 4 years ago. Denies alcohol or additional drug use. 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 1000 milliLiter(s) (100 mL/Hr) IV Continuous <Continuous>  dextrose 5%. 1000 milliLiter(s) (50 mL/Hr) IV Continuous <Continuous>  dextrose 50% Injectable 25 Gram(s) IV Push once  dextrose 50% Injectable 25 Gram(s) IV Push once  dextrose 50% Injectable 12.5 Gram(s) IV Push once  enoxaparin Injectable 40 milliGRAM(s) SubCutaneous every 24 hours  fluconAZOLE IVPB 200 milliGRAM(s) IV Intermittent every 24 hours  gabapentin 300 milliGRAM(s) Oral two times a day  glucagon  Injectable 1 milliGRAM(s) IntraMuscular once  influenza  Vaccine (HIGH DOSE) 0.7 milliLiter(s) IntraMuscular once  insulin lispro (ADMELOG) corrective regimen sliding scale   SubCutaneous three times a day before meals  insulin lispro (ADMELOG) corrective regimen sliding scale   SubCutaneous at bedtime  melatonin 5 milliGRAM(s) Oral at bedtime  meropenem  IVPB 1000 milliGRAM(s) IV Intermittent every 8 hours  pantoprazole  Injectable 40 milliGRAM(s) IV Push every 12 hours  polyethylene glycol 3350 17 Gram(s) Oral daily  potassium phosphate IVPB 15 milliMole(s) IV Intermittent once  sodium chloride 1 Gram(s) Oral two times a day  sucralfate suspension 1 Gram(s) Oral every 6 hours  urea Oral Powder 15 Gram(s) Oral daily    MEDICATIONS  (PRN):  dextrose Oral Gel 15 Gram(s) Oral once PRN Blood Glucose LESS THAN 70 milliGRAM(s)/deciliter  ondansetron Injectable 8 milliGRAM(s) IV Push every 6 hours PRN Nausea and/or Vomiting      FAMILY HISTORY:    SOCIAL HISTORY: negative for tobacco, alcohol, or ilicit drug use.    Allergies    No Known Allergies    Intolerances            Vital Signs Last 24 Hrs  T(C): 35.2 (20 Sep 2023 14:00), Max: 35.9 (20 Sep 2023 04:29)  T(F): 95.4 (20 Sep 2023 14:00), Max: 96.6 (20 Sep 2023 04:29)  HR: 70 (20 Sep 2023 10:50) (70 - 80)  BP: 124/64 (20 Sep 2023 10:50) (124/64 - 164/90)  BP(mean): 87 (20 Sep 2023 10:50) (87 - 119)  RR: 17 (20 Sep 2023 10:50) (17 - 20)  SpO2: 98% (20 Sep 2023 10:50) (97% - 100%)    Parameters below as of 20 Sep 2023 10:50  Patient On (Oxygen Delivery Method): room air          PHYSICAL EXAMINATION:  General: Comfortable, ill appearing, jaundiced  Neurologic:     -Mental Status: AAOx2 to self and time. Intermittently follows commands. Unable to assess repetition. Speech at times garbled with waxing/waning concentration     -Cranial Nerves:          II: Visual fields - unable to assess          III, IV, VI: unable to assess nystagmus at pt not tracking with eyes. PERRL b/l          V:  Facial sensation V1-V3 equal and intact           VII: Face is symmetric with normal eye closure and smile          VIII: Hearing is bilaterally intact to finger rub          IX, X: Uvula is midline and soft palate rises symmetrically          XI: Head turning and shoulder shrug are intact.          XII: Tongue protrudes midline     -Motor: Normal bulk and tone. No involuntary movements (tremor, myoclonus, chorea), no obvious asterixis          Upper extremities: shoulder abduction, elbow flexion/extension, wrist flexion/extension, handgrip          Lower extremities: hip flexion, knee extension/flexion, plantar flexion, ankle dorsiflexion          Unable to assess pronator drift. unable to assess rapid alternating movements intact and symmetric     -Sensation: Intact to light touch. Pain and temperature intact.     -Coordination: pt did not follow commands     -Reflexes: 2+ and symmetric at biceps, triceps, brachioradialis, patellar, ankles          Plantar reflexes downgoing bilaterally. Downgoing toes bilaterally     LABS:                        9.0    13.74 )-----------( 162      ( 20 Sep 2023 06:06 )             28.2     09-20    139  |  108  |  31<H>  ----------------------------<  110<H>  3.9   |  20<L>  |  1.08    Ca    8.4      20 Sep 2023 06:06  Phos  2.7     09-20  Mg     2.0     09-20    TPro  6.1  /  Alb  2.0<L>  /  TBili  11.2<H>  /  DBili  8.4<H>  /  AST  90<H>  /  ALT  30  /  AlkPhos  294<H>  09-20    Hemoglobin A1C:   Vitamin B12   PT/INR - ( 19 Sep 2023 05:17 )   PT: 17.4 sec;   INR: 1.55          PTT - ( 19 Sep 2023 05:17 )  PTT:35.1 sec  CAPILLARY BLOOD GLUCOSE      POCT Blood Glucose.: 97 mg/dL (20 Sep 2023 11:47)      Urinalysis Basic - ( 20 Sep 2023 06:06 )    Color: x / Appearance: x / SG: x / pH: x  Gluc: 110 mg/dL / Ketone: x  / Bili: x / Urobili: x   Blood: x / Protein: x / Nitrite: x   Leuk Esterase: x / RBC: x / WBC x   Sq Epi: x / Non Sq Epi: x / Bacteria: x            Microbiology:      RADIOLOGY, EKG AND ADDITIONAL TESTS: Reviewed.           NEUROLOGY CONSULT    HPI:  68M with PMHx of HTN, GERD and recent diagnosis of stage IV pancreatic cancer (July 2023) with numerous liver metastasis who presents to [facility] from home for second opinion regarding treatment options. On interview patient says he is 'going through a rough time' but then speech afterwards is difficult to understand and garbled. Per nursing he has been acutely confused since 9/19/23 but when he was admitted to the hospital was AAOx4. Overnight he was agitated and pulled out his IV and condom cath. Remainder of history obtained from chart as history cannot be obtained from the patient. He was diagnosed with stage IV pancreatic adenoma and since then has undergone ERCP with placement of biliary drain at OSH. His course was complicated by hospital admission for cholangitis and hepatic abscesses soon after. Since his discharge he has had decreased PO intake and increased fatigue. He is currently on meropenem and daptomycin for ESBL Klebsiella and VRE hepatic abscess. He underwent paracentesis with IR on 9/12/23 (2.5L yellow ascites removed) and biliary drain check on 9/19/23.       Medical History: HTN, GERD, pancreatic ca  Social History: 50+ pack year smoking history - quit 4 years ago. Denies alcohol or additional drug use. 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 1000 milliLiter(s) (100 mL/Hr) IV Continuous <Continuous>  dextrose 5%. 1000 milliLiter(s) (50 mL/Hr) IV Continuous <Continuous>  dextrose 50% Injectable 25 Gram(s) IV Push once  dextrose 50% Injectable 25 Gram(s) IV Push once  dextrose 50% Injectable 12.5 Gram(s) IV Push once  enoxaparin Injectable 40 milliGRAM(s) SubCutaneous every 24 hours  fluconAZOLE IVPB 200 milliGRAM(s) IV Intermittent every 24 hours  gabapentin 300 milliGRAM(s) Oral two times a day  glucagon  Injectable 1 milliGRAM(s) IntraMuscular once  influenza  Vaccine (HIGH DOSE) 0.7 milliLiter(s) IntraMuscular once  insulin lispro (ADMELOG) corrective regimen sliding scale   SubCutaneous three times a day before meals  insulin lispro (ADMELOG) corrective regimen sliding scale   SubCutaneous at bedtime  melatonin 5 milliGRAM(s) Oral at bedtime  meropenem  IVPB 1000 milliGRAM(s) IV Intermittent every 8 hours  pantoprazole  Injectable 40 milliGRAM(s) IV Push every 12 hours  polyethylene glycol 3350 17 Gram(s) Oral daily  potassium phosphate IVPB 15 milliMole(s) IV Intermittent once  sodium chloride 1 Gram(s) Oral two times a day  sucralfate suspension 1 Gram(s) Oral every 6 hours  urea Oral Powder 15 Gram(s) Oral daily    MEDICATIONS  (PRN):  dextrose Oral Gel 15 Gram(s) Oral once PRN Blood Glucose LESS THAN 70 milliGRAM(s)/deciliter  ondansetron Injectable 8 milliGRAM(s) IV Push every 6 hours PRN Nausea and/or Vomiting      FAMILY HISTORY:    SOCIAL HISTORY: negative for tobacco, alcohol, or ilicit drug use.    Allergies    No Known Allergies    Intolerances            Vital Signs Last 24 Hrs  T(C): 35.2 (20 Sep 2023 14:00), Max: 35.9 (20 Sep 2023 04:29)  T(F): 95.4 (20 Sep 2023 14:00), Max: 96.6 (20 Sep 2023 04:29)  HR: 70 (20 Sep 2023 10:50) (70 - 80)  BP: 124/64 (20 Sep 2023 10:50) (124/64 - 164/90)  BP(mean): 87 (20 Sep 2023 10:50) (87 - 119)  RR: 17 (20 Sep 2023 10:50) (17 - 20)  SpO2: 98% (20 Sep 2023 10:50) (97% - 100%)    Parameters below as of 20 Sep 2023 10:50  Patient On (Oxygen Delivery Method): room air          PHYSICAL EXAMINATION:  General: Comfortable, ill appearing, jaundiced  Neurologic:     -Mental Status: AAOx2 to self and time. Intermittently follows commands. Unable to assess repetition. Speech at times garbled with waxing/waning concentration     -Cranial Nerves:          II: Visual fields - unable to assess          III, IV, VI: unable to assess nystagmus at pt not tracking with eyes. PERRL b/l          V:  Facial sensation V1-V3 equal and intact           VII: Face is symmetric with normal eye closure and smile          VIII: Hearing is bilaterally intact to finger rub          IX, X: Uvula is midline and soft palate rises symmetrically          XI: Head turning and shoulder shrug are intact.          XII: Tongue protrudes midline     -Motor: Normal bulk and tone. No involuntary movements (tremor, myoclonus, chorea), no obvious asterixis          Upper extremities: shoulder abduction, elbow flexion/extension, wrist flexion/extension, handgrip          Lower extremities: hip flexion, knee extension/flexion, plantar flexion, ankle dorsiflexion          Unable to assess pronator drift. unable to assess rapid alternating movements intact and symmetric     -Sensation: Intact to light touch. Pain and temperature intact.     -Coordination: pt did not follow commands     -Reflexes: 2+ and symmetric at biceps, triceps, brachioradialis, patellar, ankles          Plantar reflexes downgoing bilaterally. Downgoing toes bilaterally     LABS:                        9.0    13.74 )-----------( 162      ( 20 Sep 2023 06:06 )             28.2     09-20    139  |  108  |  31<H>  ----------------------------<  110<H>  3.9   |  20<L>  |  1.08    Ca    8.4      20 Sep 2023 06:06  Phos  2.7     09-20  Mg     2.0     09-20    TPro  6.1  /  Alb  2.0<L>  /  TBili  11.2<H>  /  DBili  8.4<H>  /  AST  90<H>  /  ALT  30  /  AlkPhos  294<H>  09-20    Hemoglobin A1C:   Vitamin B12   PT/INR - ( 19 Sep 2023 05:17 )   PT: 17.4 sec;   INR: 1.55          PTT - ( 19 Sep 2023 05:17 )  PTT:35.1 sec  CAPILLARY BLOOD GLUCOSE      POCT Blood Glucose.: 97 mg/dL (20 Sep 2023 11:47)      Urinalysis Basic - ( 20 Sep 2023 06:06 )    Color: x / Appearance: x / SG: x / pH: x  Gluc: 110 mg/dL / Ketone: x  / Bili: x / Urobili: x   Blood: x / Protein: x / Nitrite: x   Leuk Esterase: x / RBC: x / WBC x   Sq Epi: x / Non Sq Epi: x / Bacteria: x            Microbiology:      RADIOLOGY, EKG AND ADDITIONAL TESTS: Reviewed.

## 2023-09-20 NOTE — CONSULT NOTE ADULT - ASSESSMENT
68M with PMHx of HTN, GERD and recent diagnosis of stage IV pancreatic cancer (July 2023) with numerous liver metastasis admitted for further evaluation. Course c/b by delirium and agitation since 9/19/23. Neurology consulted for AMS.     #Delirium  Appears 2/2 toxic metabolic encephalopathy given acute change with no FND on exam. CTH negative for infarct on 9/20/23 although with motion artifact. Labs s/f leukocytosis and anemia, electrolytes WNL. Ammonia level not elevated. Bili and ALP elevated, similar to previous. Currently on meropenem, daptomycin, and fluconazole to cover ESBL Klebsiella, VRE, and candida found in biliary fluid. Received versed and propofol during biliary drain check procedure on 9/19/23.     Recommendations:  - VEEG to r/o subclinical seizures    Discussed with neurology senior Dr. Mcclendon. Recommendations not final until attending attestation present.  68M with PMHx of HTN, GERD and recent diagnosis of stage IV pancreatic cancer (July 2023) with numerous liver metastasis admitted for further evaluation. Course c/b by delirium and agitation since 9/19/23. Neurology consulted for AMS.     #Delirium  Appears 2/2 toxic metabolic encephalopathy given acute change with no FND on exam. Possibly related to ongoing infection. CTH negative for infarct on 9/20/23 although with motion artifact. Labs s/f leukocytosis and anemia, electrolytes WNL. Ammonia level not elevated. Bili and ALP elevated, similar to previous. Currently on meropenem, daptomycin, and fluconazole to cover ESBL Klebsiella, VRE, and candida found in biliary fluid. Received versed and propofol during biliary drain check procedure on 9/19/23 but patient had become delirious with hallucinations prior to this.     Recommendations:  - VEEG to r/o subclinical seizures    Discussed with neurology senior Dr. Mcclendon. Recommendations not final until attending attestation present.  68M with PMHx of HTN, GERD and recent diagnosis of stage IV pancreatic cancer (July 2023) with numerous liver metastasis admitted for further evaluation. Course c/b by delirium and agitation since 9/19/23. Neurology consulted for AMS.     #Delirium  Appears 2/2 metabolic encephalopathy given ongoing hepatic abscesses with no FND on exam. CTH non con negative for infarct on 9/20/23 although with motion artifact. Labs s/f leukocytosis and anemia, electrolytes WNL. Ammonia level not elevated. Bili and ALP elevated, similar to previous. Currently on meropenem, daptomycin, and fluconazole to cover ESBL Klebsiella, VRE, and candida found in biliary fluid. Received versed and propofol during biliary drain check procedure on 9/19/23 but patient had become delirious with hallucinations prior to this.     Recommendations:  - continued management of infection and supportive medical care per primary team  - MR brain w/wo contrast  - if evidence of seizure activity, will consider VEEG  - if MR brain is unrevealing and encephalopathy persists despite treatment of infection may consider LP    Discussed with neurology senior Dr. Mcclendon and attending Dr. Park. Recommendations not final until attending attestation present.

## 2023-09-20 NOTE — PROGRESS NOTE ADULT - ASSESSMENT
68M with PMHx of HTN, GERD and recent diagnosis of stage IV pancreatic cancer with numerous liver metastasis presented to [facility] and diagnosed with stage IV adenocarcinoma, s/p liver abscess drainage x 6 8/15, s/p ERCp with two stent placement as per records, getting paracentesis with possibly PCT drain placement, history of klebsiella bacteremia from outpatient basis , patient pending upgrade to ICU for monitoring, biliary drain placed, 2.5 L fluid drained which was clear and yellow, hypotensive during procedure, 2 FFPs given pre-op, biliary tree stents visualized and in place,  now s/p Tube check wnl and PTC internalized on 9/19 with worsening encephalopathy.    Encephalopathy - toxic vs. metabolic vs. infectious vs. hepatic vs. hypertensive vs. hypoxic vs. uremic vs. vascular.  Unlikely to be due to infection, metabolic or hepatic as electrolytes have been wnl with normal renal function and normal ammonia levels. Patient also has been on numerous antibiotics and afebrile. At this time, we would recommend getting a CT scan of the Head to r/o any anatomical or cerebrovascular etiologies that may be contributing to his AMS. We would recommend holding any sedating agents that could worsen his AMS. Follow up with Psych recs and after CT, consider getting Neurology consult. Continue with regular neuro checks.    Mild euvolemic hyponatremia which did not auto correcting due to poor oral intake and liver disease - resolved on BID Salt tablets, Urea daily; Isotonic fluids can be continued for nutrition status, if poor nutrition continues recommend dietitian f/u and alternative forms of nutrition ( parenteral / NGT )   Hypoalbuminemia can lead to worsening third spacing, would recommend IV albumin infusion as needed  PRN FFPS as needed pre op for elevated coag levels   IR s/p paracentesis with PTC drain placement, bilirubin continues to be elevated - will get tube check today  acute kidney injury on CKD - resolved   peritoneal cultures / blood culture - NGTD   Bile culture -VRE, klebsiella, citrobacter, candida   ID evaluated - merrem / dapto, also gave reccs for PO meds for discharge- quinolone / zyvox    IVF diet as per surgery   normocytic anemia s/p PRBCs transfusions, HB stable   hemodynamics are stable after PRBC / fluid resuscitation - monitor for orthostasis   dvt ppx as per surgery   HTN- continue on coreg, BP remained uncontrolled added on norvasc 5 mg daily, which was increased to 10mg for better control    Thank you for this consultation and we will continue to follow.

## 2023-09-20 NOTE — PROGRESS NOTE ADULT - SUBJECTIVE AND OBJECTIVE BOX
SUBJECTIVE: Pt seen and examined at bedside with chief. Pt is lying comfortably in bed but is not oriented.    MEDICATIONS  (STANDING):  amLODIPine   Tablet 10 milliGRAM(s) Oral daily  bisacodyl Suppository 10 milliGRAM(s) Rectal every 24 hours  carvedilol 6.25 milliGRAM(s) Oral every 12 hours  chlorhexidine 2% Cloths 1 Application(s) Topical <User Schedule>  DAPTOmycin IVPB 800 milliGRAM(s) IV Intermittent every 24 hours  dextrose 5%. 1000 milliLiter(s) (50 mL/Hr) IV Continuous <Continuous>  dextrose 5%. 1000 milliLiter(s) (100 mL/Hr) IV Continuous <Continuous>  dextrose 50% Injectable 12.5 Gram(s) IV Push once  dextrose 50% Injectable 25 Gram(s) IV Push once  dextrose 50% Injectable 25 Gram(s) IV Push once  enoxaparin Injectable 40 milliGRAM(s) SubCutaneous every 24 hours  fluconAZOLE IVPB 200 milliGRAM(s) IV Intermittent every 24 hours  gabapentin 300 milliGRAM(s) Oral two times a day  glucagon  Injectable 1 milliGRAM(s) IntraMuscular once  influenza  Vaccine (HIGH DOSE) 0.7 milliLiter(s) IntraMuscular once  insulin lispro (ADMELOG) corrective regimen sliding scale   SubCutaneous three times a day before meals  insulin lispro (ADMELOG) corrective regimen sliding scale   SubCutaneous at bedtime  melatonin 5 milliGRAM(s) Oral at bedtime  meropenem  IVPB 1000 milliGRAM(s) IV Intermittent every 8 hours  pantoprazole  Injectable 40 milliGRAM(s) IV Push every 12 hours  polyethylene glycol 3350 17 Gram(s) Oral daily  sodium chloride 1 Gram(s) Oral two times a day  sucralfate suspension 1 Gram(s) Oral every 6 hours  urea Oral Powder 15 Gram(s) Oral daily    MEDICATIONS  (PRN):  dextrose Oral Gel 15 Gram(s) Oral once PRN Blood Glucose LESS THAN 70 milliGRAM(s)/deciliter  ondansetron Injectable 8 milliGRAM(s) IV Push every 6 hours PRN Nausea and/or Vomiting      Vital Signs Last 24 Hrs  T(C): 35.9 (20 Sep 2023 04:29), Max: 36.1 (19 Sep 2023 13:52)  T(F): 96.6 (20 Sep 2023 04:29), Max: 96.6 (20 Sep 2023 04:29)  HR: 74 (20 Sep 2023 04:07) (70 - 80)  BP: 130/74 (20 Sep 2023 04:07) (128/93 - 164/90)  BP(mean): 95 (20 Sep 2023 04:07) (95 - 119)  RR: 18 (20 Sep 2023 04:07) (17 - 20)  SpO2: 98% (20 Sep 2023 04:07) (94% - 100%)    Parameters below as of 20 Sep 2023 04:07  Patient On (Oxygen Delivery Method): room air        PHYSICAL EXAM:      Constitutional: Awake but no oriented    Respiratory: non labored breathing, no respiratory distress    Cardiovascular: NSR, RRR    Gastrointestinal: Soft ND, NT. hepatic drain with minimal bilious output. PTC is capped.    Genitourinary: Voiding, incontinent    Extremities: (-) edema                  I&O's Detail    19 Sep 2023 07:01  -  20 Sep 2023 07:00  --------------------------------------------------------  IN:    dextrose 5% + sodium chloride 0.45%: 1080 mL    Oral Fluid: 20 mL  Total IN: 1100 mL    OUT:    Drain (mL): 155 mL    Drain (mL): 10 mL    Emesis (mL): 0 mL    Voided (mL): 300 mL  Total OUT: 465 mL    Total NET: 635 mL          LABS:                        9.0    13.74 )-----------( 162      ( 20 Sep 2023 06:06 )             28.2     09-20    139  |  108  |  31<H>  ----------------------------<  110<H>  3.9   |  20<L>  |  1.08    Ca    8.4      20 Sep 2023 06:06  Phos  2.7     09-20  Mg     2.0     09-20    TPro  6.1  /  Alb  2.0<L>  /  TBili  11.2<H>  /  DBili  8.4<H>  /  AST  90<H>  /  ALT  30  /  AlkPhos  294<H>  09-20    PT/INR - ( 19 Sep 2023 05:17 )   PT: 17.4 sec;   INR: 1.55          PTT - ( 19 Sep 2023 05:17 )  PTT:35.1 sec  Urinalysis Basic - ( 20 Sep 2023 06:06 )    Color: x / Appearance: x / SG: x / pH: x  Gluc: 110 mg/dL / Ketone: x  / Bili: x / Urobili: x   Blood: x / Protein: x / Nitrite: x   Leuk Esterase: x / RBC: x / WBC x   Sq Epi: x / Non Sq Epi: x / Bacteria: x        RADIOLOGY & ADDITIONAL STUDIES:

## 2023-09-20 NOTE — PROGRESS NOTE ADULT - ASSESSMENT
68M with PMHx of HTN, GERD and recent diagnosis of stage IV pancreatic cancer with numerous liver metastasis who presents to [facility] from home for second opinion regarding treatmnet options and progressive fatigue and weakness, as well as anorexia 2/2 poor appetite. Now s/p PTC placement and drainage of 2.5L of clear, yellow ascites (9/12) now s/p Tube check wnl and PTC internalized on 9/19.    CLD ADAT/IVF  Pain/nausea control PRN  Miralax bowel reg  IV meropenem (9/15-) and daptomycin (9/15-) fluconazole (9/16-)  Coreg 6.25mg bid  PPI BID  SCDs/IS/OOB  R posterior IR drain (hepatic abscess)  Anterior PTC drain  Dispo: PT recs MARIE, but pt refused

## 2023-09-20 NOTE — BH CONSULTATION LIAISON PROGRESS NOTE - NSBHASSESSMENTFT_PSY_ALL_CORE
67 yo male, with PPH of "depression" 15-20 years prior, now with PMH HTN, GERD and recent diagnosis of stage IV pancreatic cancer with numerous liver metastasis, presented to [facility] and diagnosed with stage IV adenocarcinoma, s/p liver abscess drainage x 6 8/15, s/p ERCp with two stent placement as per records, getting paracentesis with possibly PCT drain placement, history of klebsiella bacteremia from outpatient basis. Psychiatry initially consulted for recommendations for anxiety/depression now following up for agitation/confusion, found to be delirious with disorientation and AH.  On re-assessment today pt presents more altered, unable to participate in an interview. Per the nursing staff, pt has been agitated,   -start gabapentin 300mg po q12h for treatment of agitation associated with delirium   -hold for now mirtazapine  -consider nursing observation   -for breakthrough agitation, can use olanzapine 2.5mg po/im q6h prn; monitor for qtc prolongation  - Delirium Precautions:  Orientation protocols: Provision of clocks, calendars, windows with outside views and frequent verbal reorientation of patients.  Avoiding and/or monitoring the use of problematic medications: Avoid benzodiazepines. Use caution when prescribing opioids, dihydropyridines, anticholinergics, antihistamines.  Cognitive stimulation: Regular visits from family and friends. Avoid overstimulation (particularly at night)Facilitation of physiologic sleep: Nursing and medical procedures, including the administration ofmedications, should be avoided during sleeping hours when possible. Night-time noise should be reduced.  Early mobilization and minimized use of physical restraints for patients with limited mobility.   Visual and hearing aids for patients with these impairments  -CL to follow  69 yo male, with PPH of "depression" 15-20 years prior, now with PMH HTN, GERD and recent diagnosis of stage IV pancreatic cancer with numerous liver metastasis, presented to [facility] and diagnosed with stage IV adenocarcinoma, s/p liver abscess drainage x 6 8/15, s/p ERCp with two stent placement as per records, getting paracentesis with possibly PCT drain placement, history of klebsiella bacteremia from outpatient basis. Psychiatry initially consulted for recommendations for anxiety/depression now following up for agitation/confusion, found to be delirious with disorientation and AH.  On re-assessment today pt presents more altered, unable to participate in an interview. Per the nursing staff, pt has been agitated, pulling IV lines and tubes during the night, +AH.  Chart review shows CT without any acute changes, high wbc, bilirubin, alk phos.   dx Delirium due to underlying medical condition     -consider repeating the UA, ammonia level, further imaging, neurology consult, EEG monitoring    -continue gabapentin 300mg po q12h for treatment of agitation associated with delirium   -consider starting standing olanzapine 2.5mg qhs for agitation if pt does not respond to verbal redirection; if olanzapine is started, d/c gabapentin as both agents cause sedation   -for breakthrough agitation, can use olanzapine 2.5mg po/im q6h prn; monitor for qtc prolongation    - Delirium Precautions:  Orientation protocols: Provision of clocks, calendars, windows with outside views and frequent verbal reorientation of patients.  Avoiding and/or monitoring the use of problematic medications: Avoid benzodiazepines. Use caution when prescribing opioids, dihydropyridines, anticholinergics, antihistamines.  Cognitive stimulation: Regular visits from family and friends. Avoid overstimulation (particularly at night)Facilitation of physiologic sleep: Nursing and medical procedures, including the administration ofmedications, should be avoided during sleeping hours when possible. Night-time noise should be reduced.  Early mobilization and minimized use of physical restraints for patients with limited mobility.   Visual and hearing aids for patients with these impairments  -CL to follow  69 yo male, with PPH of "depression" 15-20 years prior, now with PMH HTN, GERD and recent diagnosis of stage IV pancreatic cancer with numerous liver metastasis, presented to [facility] and diagnosed with stage IV adenocarcinoma, s/p liver abscess drainage x 6 8/15, s/p ERCp with two stent placement as per records, getting paracentesis with possibly PCT drain placement, history of klebsiella bacteremia from outpatient basis. Psychiatry initially consulted for recommendations for anxiety/depression now following up for agitation/confusion, found to be delirious with disorientation and AH.  On re-assessment today pt presents more altered, unable to participate in an interview. Per the nursing staff, pt has been agitated, pulling IV lines and tubes during the night, +AH.  Chart review shows CT without any acute changes, high wbc, bilirubin, alk phos.   dx Delirium due to underlying medical condition   -medical management for delirium as per the primary team,  -consider repeating the UA, ammonia level, further imaging, neurology consult, EEG monitoring    -continue gabapentin 300mg po q12h for treatment of agitation associated with delirium   -consider starting standing olanzapine 2.5mg qhs for agitation if pt does not respond to verbal redirection; if olanzapine is started, d/c gabapentin as both agents cause sedation   -for breakthrough agitation, can use olanzapine 2.5mg po/im q6h prn; monitor for qtc prolongation    - Delirium Precautions:  Orientation protocols: Provision of clocks, calendars, windows with outside views and frequent verbal reorientation of patients.  Avoiding and/or monitoring the use of problematic medications: Avoid benzodiazepines. Use caution when prescribing opioids, dihydropyridines, anticholinergics, antihistamines.  Cognitive stimulation: Regular visits from family and friends. Avoid overstimulation (particularly at night)Facilitation of physiologic sleep: Nursing and medical procedures, including the administration ofmedications, should be avoided during sleeping hours when possible. Night-time noise should be reduced.  Early mobilization and minimized use of physical restraints for patients with limited mobility.   Visual and hearing aids for patients with these impairments  -CL to follow

## 2023-09-20 NOTE — PROGRESS NOTE ADULT - ASSESSMENT
Impression     History of pancreatic adenocarcinoma complicated by extensive bilobar hepatic   metastatic disease status post biliary stenting x 2 presents with MDR hepatic   subcapsular abscess status post PTC drain - cultures positive for ESBL Klebsiella  Klebsiella pneumoniae as well as Enterococcus faecium (VRE) - unclear etiology   of encephalopathy however differential likely post procedural physiologic versus   multifactorial - lower suspicion related to meropenem receipt     Plan    1. Continue meropenem 1000 mg IV three times daily   2. Continue daptomycin 800 mg IV daily   3. Continue fluconazole 200 mg IV daily as long as inpatient   4. Consider neurology evaluation   5. Upon discharge transition antibiotics to linezolid 600 mg by mouth twice   daily and moxifloxacin 400 mg by mouth daily x 14 days - will avoid further  fluconazole given risk of qTc prolongation with fluoroquinolone     Thank you kindly for this referral.  The patient has been discussed with   surgery service earlier.    Alex Umanzor MD  [phone]

## 2023-09-21 NOTE — BH CONSULTATION LIAISON PROGRESS NOTE - CURRENT MEDICATION
MEDICATIONS  (STANDING):  amLODIPine   Tablet 10 milliGRAM(s) Oral daily  bisacodyl Suppository 10 milliGRAM(s) Rectal every 24 hours  carvedilol 6.25 milliGRAM(s) Oral every 12 hours  chlorhexidine 2% Cloths 1 Application(s) Topical <User Schedule>  DAPTOmycin IVPB 800 milliGRAM(s) IV Intermittent every 24 hours  dextrose 5%. 1000 milliLiter(s) (100 mL/Hr) IV Continuous <Continuous>  dextrose 5%. 1000 milliLiter(s) (50 mL/Hr) IV Continuous <Continuous>  dextrose 50% Injectable 25 Gram(s) IV Push once  dextrose 50% Injectable 12.5 Gram(s) IV Push once  dextrose 50% Injectable 25 Gram(s) IV Push once  enoxaparin Injectable 40 milliGRAM(s) SubCutaneous every 24 hours  fluconAZOLE IVPB 200 milliGRAM(s) IV Intermittent every 24 hours  gabapentin 300 milliGRAM(s) Oral two times a day  glucagon  Injectable 1 milliGRAM(s) IntraMuscular once  influenza  Vaccine (HIGH DOSE) 0.7 milliLiter(s) IntraMuscular once  insulin lispro (ADMELOG) corrective regimen sliding scale   SubCutaneous three times a day before meals  insulin lispro (ADMELOG) corrective regimen sliding scale   SubCutaneous at bedtime  lactulose Retention Enema 200 Gram(s) Rectal once  melatonin 5 milliGRAM(s) Oral at bedtime  meropenem  IVPB 1000 milliGRAM(s) IV Intermittent every 8 hours  pantoprazole  Injectable 40 milliGRAM(s) IV Push every 12 hours  polyethylene glycol 3350 17 Gram(s) Oral daily  potassium phosphate IVPB 15 milliMole(s) IV Intermittent once  sodium chloride 1 Gram(s) Oral two times a day  sucralfate suspension 1 Gram(s) Oral every 6 hours  urea Oral Powder 15 Gram(s) Oral daily    MEDICATIONS  (PRN):  dextrose Oral Gel 15 Gram(s) Oral once PRN Blood Glucose LESS THAN 70 milliGRAM(s)/deciliter  ondansetron Injectable 8 milliGRAM(s) IV Push every 6 hours PRN Nausea and/or Vomiting  
MEDICATIONS  (STANDING):  amLODIPine   Tablet 10 milliGRAM(s) Oral daily  bisacodyl Suppository 10 milliGRAM(s) Rectal every 24 hours  carvedilol 6.25 milliGRAM(s) Oral every 12 hours  chlorhexidine 2% Cloths 1 Application(s) Topical <User Schedule>  dextrose 5%. 1000 milliLiter(s) (50 mL/Hr) IV Continuous <Continuous>  dextrose 5%. 1000 milliLiter(s) (100 mL/Hr) IV Continuous <Continuous>  dextrose 50% Injectable 12.5 Gram(s) IV Push once  dextrose 50% Injectable 25 Gram(s) IV Push once  dextrose 50% Injectable 25 Gram(s) IV Push once  enoxaparin Injectable 40 milliGRAM(s) SubCutaneous every 24 hours  fluconAZOLE IVPB 200 milliGRAM(s) IV Intermittent every 24 hours  gabapentin 300 milliGRAM(s) Oral two times a day  glucagon  Injectable 1 milliGRAM(s) IntraMuscular once  influenza  Vaccine (HIGH DOSE) 0.7 milliLiter(s) IntraMuscular once  insulin lispro (ADMELOG) corrective regimen sliding scale   SubCutaneous three times a day before meals  insulin lispro (ADMELOG) corrective regimen sliding scale   SubCutaneous at bedtime  magnesium sulfate  IVPB 1 Gram(s) IV Intermittent once  melatonin 5 milliGRAM(s) Oral at bedtime  meropenem  IVPB 1000 milliGRAM(s) IV Intermittent every 8 hours  pantoprazole  Injectable 40 milliGRAM(s) IV Push every 12 hours  polyethylene glycol 3350 17 Gram(s) Oral daily  sodium chloride 1 Gram(s) Oral two times a day  sucralfate suspension 1 Gram(s) Oral every 6 hours  urea Oral Powder 15 Gram(s) Oral daily    MEDICATIONS  (PRN):  dextrose Oral Gel 15 Gram(s) Oral once PRN Blood Glucose LESS THAN 70 milliGRAM(s)/deciliter  ondansetron Injectable 8 milliGRAM(s) IV Push every 6 hours PRN Nausea and/or Vomiting  
MEDICATIONS  (STANDING):  bisacodyl Suppository 10 milliGRAM(s) Rectal every 24 hours  carvedilol 6.25 milliGRAM(s) Oral every 12 hours  chlorhexidine 2% Cloths 1 Application(s) Topical <User Schedule>  DAPTOmycin IVPB 800 milliGRAM(s) IV Intermittent every 24 hours  dextrose 5% + sodium chloride 0.9%. 1000 milliLiter(s) (120 mL/Hr) IV Continuous <Continuous>  dextrose 5%. 1000 milliLiter(s) (50 mL/Hr) IV Continuous <Continuous>  dextrose 5%. 1000 milliLiter(s) (100 mL/Hr) IV Continuous <Continuous>  dextrose 50% Injectable 25 Gram(s) IV Push once  dextrose 50% Injectable 12.5 Gram(s) IV Push once  dextrose 50% Injectable 25 Gram(s) IV Push once  glucagon  Injectable 1 milliGRAM(s) IntraMuscular once  influenza  Vaccine (HIGH DOSE) 0.7 milliLiter(s) IntraMuscular once  insulin lispro (ADMELOG) corrective regimen sliding scale   SubCutaneous at bedtime  insulin lispro (ADMELOG) corrective regimen sliding scale   SubCutaneous three times a day before meals  melatonin 5 milliGRAM(s) Oral at bedtime  meropenem  IVPB 1000 milliGRAM(s) IV Intermittent every 8 hours  mirtazapine 7.5 milliGRAM(s) Oral at bedtime  pantoprazole  Injectable 40 milliGRAM(s) IV Push every 12 hours  polyethylene glycol 3350 17 Gram(s) Oral daily  sodium chloride 1 Gram(s) Oral two times a day  sucralfate suspension 1 Gram(s) Oral every 6 hours  urea Oral Powder 15 Gram(s) Oral daily    MEDICATIONS  (PRN):  dextrose Oral Gel 15 Gram(s) Oral once PRN Blood Glucose LESS THAN 70 milliGRAM(s)/deciliter  HYDROmorphone   Tablet 2 milliGRAM(s) Oral every 3 hours PRN Moderate Pain (4 - 6)  ondansetron Injectable 8 milliGRAM(s) IV Push every 6 hours PRN Nausea and/or Vomiting  
MEDICATIONS  (STANDING):  bisacodyl Suppository 10 milliGRAM(s) Rectal every 24 hours  carvedilol 6.25 milliGRAM(s) Oral every 12 hours  chlorhexidine 2% Cloths 1 Application(s) Topical <User Schedule>  DAPTOmycin IVPB 800 milliGRAM(s) IV Intermittent every 24 hours  dextrose 5% + sodium chloride 0.45%. 1000 milliLiter(s) (120 mL/Hr) IV Continuous <Continuous>  dextrose 5%. 1000 milliLiter(s) (50 mL/Hr) IV Continuous <Continuous>  dextrose 5%. 1000 milliLiter(s) (100 mL/Hr) IV Continuous <Continuous>  dextrose 50% Injectable 25 Gram(s) IV Push once  dextrose 50% Injectable 25 Gram(s) IV Push once  dextrose 50% Injectable 12.5 Gram(s) IV Push once  enoxaparin Injectable 40 milliGRAM(s) SubCutaneous every 24 hours  fluconAZOLE IVPB 200 milliGRAM(s) IV Intermittent every 24 hours  glucagon  Injectable 1 milliGRAM(s) IntraMuscular once  influenza  Vaccine (HIGH DOSE) 0.7 milliLiter(s) IntraMuscular once  insulin lispro (ADMELOG) corrective regimen sliding scale   SubCutaneous three times a day before meals  insulin lispro (ADMELOG) corrective regimen sliding scale   SubCutaneous at bedtime  melatonin 5 milliGRAM(s) Oral at bedtime  meropenem  IVPB 1000 milliGRAM(s) IV Intermittent every 8 hours  mirtazapine 7.5 milliGRAM(s) Oral at bedtime  pantoprazole  Injectable 40 milliGRAM(s) IV Push every 12 hours  polyethylene glycol 3350 17 Gram(s) Oral daily  potassium chloride  10 mEq/100 mL IVPB 10 milliEquivalent(s) IV Intermittent every 4 hours  sodium chloride 1 Gram(s) Oral two times a day  sucralfate suspension 1 Gram(s) Oral every 6 hours  urea Oral Powder 15 Gram(s) Oral daily    MEDICATIONS  (PRN):  dextrose Oral Gel 15 Gram(s) Oral once PRN Blood Glucose LESS THAN 70 milliGRAM(s)/deciliter  ondansetron Injectable 8 milliGRAM(s) IV Push every 6 hours PRN Nausea and/or Vomiting

## 2023-09-21 NOTE — PROGRESS NOTE ADULT - ASSESSMENT
68M with PMHx of HTN, GERD and recent diagnosis of stage IV pancreatic cancer (July 2023) with numerous liver metastasis admitted for further evaluation. Course c/b by delirium and agitation since 9/19/23. Neurology consulted for AMS.     #Metabolic encephalopathy   no FND on exam; asterixis c/w metabolic process. Mental status showing improvement today on exam. CTH non con negative for infarct on 9/20/23 although with motion artifact. Labs s/f leukocytosis and anemia, electrolytes WNL. Ammonia level not elevated. Bili and ALP elevated, similar to previous. Currently on meropenem, daptomycin, and fluconazole to cover ESBL Klebsiella, VRE, and candida found in biliary fluid.    Recommendations:  - continued management of infection and supportive medical care per primary team  - MR brain w/wo contrast  - if evidence of seizure activity, recommend VEEG, please page neuro  - if febrile and patient clinically declines with no other source of infection consider LP  - can repeat ammonia level    Discussed with neurology senior Dr. Mcclendon and attending Dr. Park. Recommendations not final until attending attestation present.

## 2023-09-21 NOTE — BH CONSULTATION LIAISON PROGRESS NOTE - NSBHTIMEACTIVITIESPERFORMED_PSY_A_CORE
Review of chart, labs, tests and other pertinent documents, interview, collaterals, decision-making, psychoeducation, coordination of care.  The time documented excludes time spent on separately reportable services/teaching during the encounter.  

## 2023-09-21 NOTE — PROGRESS NOTE ADULT - ASSESSMENT
68M with PMHx of HTN, GERD and recent diagnosis of stage IV pancreatic cancer with numerous liver metastasis presented to [facility] and diagnosed with stage IV adenocarcinoma, s/p liver abscess drainage x 6 8/15, s/p ERCp with two stent placement as per records, getting paracentesis with possibly PCT drain placement, history of klebsiella bacteremia from outpatient basis , patient pending upgrade to ICU for monitoring, biliary drain placed, 2.5 L fluid drained which was clear and yellow, hypotensive during procedure, 2 FFPs given pre-op, biliary tree stents visualized and in place,  now s/p Tube check wnl and PTC internalized on 9/19 with worsening encephalopathy.    Encephalopathy - toxic vs. metabolic vs. infectious vs. hepatic vs. hypertensive vs. hypoxic vs. uremic vs. vascular.  Neurology consulted and it is believed to be metabolic in nature. CT Head ruled out CVA or any other overt etiology. MRI Brain ordered - will follow up. Unlikely to be infectious as  patient also has been on numerous antibiotics and afebrile. Ammonia level was normal although after Lactulose, patient's mental status did improve. We would recommend holding any sedating agents that could worsen his AMS. Follow up with Psych recs. Continue with regular neuro checks.    Mild euvolemic hyponatremia which did not auto correcting due to poor oral intake and liver disease - resolved on BID Salt tablets, Urea daily; Isotonic fluids can be continued for nutrition status, if poor nutrition continues; if mental status worsens, consider alternative forms of nutrition ( parenteral / NGT )   Hypoalbuminemia can lead to worsening third spacing, would recommend IV albumin infusion as needed  PRN FFPS as needed pre op for elevated coag levels   IR s/p paracentesis with PTC drain placement, now capped; c/t monitor LFTs  acute kidney injury on CKD - resolved   peritoneal cultures / blood culture - NGTD   Bile culture -VRE, klebsiella, citrobacter, candida   ID evaluated - c/w Meropenem, Fluconazole, Daptomycin for ESBL Klebsiella and VRE hepatic abscess; appreciate recs  normocytic anemia s/p PRBCs transfusions, HB stable   dvt ppx as per surgery   HTN- BP better controlled; c/w Coreg and Norvasc    Thank you for this consultation and we will continue to follow.

## 2023-09-21 NOTE — BH CONSULTATION LIAISON PROGRESS NOTE - NSBHCHARTREVIEWVS_PSY_A_CORE FT
Vital Signs Last 24 Hrs  T(C): 35.8 (20 Sep 2023 09:07), Max: 36.1 (19 Sep 2023 13:52)  T(F): 96.5 (20 Sep 2023 09:07), Max: 96.6 (20 Sep 2023 04:29)  HR: 70 (20 Sep 2023 10:50) (70 - 80)  BP: 124/64 (20 Sep 2023 10:50) (124/64 - 164/90)  BP(mean): 87 (20 Sep 2023 10:50) (87 - 119)  RR: 17 (20 Sep 2023 10:50) (17 - 20)  SpO2: 98% (20 Sep 2023 10:50) (96% - 100%)    Parameters below as of 20 Sep 2023 10:50  Patient On (Oxygen Delivery Method): room air    
Vital Signs Last 24 Hrs  T(C): 36.1 (19 Sep 2023 04:53), Max: 36.6 (18 Sep 2023 21:50)  T(F): 96.9 (19 Sep 2023 04:53), Max: 97.8 (18 Sep 2023 21:50)  HR: 70 (19 Sep 2023 09:10) (66 - 74)  BP: 132/92 (19 Sep 2023 09:10) (132/92 - 173/72)  BP(mean): 106 (19 Sep 2023 09:10) (98 - 115)  RR: 18 (19 Sep 2023 09:10) (17 - 18)  SpO2: 94% (19 Sep 2023 09:10) (94% - 100%)    Parameters below as of 19 Sep 2023 09:10  Patient On (Oxygen Delivery Method): room air    
Vital Signs Last 24 Hrs  T(C): 36.9 (16 Sep 2023 09:00), Max: 36.9 (15 Sep 2023 21:15)  T(F): 98.4 (16 Sep 2023 09:00), Max: 98.5 (15 Sep 2023 21:15)  HR: 74 (16 Sep 2023 08:51) (64 - 80)  BP: 141/65 (16 Sep 2023 08:51) (118/57 - 149/75)  BP(mean): 94 (16 Sep 2023 08:51) (81 - 103)  RR: 18 (16 Sep 2023 08:51) (16 - 18)  SpO2: 94% (16 Sep 2023 08:51) (93% - 98%)    Parameters below as of 16 Sep 2023 08:51  Patient On (Oxygen Delivery Method): room air    
Vital Signs Last 24 Hrs  T(C): 35.2 (21 Sep 2023 14:06), Max: 35.8 (20 Sep 2023 17:30)  T(F): 95.3 (21 Sep 2023 14:06), Max: 96.5 (20 Sep 2023 17:30)  HR: 64 (21 Sep 2023 16:24) (56 - 64)  BP: 131/63 (21 Sep 2023 16:24) (109/63 - 158/73)  BP(mean): 89 (21 Sep 2023 16:24) (81 - 106)  RR: 17 (21 Sep 2023 16:24) (17 - 18)  SpO2: 98% (21 Sep 2023 16:24) (94% - 98%)    Parameters below as of 21 Sep 2023 16:24  Patient On (Oxygen Delivery Method): room air

## 2023-09-21 NOTE — BH CONSULTATION LIAISON PROGRESS NOTE - NSBHCONSULTFOLLOWAFTERCARE_PSY_A_CORE FT
to be determined closer to discharge 

## 2023-09-21 NOTE — PROGRESS NOTE ADULT - SUBJECTIVE AND OBJECTIVE BOX
NEUROLOGY CONSULT: PROGRESS NOTE    ON: JO    SUBJECTIVE:  Pt says he feels better today. No acute complaints.     MEDICATIONS:  amLODIPine   Tablet 10 milliGRAM(s) Oral daily  bisacodyl Suppository 10 milliGRAM(s) Rectal every 24 hours  carvedilol 6.25 milliGRAM(s) Oral every 12 hours  chlorhexidine 2% Cloths 1 Application(s) Topical <User Schedule>  dextrose 5%. 1000 milliLiter(s) IV Continuous <Continuous>  dextrose 5%. 1000 milliLiter(s) IV Continuous <Continuous>  dextrose 50% Injectable 12.5 Gram(s) IV Push once  dextrose 50% Injectable 25 Gram(s) IV Push once  dextrose 50% Injectable 25 Gram(s) IV Push once  dextrose Oral Gel 15 Gram(s) Oral once PRN  enoxaparin Injectable 40 milliGRAM(s) SubCutaneous every 24 hours  fluconAZOLE IVPB 200 milliGRAM(s) IV Intermittent every 24 hours  gabapentin 300 milliGRAM(s) Oral two times a day  glucagon  Injectable 1 milliGRAM(s) IntraMuscular once  influenza  Vaccine (HIGH DOSE) 0.7 milliLiter(s) IntraMuscular once  insulin lispro (ADMELOG) corrective regimen sliding scale   SubCutaneous three times a day before meals  insulin lispro (ADMELOG) corrective regimen sliding scale   SubCutaneous at bedtime  magnesium sulfate  IVPB 1 Gram(s) IV Intermittent once  melatonin 5 milliGRAM(s) Oral at bedtime  meropenem  IVPB 1000 milliGRAM(s) IV Intermittent every 8 hours  ondansetron Injectable 8 milliGRAM(s) IV Push every 6 hours PRN  pantoprazole  Injectable 40 milliGRAM(s) IV Push every 12 hours  polyethylene glycol 3350 17 Gram(s) Oral daily  sodium chloride 1 Gram(s) Oral two times a day  sucralfate suspension 1 Gram(s) Oral every 6 hours  urea Oral Powder 15 Gram(s) Oral daily    VITAL SIGNS:  Vital Signs Last 24 Hrs  T(C): 35.6 (21 Sep 2023 09:09), Max: 35.8 (20 Sep 2023 17:30)  T(F): 96.1 (21 Sep 2023 09:09), Max: 96.5 (20 Sep 2023 17:30)  HR: 58 (21 Sep 2023 09:35) (56 - 64)  BP: 109/63 (21 Sep 2023 09:35) (109/63 - 158/73)  BP(mean): 81 (21 Sep 2023 09:35) (81 - 106)  RR: 18 (21 Sep 2023 09:35) (18 - 18)  SpO2: 98% (21 Sep 2023 09:35) (94% - 98%)    Parameters below as of 21 Sep 2023 09:35  Patient On (Oxygen Delivery Method): room air        PHYSICAL EXAMINATION:  General: Comfortable, ill appearing, jaundiced  Neurologic:     -Mental Status: AAOx2 to self and time. Intermittently follows commands. Speech at times garbled with waxing/waning concentration     -Cranial Nerves:          II: Visual fields - intact          III, IV, VI: no nystagmus, PERRL b/l          V:  Facial sensation V1-V3 equal and intact           VII: Face is symmetric with normal eye closure and smile          VIII: Hearing is bilaterally intact to finger rub          IX, X: Uvula is midline and soft palate rises symmetrically          XI: Head turning and shoulder shrug are intact.          XII: Tongue protrudes midline     -Motor: Normal bulk and tone. No involuntary movements (tremor, myoclonus, chorea), +asterixis          Upper extremities: shoulder abduction, elbow flexion/extension, wrist flexion/extension, handgrip          Lower extremities: hip flexion, knee extension/flexion, plantar flexion, ankle dorsiflexion          No pronator drift. unable to assess rapid alternating movements intact and symmetric     -Sensation: Intact to light touch. Pain and temperature intact.     -Reflexes: 2+ and symmetric at biceps, triceps, brachioradialis, patellar, ankles          Plantar reflexes downgoing bilaterally. Downgoing toes bilaterally     LABS:                          9.2    13.32 )-----------( 167      ( 21 Sep 2023 06:00 )             28.2     09-21    139  |  110<H>  |  31<H>  ----------------------------<  116<H>  4.3   |  20<L>  |  1.01    Ca    8.6      21 Sep 2023 06:00  Phos  3.5     09-21  Mg     1.9     09-21    TPro  6.1  /  Alb  1.8<L>  /  TBili  10.8<H>  /  DBili  7.9<H>  /  AST  112<H>  /  ALT  32  /  AlkPhos  301<H>  09-21      Urinalysis Basic - ( 21 Sep 2023 06:00 )    Color: x / Appearance: x / SG: x / pH: x  Gluc: 116 mg/dL / Ketone: x  / Bili: x / Urobili: x   Blood: x / Protein: x / Nitrite: x   Leuk Esterase: x / RBC: x / WBC x   Sq Epi: x / Non Sq Epi: x / Bacteria: x        RADIOLOGY & ADDITIONAL STUDIES:  reviewed

## 2023-09-21 NOTE — PROGRESS NOTE ADULT - SUBJECTIVE AND OBJECTIVE BOX
INTERVAL HPI/OVERNIGHT EVENTS: AxO x2. Leaking around FABIANO. BS 438cc w. condom cath no o/p.  "cola colored urine" per nurse    STATUS POST: 9/12: IR PTC and paracenthesis(2.5L)  9/19: Tube check wnl and PTC internalized    SUBJECTIVE: Patient seen and examined bedside with chief resident on AM rounds. He is more alert and oriented this morning as compared to yesterday's morning rounds. Still mumbling, but more communicative. Denies any pain. Had a bowel movement last night, per the nurse. Denies cp, sob, nausea, emesis, or fevers.    MEDICATIONS  (STANDING):  amLODIPine   Tablet 10 milliGRAM(s) Oral daily  bisacodyl Suppository 10 milliGRAM(s) Rectal every 24 hours  carvedilol 6.25 milliGRAM(s) Oral every 12 hours  chlorhexidine 2% Cloths 1 Application(s) Topical <User Schedule>  dextrose 5%. 1000 milliLiter(s) (50 mL/Hr) IV Continuous <Continuous>  dextrose 5%. 1000 milliLiter(s) (100 mL/Hr) IV Continuous <Continuous>  dextrose 50% Injectable 12.5 Gram(s) IV Push once  dextrose 50% Injectable 25 Gram(s) IV Push once  dextrose 50% Injectable 25 Gram(s) IV Push once  enoxaparin Injectable 40 milliGRAM(s) SubCutaneous every 24 hours  fluconAZOLE IVPB 200 milliGRAM(s) IV Intermittent every 24 hours  gabapentin 300 milliGRAM(s) Oral two times a day  glucagon  Injectable 1 milliGRAM(s) IntraMuscular once  influenza  Vaccine (HIGH DOSE) 0.7 milliLiter(s) IntraMuscular once  insulin lispro (ADMELOG) corrective regimen sliding scale   SubCutaneous three times a day before meals  insulin lispro (ADMELOG) corrective regimen sliding scale   SubCutaneous at bedtime  lactulose Retention Enema 200 Gram(s) Rectal once  melatonin 5 milliGRAM(s) Oral at bedtime  meropenem  IVPB 1000 milliGRAM(s) IV Intermittent every 8 hours  pantoprazole  Injectable 40 milliGRAM(s) IV Push every 12 hours  polyethylene glycol 3350 17 Gram(s) Oral daily  sodium chloride 1 Gram(s) Oral two times a day  sucralfate suspension 1 Gram(s) Oral every 6 hours  urea Oral Powder 15 Gram(s) Oral daily    MEDICATIONS  (PRN):  dextrose Oral Gel 15 Gram(s) Oral once PRN Blood Glucose LESS THAN 70 milliGRAM(s)/deciliter  ondansetron Injectable 8 milliGRAM(s) IV Push every 6 hours PRN Nausea and/or Vomiting      Vital Signs Last 24 Hrs  T(C): 35.7 (21 Sep 2023 04:29), Max: 35.8 (20 Sep 2023 17:30)  T(F): 96.3 (21 Sep 2023 04:29), Max: 96.5 (20 Sep 2023 17:30)  HR: 64 (21 Sep 2023 04:43) (56 - 74)  BP: 137/81 (21 Sep 2023 04:43) (109/66 - 158/73)  BP(mean): 103 (21 Sep 2023 04:43) (82 - 106)  RR: 18 (21 Sep 2023 04:43) (17 - 18)  SpO2: 97% (21 Sep 2023 04:43) (94% - 98%)    Parameters below as of 21 Sep 2023 04:43  Patient On (Oxygen Delivery Method): room air    PHYSICAL EXAM:  Constitutional: A&Ox3, mumbling speech  Respiratory: non labored breathing, no respiratory distress  Cardiovascular: NSR, RRR  Gastrointestinal: Abdomen soft, NTND. Hepatic FABIANO drain with minimal purulent output and PTC drain internalized and capped, still with some bilious output in the attached bag.   Genitourinary: voiding, intermittent condom cath use, incontinent   Extremities: wwp, no calf tenderness or edema, +SCDs      I&O's Detail    20 Sep 2023 07:01  -  21 Sep 2023 07:00  --------------------------------------------------------  IN:  Total IN: 0 mL    OUT:    Drain (mL): 5 mL    Drain (mL): 10 mL    Intermittent Catheterization - Urethral (mL): 350 mL    Voided (mL): 0 mL  Total OUT: 365 mL    Total NET: -365 mL        LABS:                        9.2    13.32 )-----------( 167      ( 21 Sep 2023 06:00 )             28.2     09-21    139  |  110<H>  |  31<H>  ----------------------------<  116<H>  4.3   |  20<L>  |  1.01    Ca    8.6      21 Sep 2023 06:00  Phos  3.5     09-21  Mg     1.9     09-21    TPro  6.1  /  Alb  1.8<L>  /  TBili  10.8<H>  /  DBili  7.9<H>  /  AST  112<H>  /  ALT  32  /  AlkPhos  301<H>  09-21      Urinalysis Basic - ( 21 Sep 2023 06:00 )    Color: x / Appearance: x / SG: x / pH: x  Gluc: 116 mg/dL / Ketone: x  / Bili: x / Urobili: x   Blood: x / Protein: x / Nitrite: x   Leuk Esterase: x / RBC: x / WBC x   Sq Epi: x / Non Sq Epi: x / Bacteria: x        RADIOLOGY & ADDITIONAL STUDIES:

## 2023-09-21 NOTE — PROGRESS NOTE ADULT - SUBJECTIVE AND OBJECTIVE BOX
68y old  Male who presents with a chief complaint of metastatic pancreatic ca now s/p PTC placement and drainage of 2.5L of clear, yellow ascites (9/12), now s/p Tube check wnl and PTC internalized and capped on 9/19.    Overnight, patient continued to be altered having hallucinations but with improvement in mental status. This morning, he is more appropriate and coherent, although mumbling. ROS was limited as patient was still confused.    PAST MEDICAL/SURGICAL HISTORY  PAST MEDICAL & SURGICAL HISTORY:  Pancreatic cancer metastasized to liver  S/P hip replacement, left      MEDICATIONS  (STANDING):  amLODIPine   Tablet 10 milliGRAM(s) Oral daily  bisacodyl Suppository 10 milliGRAM(s) Rectal every 24 hours  carvedilol 6.25 milliGRAM(s) Oral every 12 hours  chlorhexidine 2% Cloths 1 Application(s) Topical <User Schedule>  dextrose 5%. 1000 milliLiter(s) (50 mL/Hr) IV Continuous <Continuous>  dextrose 5%. 1000 milliLiter(s) (100 mL/Hr) IV Continuous <Continuous>  dextrose 50% Injectable 12.5 Gram(s) IV Push once  dextrose 50% Injectable 25 Gram(s) IV Push once  dextrose 50% Injectable 25 Gram(s) IV Push once  enoxaparin Injectable 40 milliGRAM(s) SubCutaneous every 24 hours  fluconAZOLE IVPB 200 milliGRAM(s) IV Intermittent every 24 hours  gabapentin 300 milliGRAM(s) Oral two times a day  glucagon  Injectable 1 milliGRAM(s) IntraMuscular once  influenza  Vaccine (HIGH DOSE) 0.7 milliLiter(s) IntraMuscular once  insulin lispro (ADMELOG) corrective regimen sliding scale   SubCutaneous three times a day before meals  insulin lispro (ADMELOG) corrective regimen sliding scale   SubCutaneous at bedtime  lactulose Retention Enema 200 Gram(s) Rectal once  melatonin 5 milliGRAM(s) Oral at bedtime  meropenem  IVPB 1000 milliGRAM(s) IV Intermittent every 8 hours  pantoprazole  Injectable 40 milliGRAM(s) IV Push every 12 hours  polyethylene glycol 3350 17 Gram(s) Oral daily  sodium chloride 1 Gram(s) Oral two times a day  sucralfate suspension 1 Gram(s) Oral every 6 hours  urea Oral Powder 15 Gram(s) Oral daily    MEDICATIONS  (PRN):  dextrose Oral Gel 15 Gram(s) Oral once PRN Blood Glucose LESS THAN 70 milliGRAM(s)/deciliter  ondansetron Injectable 8 milliGRAM(s) IV Push every 6 hours PRN Nausea and/or Vomiting    T(C): 35.6 (09-21-23 @ 09:09), Max: 35.8 (09-20-23 @ 17:30)  HR: 64 (09-21-23 @ 04:43) (56 - 74)  BP: 137/81 (09-21-23 @ 04:43) (109/66 - 158/73)  RR: 18 (09-21-23 @ 04:43) (17 - 18)  SpO2: 97% (09-21-23 @ 04:43) (94% - 98%)  Wt(kg): --Vital Signs Last 24 Hrs  T(C): 35.6 (21 Sep 2023 09:09), Max: 35.8 (20 Sep 2023 17:30)  T(F): 96.1 (21 Sep 2023 09:09), Max: 96.5 (20 Sep 2023 17:30)  HR: 64 (21 Sep 2023 04:43) (56 - 74)  BP: 137/81 (21 Sep 2023 04:43) (109/66 - 158/73)  BP(mean): 103 (21 Sep 2023 04:43) (82 - 106)  RR: 18 (21 Sep 2023 04:43) (17 - 18)  SpO2: 97% (21 Sep 2023 04:43) (94% - 98%)    Parameters below as of 21 Sep 2023 04:43  Patient On (Oxygen Delivery Method): room air      Oxygen Saturation Index= Unable to calculate   [Based on FiO2 = Unknown, SpO2 = 97(09/21/2023 04:43), MAP = Unknown]  Daily     Daily     PHYSICAL EXAM:  EYES: scleral icterus  NERVOUS SYSTEM:  Alert & Oriented X 3, mumbling but coherent, more responsive  CHEST/LUNG: CTAB; No rales, rhonchi, wheezing, or rubs  HEART: Regular rate and rhythm; No murmurs, rubs, or gallops  ABDOMEN: Soft, Nontender, Nondistended; Bowel sounds present; FABIANO drain with minimal brownish/purulent output and PTC capped;  EXTREMITIES: No edema  SKIN: jaundice    CAPILLARY BLOOD GLUCOSE  POCT Blood Glucose.: 104 mg/dL (21 Sep 2023 06:58)  POCT Blood Glucose.: 129 mg/dL (20 Sep 2023 21:58)  POCT Blood Glucose.: 105 mg/dL (20 Sep 2023 17:21)  POCT Blood Glucose.: 97 mg/dL (20 Sep 2023 11:47)    LABS:  CBC   09-21-23 @ 06:00  Hematcorit 28.2  Hemoglobin 9.2  Mean Cell Hemoglobin 32.9  Platelet Count-Automated 167  RBC Count 2.80  Red Cell Distrib Width 21.0  Wbc Count 13.32    BMP  09-21-23 @ 06:00  Anion Gap. Serum 9  Blood Urea Nitrogen,Serm 31  Calcium, Total Serum 8.6  Carbon Dioxide, Serum 20  Chloride, Serum 110  Creatinine, Serum 1.01  eGFR in  --  eGFR in Non Afican American --  Gloucose, serum 116  Potassium, Serum 4.3  Sodium, Serum 139    09-20-23 @ 06:06  Anion Gap. Serum 11  Blood Urea Nitrogen,Serm 31  Calcium, Total Serum 8.4  Carbon Dioxide, Serum 20  Chloride, Serum 108  Creatinine, Serum 1.08  eGFR in  --  eGFR in Non Afican American --  Gloucose, serum 110  Potassium, Serum 3.9  Sodium, Serum 139    09-19-23 @ 05:17  Anion Gap. Serum 10  Blood Urea Nitrogen,Serm 27  Calcium, Total Serum 8.4  Carbon Dioxide, Serum 21  Chloride, Serum 105  Creatinine, Serum 1.13  eGFR in  --  eGFR in Non Afican American --  Gloucose, serum 126  Potassium, Serum 3.8  Sodium, Serum 136    CMP  09-21-23 @ 06:00  Komal Aminotransferase(ALT/SGPT)32  Albumin, Serum 1.8  Alkaline Phosphatase, Serum 301  Anion Gap, Serum 9  Aspartate Aminotransferase (AST/SGOT)112  Bilirubin Total, Serum 10.8  Blood Urea Nitrogen, Serum 31  Calcium,Total Serum 8.6  Carbon Dioxide, Serum 20  Chloride, Serum 110  Creatinine, Serum 1.01  eGFR if  --  eGFR if Non African American --  Glucose, Serum 116  Potassium, Serum 4.3  Protein Total, Serum 6.1  Sodium, Serum 139    09-20-23 @ 06:06  Komal Aminotransferase(ALT/SGPT)30  Albumin, Serum 2.0  Alkaline Phosphatase, Serum 294  Anion Gap, Serum 11  Aspartate Aminotransferase (AST/SGOT)90  Bilirubin Total, Serum 11.2  Blood Urea Nitrogen, Serum 31  Calcium,Total Serum 8.4  Carbon Dioxide, Serum 20  Chloride, Serum 108  Creatinine, Serum 1.08  eGFR if  --  eGFR if Non African American --  Glucose, Serum 110  Potassium, Serum 3.9  Protein Total, Serum 6.1  Sodium, Serum 139    09-19-23 @ 05:17  Komal Aminotransferase(ALT/SGPT)30  Albumin, Serum 1.9  Alkaline Phosphatase, Serum 323  Anion Gap, Serum 10  Aspartate Aminotransferase (AST/SGOT)88  Bilirubin Total, Serum 12.6  Blood Urea Nitrogen, Serum 27  Calcium,Total Serum 8.4  Carbon Dioxide, Serum 21  Chloride, Serum 105  Creatinine, Serum 1.13  eGFR if  --  eGFR if Non African American --  Glucose, Serum 126  Potassium, Serum 3.8  Protein Total, Serum 6.2  Sodium, Serum 136    PT/INR  PT/INR  09-19-23 @ 05:17  INR 1.55  Prothrombin Time Comment --  Prothrobin Time, [id number]      RADIOLOGY & ADDITIONAL TESTS:

## 2023-09-21 NOTE — BH CONSULTATION LIAISON PROGRESS NOTE - NSBHMSESPEECH_PSY_A_CORE
Abnormal as indicated, otherwise normal...
Abnormal as indicated, otherwise normal...
Normal volume, rate, productivity, spontaneity and articulation
Abnormal as indicated, otherwise normal...

## 2023-09-21 NOTE — BH CONSULTATION LIAISON PROGRESS NOTE - NSBHFUPINTERVALHXFT_PSY_A_CORE
Patient was seen at bedside. He presented more sedated, unable to engage in an interview, with disorganized responses.   Per the nursing staff, during the night pt was agitated, pulling IV lines/tubes.
Patient was seen at bedside. Per the nursing staff and this writer's observation, he presents with improved alertness but still confused and disoriented to time and situation ("I've been in the hospital for 5 weeks"). Per staff, pt's mental status improved after lactulose.   Pt was unable to participate in a full interview.   
Per the primary team, during the last 24hrs pt has been waxing and waning, with confusion and agitation that responded to verbal redirection. Earlier today pt was heard by the nursing staff to scream from his room. Pt reported to the staff that he has been experiencing hallucinations.   Pt was seen by this writer at bedside. He presented disoriented to place( "it's a medicare facility", could not name the hospital), date, day of the week, timeline of admission ("I came here 6 hrs ago"). Pt was not able to provide a correct reason for admission ("I came for my legs"). He was unable to provide information about experiencing recentely hallucinations or delusions. 
Patient seen at bedside. Patient is calm, cooperative, engages meaningfully, was initially asleep. Patient reports he feels both physically and emotionally better compared to yesterday. Patient reports improvement in nausea. Patient reports after taking Remeron, he slept well and throughout the night. Denies medication SE. Patient denies SI, and reports he has a lot of support (wife, in-laws). Patient reports he is not interested in outpatient psych treatment.

## 2023-09-21 NOTE — BH CONSULTATION LIAISON PROGRESS NOTE - NSBHPSYCHOLCOGORIENT_PSY_A_CORE
Not fully oriented...
Not fully oriented...
Oriented to time, place, person, situation
Not fully oriented...

## 2023-09-21 NOTE — PROGRESS NOTE ADULT - ATTENDING COMMENTS
metastatic pancreatic cancer complicated by biliary obstruction and cholangitis with bile cultures growing ESBL kleb, VRE, candida on dapto, fluconazole and meropenem, neurology consult for encephalopathy, he appears jaundice, with asterixis but able to answer questions appropriately and follow commons, limbs antigravity and symmetric in strength. Likely metabolic and septic encephalopathy. If seizure activity can get EEG. MRI recommended to rule out structural cause,

## 2023-09-21 NOTE — BH CONSULTATION LIAISON PROGRESS NOTE - NSBHASSESSMENTFT_PSY_ALL_CORE
67 yo male, with PPH of "depression" 15-20 years prior, now with PMH HTN, GERD and recent diagnosis of stage IV pancreatic cancer with numerous liver metastasis, presented to [facility] and diagnosed with stage IV adenocarcinoma, s/p liver abscess drainage x 6 8/15, s/p ERCp with two stent placement as per records, getting paracentesis with possibly PCT drain placement, history of klebsiella bacteremia from outpatient basis. Psychiatry initially consulted for recommendations for anxiety/depression now following up for agitation/confusion, found to be delirious with disorientation and AH.  On re-assessment today pt presents more alert and oriented (oriented to place and person, disoriented to time and situation). Per the staff his improvement followed the administration of lactulose.   dx Delirium due to underlying medical condition   -medical management for delirium as per the primary team    -continue gabapentin 300mg po q12h for treatment of agitation associated with delirium     -for breakthrough agitation, can use olanzapine 2.5mg po/im q6h prn; monitor for qtc prolongation  -if pt becomes again very agitated with poor response to verbal redirection and prns, consider starting standing olanzapine 2.5mg; if olanzapine is started, please d/c gabapentin due to risk of cumulative sedative effect  -CL to follow as needed, please call with questions/concerns    - Delirium Precautions:  Orientation protocols: Provision of clocks, calendars, windows with outside views and frequent verbal reorientation of patients.  Avoiding and/or monitoring the use of problematic medications: Avoid benzodiazepines. Use caution when prescribing opioids, dihydropyridines, anticholinergics, antihistamines.  Cognitive stimulation: Regular visits from family and friends. Avoid overstimulation (particularly at night)Facilitation of physiologic sleep: Nursing and medical procedures, including the administration ofmedications, should be avoided during sleeping hours when possible. Night-time noise should be reduced.  Early mobilization and minimized use of physical restraints for patients with limited mobility.   Visual and hearing aids for patients with these impairments  -CL to follow

## 2023-09-21 NOTE — PROGRESS NOTE ADULT - ASSESSMENT
68M with PMHx of HTN, GERD and recent diagnosis of stage IV pancreatic cancer with numerous liver metastasis who presents to [facility] from home for second opinion regarding treatmnet options and progressive fatigue and weakness, as well as anorexia 2/2 poor appetite. Now s/p PTC placement and drainage of 2.5L of clear, yellow ascites (9/12) now s/p Tube check wnl and PTC internalized on 9/19.    CLD ADAT/IVF  Pain/nausea control PRN  Lactulose enema  Miralax bowel reg  IV meropenem (9/15-) and daptomycin (9/15-) fluconazole (9/16-)  Coreg 6.25mg bid  PPI BID  SCDs/IS/OOB  R posterior IR drain (hepatic abscess)  Anterior PTC drain, internalized and capped  Dispo: PT recs MARIE, but pt refused

## 2023-09-22 NOTE — PROVIDER CONTACT NOTE (CRITICAL VALUE NOTIFICATION) - SITUATION
Pt hemoglobin 7.0 and hematocrit 20.9
pt Glucose level 688 pt does have d5 1/2 ns running at 120cc  RN did pt FS at 7 and it was 146
Troponin 87 (range 0-51)

## 2023-09-22 NOTE — PROGRESS NOTE ADULT - ASSESSMENT
68M with PMHx of HTN, GERD and recent diagnosis of stage IV pancreatic cancer with numerous liver metastasis presented to [facility] and diagnosed with stage IV adenocarcinoma, s/p liver abscess drainage x 6 8/15, s/p ERCp with two stent placement as per records, getting paracentesis with possibly PCT drain placement, history of klebsiella bacteremia from outpatient basis , patient pending upgrade to ICU for monitoring, biliary drain placed, 2.5 L fluid drained which was clear and yellow, hypotensive during procedure, 2 FFPs given pre-op, biliary tree stents visualized and in place,  now s/p Tube check wnl and PTC internalized on 9/19 with worsening encephalopathy.    Encephalopathy - toxic vs. metabolic vs. infectious vs. hepatic vs. hypertensive vs. hypoxic vs. uremic vs. vascular.  Neurology consulted and it is believed to be metabolic in nature. CT Head ruled out CVA or any other overt etiology. MRI Brain ordered - will follow up. Unlikely to be infectious as patient also has been on numerous antibiotics for a stable period of time. Ammonia level was normal although after Lactulose, patient's mental status did improve - c/t monitor bowel movements and remain cognizant of adverse diarrhea. We would recommend holding any sedating agents that could worsen his AMS. Follow up with Psych recs. Continue with regular neuro checks.    Mild euvolemic hyponatremia which did not auto correcting due to poor oral intake and liver disease - resolved on BID Salt tablets, Urea daily; Isotonic fluids can be continued for nutrition status, if poor nutrition continues; if mental status worsens, consider alternative forms of nutrition ( parenteral / NGT )   Hypoalbuminemia can lead to worsening third spacing, would recommend IV albumin infusion as needed  PRN FFPS as needed pre op for elevated coag levels   IR s/p paracentesis with PTC drain placement, now capped; c/t monitor LFTs  acute kidney injury on CKD - resolved   peritoneal cultures / blood culture - NGTD   Bile culture -VRE, klebsiella, citrobacter, candida   ID evaluated - c/w Meropenem, Fluconazole, Daptomycin for ESBL Klebsiella and VRE hepatic abscess; appreciate recs  normocytic anemia s/p PRBCs transfusions, HB stable   dvt ppx as per surgery   HTN- BP better controlled; c/w Coreg and Norvasc    Thank you for this consultation and we will continue to follow.

## 2023-09-22 NOTE — PROVIDER CONTACT NOTE (OTHER) - SITUATION
Day RN getting report from night RN when patient went into rapid afib and pacemaker was not capturing
DNR/DNI status confirmed.  Team informed of 6 second pause (per monitor tech)
Pt SB to low 40's per telemetry technician, waxes and weans
Pt SB again and asystole on monitor for 5 seconds
Upon assessing pt's back, guaze dressing for FABIANO drain saturated with serosanguineous drainage.
Only voided 200ml, and 1X incontinent today
Pt is refusing medications and neuro assessment, pt is in NSR w/ PVC's up to 20/min, pt has brown urine
Pt removed NG tube and have PO meds ordered with NPO diet order.
Pt disoriented and possibly hallucinating
Pt is A&O x 1 on neuro assessment, is in NSR w/ PVC's up to 15/min, pt is agitated and refusing to have abdomen assessed
Pt refusing to take 6pm medications PO. Pt a bit hostile and aggressive "you better leave me alone and back up before I punch you". Unable to give PO meds despite several separate attempts.
Pt willian to 49, 2.5 sec pause on monitor.

## 2023-09-22 NOTE — CONSULT NOTE ADULT - ATTENDING COMMENTS
Stage IV pancratic cancer with liver mets, ascites, multiple stents now s/p PTC and ascites drainage c/b introperative hypotension requiring pressors  physical as above  now hemodynamically stable  empiric zosyn pending cultures (was on this at previous hospitalization)  rest as above
Patient seen at bedside of 09/22 with Cards Fellow    Patient is a 68M with PMHx of HTN, GERD and recent diagnosis of stage IV pancreatic cancer with numerous liver metastasis who presented to [facility] for second opinion, now s/p biliary drain placement with 2.5L fluid drained with Course complicated by worsening mental status and bradycardia with significant pause on telemetry, for which cardiology consulted.    Review of Studies:  - EKG 9/22: NSR with RBBB and LAFB, rate 60    #Sinus bradycardia with Sinus pause  - Patient with Metastatic Terminal Pancreatic Cancer with Sinus pause on Tele  - Pause likely multifactorial from persistent Hypothermia hypothermia and use of AV aurea blockers (Coreg, last dose this am)  - Recommend use of bear hugger/ heat packs to increase body temperature  - Would DC Coreg and all AV aurea blockers at this time. If regimen needed for BP control would aim for NOrvasc  - Would keep keep pacer pads on (with backup rate of 40bpm) and have atropine 1mg at bedside  - Given overall poor prognosis, agree w/ palliative care consult. Patient currently DNR/DNI with ongoing discussion for comfort care measures  - Would not pursue any invasive measures (such as TVP) at this time
metastatic pancreatic cancer complicated by biliary obstruction and cholangitis with bile cultures growing ESBL kleb, VRE, candida on dapto, fluconazole and meropenem, neurology consult for encephalopathy, he appears jaundice, with asterixis but able to answer questions appropriately and follow commons, limbs antigravity and symmetric in strength. Likely metabolic and septic encephalopathy. If seizure activity can get EEG. MRI recommended to rule out structural cause,

## 2023-09-22 NOTE — PROGRESS NOTE ADULT - ASSESSMENT
Toxic metabolic encephalopathy  metastatic pancreatic cancer  mental status change but seemingly improving ?  follow clinical course  still at risk for hepatic encephalopathy despite normal ammonia  for now observe

## 2023-09-22 NOTE — CONSULT NOTE ADULT - ASSESSMENT
Patient is a 68M with PMHx of HTN, GERD and recent diagnosis of stage IV pancreatic cancer with numerous liver metastasis who presents to [facility] for second opinion, now s/p biliary drain placement with 2.5L fluid drained. Course c/b worsening mental status and bradycardia with significant pause on telemetry, for which cardiology consulted.    Review of Studies:  EKG 9/22: NSR with RBBB and LAFB, rate 60      Recommendations:    #Sinus bradycardia  #Sinus pause  - likely bradycardic iso of hypothermia and AV aurea blockers, along with overall severe illness  - recommend bear hugger or heat packs to increase body temperature  - would hold Coreg and all AV aurea blockers at this time  - keep pacer pads on (with backup rate of 40bpm) and have atropine 1mg at bedside  - given overall poor prognosis, agree w/ palliative care consult  - per family, pt is now DNR/DNI and they are considering comfort care measures  - would not pursue any invasive measures (such as TVP) at this time        Recommendations above are preliminary pending discussion with an attending cardiologist  Plan was discussed with primary team  We'll continue to follow, thank you for the consultation      Michelle Escudero   Cardiovascular Disease Fellow  Consult Pager: [phone]

## 2023-09-22 NOTE — PROVIDER CONTACT NOTE (OTHER) - RECOMMENDATIONS
Would you like to look at drainage amount?
EP consult STAT and cardiology consult
EKG?
Contact provider to assess pt at bedside.
Start standing IV fluids
Asael Loya (Team 1) will given medication IV.
Asael Loya perform EKG and she is coming to assess patient.
Per Shalini, no intervention continue to monitor and report any new changes.
Contact provider.
Replacement NG tube and change PO meds to IV.

## 2023-09-22 NOTE — PROVIDER CONTACT NOTE (OTHER) - REASON
Pt removed NG tube and have PO meds ordered with NPO diet order.
Pt willian to 49, 2.5 sec pause on monitor. Attempted to call x2
Disoriented / agitated
HR with 6 second pause
Pt SB
dressing saturated
Disorientation
Pt refusing to take 6pm medications PO
Pt is A&O x 1 on neuro assessment, is in NSR w/ PVC's up to 15/min, pt is agitated and refusing to have abdomen assessed
Pt is refusing medications and neuro assessment, pt is in NSR w/ PVC's up to 20/min, pt has brown urine
Low urine output
Pacemaker not capturing

## 2023-09-22 NOTE — PROVIDER CONTACT NOTE (OTHER) - DATE AND TIME:
16-Sep-2023 15:36
22-Sep-2023 18:04
16-Sep-2023 18:30
19-Sep-2023 01:25
19-Sep-2023 08:15
19-Sep-2023 18:21
22-Sep-2023 12:40
16-Sep-2023 12:46
22-Sep-2023 13:00
17-Sep-2023 08:30
22-Sep-2023 04:00
16-Sep-2023 05:39
22-Sep-2023 00:04

## 2023-09-22 NOTE — CONSULT NOTE ADULT - SUBJECTIVE AND OBJECTIVE BOX
HPI:  Patient is a 68M with PMHx of HTN, GERD and recent diagnosis of stage IV pancreatic cancer with numerous liver metastasis who presents to [facility] from home for second opinion regarding treatmnet options. Patient reports he was in good health prior when he developed painless jaundice in the end of July with darkening urine and acholic stools. States he initially presented to [facility] and was diagnosed with stage IV adenocarcinoma. Underwent ERCP with 2 biliary stents placed (R anterior and posterior hepatic ducts) and was discharged to follow up with Dr. Ayala (oncologist at [facility]). At presentation later in August to outpatient oncologist office, patient was noted to be hypotensive and febrile c/f cholangitis requiring hospital admission for posterior segment 6 liver abscess that was drained percutaneously (8/15).  States he was admitted for 2-3 weeks and reports upon discharged he followed up with Dr. Goldberg on Thursday for second opinion. Prior to this admission he has been experiencing progressive fatigue and weakness, as well as anorexia 2/2 poor appetite. States he has become weak to the point where he cannot walk on his own and requires a cane. Now presenting to [facility] at referral of a friend requesting consultation with Dr. Gorman. As per verbal report from outpatient oncologist, patient has had positive blood cultures for Klebsiella. In the ED, patient jaundiced and ill-appearing, VSS and labs with WBC 7.5 with neutrophil predominance, Hb 7.7, T bili noted to be 12.2, INR 1.65, C 1.64, , , ALT 54. CT scan with IV contrast in the ED with numerous hepatic metastasis. Now s/p PTC placement and drainage of 2.5L of clear, yellow ascites (9/12). Course c/b hypothermia and progressively worsening mental status. Pt noted to be bradycardic to 40s and with sinus pause 4.7 sec on 9/22 so cardiology consulted.      ROS: Per HPI    PAST MEDICAL & SURGICAL HISTORY:  Pancreatic cancer metastasized to liver  S/P hip replacement, left    SOCIAL HISTORY: 50+ pack year smoking history - quit 4 years ago. Denies alcohol or additional drug use. Reports he worked in [employer]    FAMILY HISTORY: history of colon ca and leukemia in paternal grandfather. Denies family history of pancreatic cancer.    ALLERGIES: 	  No Known Allergies          MEDICATIONS:  amLODIPine   Tablet 10 milliGRAM(s) Oral daily  bisacodyl Suppository 10 milliGRAM(s) Rectal every 24 hours  carvedilol 6.25 milliGRAM(s) Oral every 12 hours  chlorhexidine 2% Cloths 1 Application(s) Topical <User Schedule>  DAPTOmycin IVPB 800 milliGRAM(s) IV Intermittent every 24 hours  dextrose 5%. 1000 milliLiter(s) IV Continuous <Continuous>  dextrose 5%. 1000 milliLiter(s) IV Continuous <Continuous>  dextrose 50% Injectable 12.5 Gram(s) IV Push once  dextrose 50% Injectable 25 Gram(s) IV Push once  dextrose 50% Injectable 25 Gram(s) IV Push once  dextrose Oral Gel 15 Gram(s) Oral once PRN  enoxaparin Injectable 40 milliGRAM(s) SubCutaneous every 24 hours  fluconAZOLE IVPB 200 milliGRAM(s) IV Intermittent every 24 hours  gabapentin 300 milliGRAM(s) Oral two times a day  glucagon  Injectable 1 milliGRAM(s) IntraMuscular once  influenza  Vaccine (HIGH DOSE) 0.7 milliLiter(s) IntraMuscular once  insulin lispro (ADMELOG) corrective regimen sliding scale   SubCutaneous three times a day before meals  insulin lispro (ADMELOG) corrective regimen sliding scale   SubCutaneous at bedtime  melatonin 5 milliGRAM(s) Oral at bedtime  ondansetron Injectable 8 milliGRAM(s) IV Push every 6 hours PRN  pantoprazole  Injectable 40 milliGRAM(s) IV Push every 12 hours  polyethylene glycol 3350 17 Gram(s) Oral daily  sodium chloride 1 Gram(s) Oral two times a day  sucralfate suspension 1 Gram(s) Oral every 6 hours  urea Oral Powder 15 Gram(s) Oral daily      T(C): 36.4 (09-22-23 @ 17:52), Max: 36.4 (09-22-23 @ 17:52)  HR: 58 (09-22-23 @ 16:15) (56 - 72)  BP: 115/58 (09-22-23 @ 16:15) (105/59 - 143/65)  RR: 16 (09-22-23 @ 16:15) (16 - 18)  SpO2: 99% (09-22-23 @ 16:15) (96% - 99%)    09-21-23 @ 07:01  -  09-22-23 @ 07:00  --------------------------------------------------------  IN: 185 mL / OUT: 663 mL / NET: -478 mL    09-22-23 @ 07:01  -  09-22-23 @ 18:03  --------------------------------------------------------  IN: 640 mL / OUT: 265 mL / NET: 375 mL        PHYSICAL EXAM:    Constitutional: ill-appearing, jaundiced  HEENT: NC/AT,  +scleral icterus  Respiratory: poor inspiratory effort but CTA  Cardiac: +S1/S2; bradycardic; no M/R/G  Gastrointestinal: distended, nontender  Extremities: trace pitting edema b/l  Neurologic: AAOx0 not verbalizing coherently, mumbling        I&O's Summary    21 Sep 2023 07:01  -  22 Sep 2023 07:00  --------------------------------------------------------  IN: 185 mL / OUT: 663 mL / NET: -478 mL    22 Sep 2023 07:01  -  22 Sep 2023 18:03  --------------------------------------------------------  IN: 640 mL / OUT: 265 mL / NET: 375 mL        LABS:	 	                        8.1    11.21 )-----------( 147      ( 22 Sep 2023 13:35 )             24.9     09-22    143  |  114<H>  |  36<H>  ----------------------------<  141<H>  4.1   |  20<L>  |  1.21    Ca    8.1<L>      22 Sep 2023 13:35  Phos  3.6     09-22  Mg     2.2     09-22    TPro  5.7<L>  /  Alb  1.8<L>  /  TBili  9.9<H>  /  DBili  x   /  AST  187<H>  /  ALT  41  /  AlkPhos  313<H>  09-22    CARDIAC MARKERS ( 22 Sep 2023 13:35 )  x     / x     / 68 U/L / x     / x

## 2023-09-22 NOTE — PROVIDER CONTACT NOTE (OTHER) - BACKGROUND
HR has been in the high 50s-70s. Attempted to warm patient but pt refuses by kicking blankets off.
Patient is a 68M with PMHx of HTN, GERD and recent diagnosis of stage IV pancreatic cancer with numerous liver metastasis 9/12: IR PTC and paracenthesis 9/19: Tube check wnl and PTC internalized
low urine output yesterday as well
Report from dayshift RN stated that patient was AOx4, but forgetful sometimes/disoriented to time. RN assessment found pt to be AOx3, able to state name, , location, unable to state time/situation
Pt c/o nausea.
Pt has stage 4 cancer
Pacemaker placed at [facility] 9/8 and came for bloody BMs after discharged
Patient is a 68M with PMHx of HTN, GERD and recent diagnosis of stage IV pancreatic cancer with numerous liver metastasis
Pt regionalized yesterday from [facility] and upgraded due to N and V, pt pulled out NG tube and 1 IV last night
68M with PMHx of HTN, GERD and recent diagnosis of stage IV pancreatic cancer with numerous liver metastasis
Pt had FABIANO drain placed back on 08/15. Not much drainage in actual drain.

## 2023-09-22 NOTE — PROGRESS NOTE ADULT - SUBJECTIVE AND OBJECTIVE BOX
INTERVAL HPI/OVERNIGHT EVENTS: drains flushed, mildly distended. temp 96F rectal, bear hugger placed. patient keeps removing. BS 500cc. Glass placed, 225cc+ output, 2.5sec pause on monitor, hr down to 40s-60s. ekg unchanged from 9/15.     STATUS POST: 9/12: IR PTC and paracenthesis(2.5L)  9/19: Tube check wnl and PTC internalized    SUBJECTIVE: Patient seen and examined at bedside with chief resident. Patient's mental status continues to wax and wane, this morning patient less oriented than previous, A&Ox1. He endorses mild abdominal pain, denies nausea and vomiting.       amLODIPine   Tablet 10 milliGRAM(s) Oral daily  carvedilol 6.25 milliGRAM(s) Oral every 12 hours  DAPTOmycin IVPB 800 milliGRAM(s) IV Intermittent every 24 hours  enoxaparin Injectable 40 milliGRAM(s) SubCutaneous every 24 hours  fluconAZOLE IVPB 200 milliGRAM(s) IV Intermittent every 24 hours  urea Oral Powder 15 Gram(s) Oral daily      Vital Signs Last 24 Hrs  T(C): 35.8 (22 Sep 2023 04:48), Max: 35.8 (22 Sep 2023 04:48)  T(F): 96.4 (22 Sep 2023 04:48), Max: 96.4 (22 Sep 2023 04:48)  HR: 72 (22 Sep 2023 10:45) (60 - 72)  BP: 110/67 (22 Sep 2023 10:45) (110/67 - 143/65)  BP(mean): 84 (22 Sep 2023 10:45) (84 - 93)  RR: 16 (22 Sep 2023 10:45) (16 - 18)  SpO2: 98% (22 Sep 2023 10:45) (96% - 99%)    Parameters below as of 22 Sep 2023 10:45  Patient On (Oxygen Delivery Method): room air      I&O's Detail    21 Sep 2023 07:01  -  22 Sep 2023 07:00  --------------------------------------------------------  IN:    IV PiggyBack: 50 mL    Oral Fluid: 135 mL  Total IN: 185 mL    OUT:    Drain (mL): 3 mL    Drain (mL): 0 mL    Indwelling Catheter - Urethral (mL): 385 mL    Intermittent Catheterization - Urethral (mL): 275 mL  Total OUT: 663 mL    Total NET: -478 mL      22 Sep 2023 07:01  -  22 Sep 2023 12:33  --------------------------------------------------------  IN:    Oral Fluid: 400 mL  Total IN: 400 mL    OUT:    Drain (mL): 0 mL    Drain (mL): 5 mL    Indwelling Catheter - Urethral (mL): 80 mL  Total OUT: 85 mL    Total NET: 315 mL          General: NAD, resting comfortably in bed  C/V: NSR  Pulm: Nonlabored breathing, no respiratory distress  Abd: moderately distended, with ttp worst in left hemiabdomen, hepatic FABIANO drain with scant purulent output, PTC hammad internalized and capped with bilious output in drainage bag  Extrem: WWP, no edema, SCDs in place        LABS:                        8.4    11.03 )-----------( 139      ( 22 Sep 2023 06:01 )             26.0     09-22    143  |  114<H>  |  34<H>  ----------------------------<  121<H>  4.2   |  21<L>  |  1.11    Ca    8.5      22 Sep 2023 06:01  Phos  3.6     09-22  Mg     2.2     09-22    TPro  5.9<L>  /  Alb  1.9<L>  /  TBili  9.7<H>  /  DBili  7.3<H>  /  AST  158<H>  /  ALT  38  /  AlkPhos  293<H>  09-22      Urinalysis Basic - ( 22 Sep 2023 06:01 )    Color: x / Appearance: x / SG: x / pH: x  Gluc: 121 mg/dL / Ketone: x  / Bili: x / Urobili: x   Blood: x / Protein: x / Nitrite: x   Leuk Esterase: x / RBC: x / WBC x   Sq Epi: x / Non Sq Epi: x / Bacteria: x        RADIOLOGY & ADDITIONAL STUDIES:

## 2023-09-22 NOTE — PROVIDER CONTACT NOTE (OTHER) - ASSESSMENT
Pt heard screaming from hallway. Pt yelling & cursing when RN entered room. Pt speech illogical &  agitated. Pt found with gown torn off, cardiac leads removed and IV pulled out of arm. Pt reoriented. pt states that he needs to use bathroom. Pt placed on bedpan.
Pt heard yelling from hallway, RN entered room to check on patient, patient appears confused, pointing to end of bed and asked "what is that baby doing there?" RN reoriented patient, assured patient that there was no baby. Pt able to state name, , location, disoriented to time (as he was earlier).
Pt is agitated and refusing medications, pt stated "get out of this room and take care of some other patient". Vital signs stable and charted. No complaints of chest pain. Refuses to follow commands to properly assess neuro status and orientation.
HR 58 /73 M 87 R 16 SPO2 96%
Pt agitated and verbally physically aggressive
Pt constantly moving, dressing saturated.
See flowsheet for 850 am vitals, denies chest pain SOB and BP stable. Afib noted
HR 58 now
Pt asleep. HR drops to mid 30s and 49, but does not sustain. Other vitals stable.
Pt is agitated and refusing to have abdomen assessed, disoriented to time, place, and situation. NSR w/ PVC's up to 15/ min on monitor. Denies chest pain. VSS
VSS. No nausea.
200ml urine output brown in color

## 2023-09-22 NOTE — PROGRESS NOTE ADULT - SUBJECTIVE AND OBJECTIVE BOX
68y old  Male who presents with a chief complaint of metastatic pancreatic ca now s/p PTC placement and drainage of 2.5L of clear, yellow ascites (9/12), now s/p Tube check wnl and PTC internalized and capped on 9/19.    Overnight, patient continued to be altered having hallucinations and attempting to pull out his IV. This morning, he is slightly more appropriate and coherent, although mumbling and agitated. ROS was limited as patient is still confused.    PAST MEDICAL/SURGICAL HISTORY  PAST MEDICAL & SURGICAL HISTORY:  Pancreatic cancer metastasized to liver  S/P hip replacement, left    MEDICATIONS  (STANDING):  amLODIPine   Tablet 10 milliGRAM(s) Oral daily  bisacodyl Suppository 10 milliGRAM(s) Rectal every 24 hours  carvedilol 6.25 milliGRAM(s) Oral every 12 hours  chlorhexidine 2% Cloths 1 Application(s) Topical <User Schedule>  DAPTOmycin IVPB 800 milliGRAM(s) IV Intermittent every 24 hours  dextrose 5%. 1000 milliLiter(s) (50 mL/Hr) IV Continuous <Continuous>  dextrose 5%. 1000 milliLiter(s) (100 mL/Hr) IV Continuous <Continuous>  dextrose 50% Injectable 12.5 Gram(s) IV Push once  dextrose 50% Injectable 25 Gram(s) IV Push once  dextrose 50% Injectable 25 Gram(s) IV Push once  enoxaparin Injectable 40 milliGRAM(s) SubCutaneous every 24 hours  fluconAZOLE IVPB 200 milliGRAM(s) IV Intermittent every 24 hours  gabapentin 300 milliGRAM(s) Oral two times a day  glucagon  Injectable 1 milliGRAM(s) IntraMuscular once  influenza  Vaccine (HIGH DOSE) 0.7 milliLiter(s) IntraMuscular once  insulin lispro (ADMELOG) corrective regimen sliding scale   SubCutaneous three times a day before meals  insulin lispro (ADMELOG) corrective regimen sliding scale   SubCutaneous at bedtime  melatonin 5 milliGRAM(s) Oral at bedtime  pantoprazole  Injectable 40 milliGRAM(s) IV Push every 12 hours  polyethylene glycol 3350 17 Gram(s) Oral daily  sodium chloride 1 Gram(s) Oral two times a day  sucralfate suspension 1 Gram(s) Oral every 6 hours  urea Oral Powder 15 Gram(s) Oral daily    MEDICATIONS  (PRN):  dextrose Oral Gel 15 Gram(s) Oral once PRN Blood Glucose LESS THAN 70 milliGRAM(s)/deciliter  ondansetron Injectable 8 milliGRAM(s) IV Push every 6 hours PRN Nausea and/or Vomiting      T(C): 35.8 (09-22-23 @ 04:48), Max: 35.8 (09-22-23 @ 04:48)  HR: 72 (09-22-23 @ 10:45) (60 - 72)  BP: 110/67 (09-22-23 @ 10:45) (110/67 - 143/65)  RR: 16 (09-22-23 @ 10:45) (16 - 18)  SpO2: 98% (09-22-23 @ 10:45) (96% - 99%)  Wt(kg): --Vital Signs Last 24 Hrs  T(C): 35.8 (22 Sep 2023 04:48), Max: 35.8 (22 Sep 2023 04:48)  T(F): 96.4 (22 Sep 2023 04:48), Max: 96.4 (22 Sep 2023 04:48)  HR: 72 (22 Sep 2023 10:45) (60 - 72)  BP: 110/67 (22 Sep 2023 10:45) (110/67 - 143/65)  BP(mean): 84 (22 Sep 2023 10:45) (83 - 93)  RR: 16 (22 Sep 2023 10:45) (16 - 18)  SpO2: 98% (22 Sep 2023 10:45) (96% - 99%)    Parameters below as of 22 Sep 2023 10:45  Patient On (Oxygen Delivery Method): room air      Oxygen Saturation Index= Unable to calculate   [Based on FiO2 = Unknown, SpO2 = 98(09/22/2023 10:45), MAP = Unknown]  Daily     Daily     PHYSICAL EXAM:  EYES: scleral icterus  NERVOUS SYSTEM:  Alert & Oriented X 3, mumbling but coherent, more responsive  CHEST/LUNG: CTAB; No rales, rhonchi, wheezing, or rubs  HEART: Regular rate and rhythm; No murmurs, rubs, or gallops  ABDOMEN: Soft, Nontender, distended with decreased bowel sounds; minimal ascites appreciated; FABIANO drain with minimal brownish/purulent output and PTC capped;  EXTREMITIES: No edema  SKIN: jaundice    CAPILLARY BLOOD GLUCOSE      POCT Blood Glucose.: 122 mg/dL (22 Sep 2023 06:07)  POCT Blood Glucose.: 115 mg/dL (21 Sep 2023 22:32)  POCT Blood Glucose.: 107 mg/dL (21 Sep 2023 16:12)  POCT Blood Glucose.: 102 mg/dL (21 Sep 2023 11:27)      LABS:  CBC   09-22-23 @ 06:01  Hematcorit 26.0  Hemoglobin 8.4  Mean Cell Hemoglobin 32.1  Platelet Count-Automated 139  RBC Count 2.62  Red Cell Distrib Width 21.8  Wbc Count 11.03    BMP  09-22-23 @ 06:01  Anion Gap. Serum 8  Blood Urea Nitrogen,Serm 34  Calcium, Total Serum 8.5  Carbon Dioxide, Serum 21  Chloride, Serum 114  Creatinine, Serum 1.11  eGFR in  --  eGFR in Non Afican American --  Gloucose, serum 121  Potassium, Serum 4.2  Sodium, Serum 143    09-21-23 @ 06:00  Anion Gap. Serum 9  Blood Urea Nitrogen,Serm 31  Calcium, Total Serum 8.6  Carbon Dioxide, Serum 20  Chloride, Serum 110  Creatinine, Serum 1.01  eGFR in  --  eGFR in Non Afican American --  Gloucose, serum 116  Potassium, Serum 4.3  Sodium, Serum 139    09-20-23 @ 06:06  Anion Gap. Serum 11  Blood Urea Nitrogen,Serm 31  Calcium, Total Serum 8.4  Carbon Dioxide, Serum 20  Chloride, Serum 108  Creatinine, Serum 1.08  eGFR in  --  eGFR in Non Afican American --  Gloucose, serum 110  Potassium, Serum 3.9  Sodium, Serum 139    CMP  09-22-23 @ 06:01  Komal Aminotransferase(ALT/SGPT)38  Albumin, Serum 1.9  Alkaline Phosphatase, Serum 293  Anion Gap, Serum 8  Aspartate Aminotransferase (AST/SGOT)158  Bilirubin Total, Serum 9.7  Blood Urea Nitrogen, Serum 34  Calcium,Total Serum 8.5  Carbon Dioxide, Serum 21  Chloride, Serum 114  Creatinine, Serum 1.11  eGFR if  --  eGFR if Non African American --  Glucose, Serum 121  Potassium, Serum 4.2  Protein Total, Serum 5.9  Sodium, Serum 143    09-21-23 @ 06:00  Komal Aminotransferase(ALT/SGPT)32  Albumin, Serum 1.8  Alkaline Phosphatase, Serum 301  Anion Gap, Serum 9  Aspartate Aminotransferase (AST/SGOT)112  Bilirubin Total, Serum 10.8  Blood Urea Nitrogen, Serum 31  Calcium,Total Serum 8.6  Carbon Dioxide, Serum 20  Chloride, Serum 110  Creatinine, Serum 1.01  eGFR if  --  eGFR if Non African American --  Glucose, Serum 116  Potassium, Serum 4.3  Protein Total, Serum 6.1  Sodium, Serum 139    09-20-23 @ 06:06  Komal Aminotransferase(ALT/SGPT)30  Albumin, Serum 2.0  Alkaline Phosphatase, Serum 294  Anion Gap, Serum 11  Aspartate Aminotransferase (AST/SGOT)90  Bilirubin Total, Serum 11.2  Blood Urea Nitrogen, Serum 31  Calcium,Total Serum 8.4  Carbon Dioxide, Serum 20  Chloride, Serum 108  Creatinine, Serum 1.08  eGFR if  --  eGFR if Non African American --  Glucose, Serum 110  Potassium, Serum 3.9  Protein Total, Serum 6.1  Sodium, Serum 139

## 2023-09-22 NOTE — PROGRESS NOTE ADULT - SUBJECTIVE AND OBJECTIVE BOX
68M with PMHx of HTN, GERD and recent diagnosis of stage IV pancreatic cancer with numerous liver metastasis who presents to [facility] from home for second opinion regarding treatmnet options and progressive fatigue and weakness, as well as anorexia 2/2 poor appetite. Now s/p PTC placement and drainage of 2.5L of clear, yellow ascites (9/12) now s/p Tube check wnl and PTC internalized on 9/19. as noted mental status change with normal ammonia level.  Course in hospital followed. Was AOx3 on  admit. per Neuro better reed yesterday. but this am said it is September 24 but does not know where he is. He thinks he is home.    Pt seen and examined     REVIEW OF SYSTEMS:  Constitutional: No fever,  Cardiovascular: No chest pain, palpitations,   Gastrointestinal: No abdominal or epigastric pain. No nausea, vomiting ; No diarrhea   Skin: No itching, burning, rashes or lesions       MEDICATIONS:  MEDICATIONS  (STANDING):  amLODIPine   Tablet 10 milliGRAM(s) Oral daily  bisacodyl Suppository 10 milliGRAM(s) Rectal every 24 hours  carvedilol 6.25 milliGRAM(s) Oral every 12 hours  chlorhexidine 2% Cloths 1 Application(s) Topical <User Schedule>  DAPTOmycin IVPB 800 milliGRAM(s) IV Intermittent every 24 hours  dextrose 5%. 1000 milliLiter(s) (50 mL/Hr) IV Continuous <Continuous>  dextrose 5%. 1000 milliLiter(s) (100 mL/Hr) IV Continuous <Continuous>  dextrose 50% Injectable 12.5 Gram(s) IV Push once  dextrose 50% Injectable 25 Gram(s) IV Push once  dextrose 50% Injectable 25 Gram(s) IV Push once  enoxaparin Injectable 40 milliGRAM(s) SubCutaneous every 24 hours  fluconAZOLE IVPB 200 milliGRAM(s) IV Intermittent every 24 hours  gabapentin 300 milliGRAM(s) Oral two times a day  glucagon  Injectable 1 milliGRAM(s) IntraMuscular once  influenza  Vaccine (HIGH DOSE) 0.7 milliLiter(s) IntraMuscular once  insulin lispro (ADMELOG) corrective regimen sliding scale   SubCutaneous three times a day before meals  insulin lispro (ADMELOG) corrective regimen sliding scale   SubCutaneous at bedtime  melatonin 5 milliGRAM(s) Oral at bedtime  pantoprazole  Injectable 40 milliGRAM(s) IV Push every 12 hours  polyethylene glycol 3350 17 Gram(s) Oral daily  sodium chloride 1 Gram(s) Oral two times a day  sucralfate suspension 1 Gram(s) Oral every 6 hours  urea Oral Powder 15 Gram(s) Oral daily    MEDICATIONS  (PRN):  dextrose Oral Gel 15 Gram(s) Oral once PRN Blood Glucose LESS THAN 70 milliGRAM(s)/deciliter  ondansetron Injectable 8 milliGRAM(s) IV Push every 6 hours PRN Nausea and/or Vomiting      Allergies    No Known Allergies    Intolerances        Vital Signs Last 24 Hrs  T(C): 35.8 (22 Sep 2023 04:48), Max: 35.8 (22 Sep 2023 04:48)  T(F): 96.4 (22 Sep 2023 04:48), Max: 96.4 (22 Sep 2023 04:48)  HR: 72 (22 Sep 2023 10:45) (60 - 72)  BP: 110/67 (22 Sep 2023 10:45) (110/67 - 143/65)  BP(mean): 84 (22 Sep 2023 10:45) (84 - 93)  RR: 16 (22 Sep 2023 10:45) (16 - 18)  SpO2: 98% (22 Sep 2023 10:45) (96% - 99%)    Parameters below as of 22 Sep 2023 10:45  Patient On (Oxygen Delivery Method): room air        09-21 @ 07:01 - 09-22 @ 07:00  --------------------------------------------------------  IN: 185 mL / OUT: 663 mL / NET: -478 mL    09-22 @ 07:01  -  09-22 @ 13:12  --------------------------------------------------------  IN: 400 mL / OUT: 85 mL / NET: 315 mL        PHYSICAL EXAM:    General:  in no acute distress  HEENT: MMM, conjunctiva and sclera icteric  Gastrointestinal: Soft non-tender  distended; Normal bowel sounds;  Skin: Warm and dry. No obvious rash    LABS:      CBC Full  -  ( 22 Sep 2023 06:01 )  WBC Count : 11.03 K/uL  RBC Count : 2.62 M/uL  Hemoglobin : 8.4 g/dL  Hematocrit : 26.0 %  Platelet Count - Automated : 139 K/uL  Mean Cell Volume : 99.2 fl  Mean Cell Hemoglobin : 32.1 pg  Mean Cell Hemoglobin Concentration : 32.3 gm/dL  Auto Neutrophil # : x  Auto Lymphocyte # : x  Auto Monocyte # : x  Auto Eosinophil # : x  Auto Basophil # : x  Auto Neutrophil % : x  Auto Lymphocyte % : x  Auto Monocyte % : x  Auto Eosinophil % : x  Auto Basophil % : x    09-22    143  |  114<H>  |  34<H>  ----------------------------<  121<H>  4.2   |  21<L>  |  1.11    Ca    8.5      22 Sep 2023 06:01  Phos  3.6     09-22  Mg     2.2     09-22    TPro  5.9<L>  /  Alb  1.9<L>  /  TBili  9.7<H>  /  DBili  7.3<H>  /  AST  158<H>  /  ALT  38  /  AlkPhos  293<H>  09-22          Urinalysis Basic - ( 22 Sep 2023 06:01 )    Color: x / Appearance: x / SG: x / pH: x  Gluc: 121 mg/dL / Ketone: x  / Bili: x / Urobili: x   Blood: x / Protein: x / Nitrite: x   Leuk Esterase: x / RBC: x / WBC x   Sq Epi: x / Non Sq Epi: x / Bacteria: x                RADIOLOGY & ADDITIONAL STUDIES (The following images were personally reviewed):

## 2023-09-22 NOTE — PROVIDER CONTACT NOTE (OTHER) - ACTION/TREATMENT ORDERED:
Asael Loya perform EKG and she is coming to assess patient.
No new order received. Will discuss with the team.
AIME Guillen made aware of agitation. No interventions required at this time
Continue to inform team of changes
MD Graves assessed pt at bedside. PT is A&O x4, abdomen is soft and nontender.
Reinforce per provider.     Cleansed and changed dressing. Will continue to monitor
Team 1 PA at bedside. EKG completed by PCA and reviewed by PA. Will continue to monitor.
Contacted MD Graves, started pt on D5 1/2NS @ 140ml/hr as ordered,  will continue to monitor.
Asael Loya (Team 1) will given medication IV.
PA made aware of current disorientation & apparent hallucination. No interventions required at this time.
Contacted MD Graves who assessed pt at bedside.
Team notified RN that they will contact cardiology. metoprolol 25 mg administered orally.
Per Shalini, no intervention continue to monitor and report any new changes.

## 2023-09-22 NOTE — PROVIDER CONTACT NOTE (OTHER) - NAME OF MD/NP/PA/DO NOTIFIED:
Shalini (Team 1)
Lynn Graves MD
MD Star
Lynn Graves MD
Adriana
Brittney SALOMON
Denise SALOMON
Team 1
Team 1 ( Jennifer
Laila Beckford MD
Shalini (Team 1)
Team 1 PA
Team 1 PA

## 2023-09-22 NOTE — PROGRESS NOTE ADULT - ASSESSMENT
Impression     History of pancreatic adenocarcinoma complicated by extensive bilobar hepatic   metastatic disease status post biliary stenting x 2 presents with MDR hepatic   subcapsular abscess status post PTC drain - cultures positive for ESBL Klebsiella  Klebsiella pneumoniae as well as Enterococcus faecium (VRE)      Plan    1. Continue meropenem 1000 mg IV three times daily   2. Continue daptomycin 800 mg IV daily - monitor Cpk   3. Continue fluconazole 200 mg IV daily whilst inpatient   4. Continue posterior IR drain and status post internalized PTC drain   5. Upon discharge transition antibiotics to linezolid 600 mg by mouth twice   daily and moxifloxacin 400 mg by mouth daily x 14 days - will avoid further  fluconazole given risk of qTc prolongation with fluoroquinolone     Thank you kindly for this referral.  The patient has been discussed with   medicine consulting earlier.    Alex Umanzor MD  [phone]

## 2023-09-22 NOTE — PROGRESS NOTE ADULT - ASSESSMENT
68M with PMHx of HTN, GERD and recent diagnosis of stage IV pancreatic cancer with numerous liver metastasis who presents to [facility] from home for second opinion regarding treatmnet options and progressive fatigue and weakness, as well as anorexia 2/2 poor appetite. Now s/p PTC placement and drainage of 2.5L of clear, yellow ascites (9/12) now s/p Tube check wnl and PTC internalized on 9/19.    CLD ADAT/IVF  Pain/nausea control PRN  Lactulose PO  Miralax bowel reg  IV meropenem (9/15-) and daptomycin (9/15-) fluconazole (9/16-)  Coreg 6.25mg bid  PPI BID  SCDs/IS/OOB  Abd US  R posterior IR drain (hepatic abscess)  Anterior PTC drain, internalized and capped  Dispo: PT recs MARIE, but pt refused

## 2023-09-23 NOTE — PROGRESS NOTE ADULT - ASSESSMENT
[FreeTextEntry1] : Cough likely related to LPR or cough variant asthma. less likely upper airway. Other etiologies unlikely.\par \par On tx H Pylori just started. 68M with PMHx of HTN, GERD and recent diagnosis of stage IV pancreatic cancer with numerous liver metastasis presented to [facility] and diagnosed with stage IV adenocarcinoma, s/p liver abscess drainage x 6 8/15, s/p ERCp with two stent placement as per records, getting paracentesis with possibly PCT drain placement, history of klebsiella bacteremia from outpatient basis , patient pending upgrade to ICU for monitoring, biliary drain placed, 2.5 L fluid drained which was clear and yellow, hypotensive during procedure, 2 FFPs given pre-op, biliary tree stents visualized and in place,  now s/p Tube check wnl and PTC internalized on 9/19 with worsening encephalopathy.    Encephalopathy - toxic vs. metabolic vs. infectious vs. hepatic vs. hypertensive vs. hypoxic vs. uremic vs. vascular.  Neurology consulted and it is believed to be metabolic in nature. CT Head ruled out CVA or any other overt etiology. Given poor prognosis, appropriate to have palliative care/hospice consulted. Unlikely to be infectious as patient also has been on numerous antibiotics for a stable period of time. Ammonia level was normal although after Lactulose, patient's mental status did improve - c/t monitor bowel movements and remain cognizant of adverse diarrhea. We would recommend holding any sedating agents that could worsen his AMS. Appreciate Medicine consult and surgery to remain primary team.    Mild euvolemic hyponatremia which did not auto correcting due to poor oral intake and liver disease - resolved on BID Salt tablets, Urea daily; Isotonic fluids can be continued for nutrition status, if poor nutrition continues; if mental status worsens, consider alternative forms of nutrition ( parenteral / NGT )   Hypoalbuminemia can lead to worsening third spacing, would recommend IV albumin infusion as needed  PRN FFPS as needed pre op for elevated coag levels   IR s/p paracentesis with PTC drain placement, now capped; c/t monitor LFTs  acute kidney injury on CKD - resolved   peritoneal cultures / blood culture - NGTD   Bile culture -VRE, klebsiella, citrobacter, candida   ID evaluated - c/w Meropenem, Fluconazole, Daptomycin for ESBL Klebsiella and VRE hepatic abscess; appreciate recs - will defer to hospice care team once transferred  normocytic anemia s/p PRBCs transfusions, HB stable   dvt ppx as per surgery   HTN- BP better controlled; c/w Coreg and Norvasc    Thank you for this consultation and we will continue to follow.

## 2023-09-23 NOTE — CONSULT NOTE ADULT - PROBLEM SELECTOR RECOMMENDATION 3
- Waxing and waning sensorium.  - Likely multifactorial in the setting of advanced malignancy, suspected infection and delirium.  - Supportive care.  - Ativan 0.5 mg IV Q6 hours prn agitation.

## 2023-09-23 NOTE — CONSULT NOTE ADULT - SUBJECTIVE AND OBJECTIVE BOX
[facility] and Palliative [facility]  Randy Estrella Palliative Care Attending  Contact Info: Call [phone] (HEAL Line) or message on Microsoft Teams    HPI:  Patient is a 68M with PMHx of HTN, GERD and recent diagnosis of stage IV pancreatic cancer with numerous liver metastasis who presents to [facility] from home for second opinion regarding treatmnet options. Patient reports he was in good health prior when he developed painless jaundice in the end of July with darkening urine and acholic stools. States he initially presented to [facility] and was diagnosed with stage IV adenocarcinoma. Reports he underwent ERCP with 2 biliary stents placed and was discharged to follow up with Dr. Ayala (oncologist at [facility]). Reports at presentation later in August to outpatient oncologist office, patient was noted to be hypotensive and febrile c/f cholangitis requiring hospital admission for posterior segment 6 liver abscess that was drained percutaneously (8/15). Reports additional endoscopy was performed that time and patient is unsure if stents were exchanged (per ERCP report, stents are 7Fr and occupy R anterior and posterior hepatic ducts). States he was admitted for 2-3 weeks and reports upon discharged he followed up with Dr. Goldberg on Thursday for second opinion. Patient reports today that he has been experiencing progressive fatigue and weakness, as well as anorexia 2/2 poor appetite. States he has become weak to the point where he cannot walk on his own and requires a cane. States he is now presenting to [facility] at referral of a friend requesting consultation with Dr. Gorman.     As per verbal report from outpatient oncologist, patient has had positive blood cultures for Klebsiella.     Last colonoscopy noted to be >5 years ago, but within normal limits.  Admits to family history of colon ca and leukemia in paternal grandfather. Denies family history of pancreatic cancer.    Medical History: HTN, GERD, pancreatic ca  Surgical History: L hip surgery  Medications: Coreg 12.5 qd, Pantoprazole 40 qd  Allergies: Denies, NKDA  Social History: 50+ pack year smoking history - quit 4 years ago. Denies alcohol or additional drug use. 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    In the ED, patient jaundiced and ill-appearing:  -VITALS: Afebrile T 98.3F, HR 74, /61, RR 18 - saturating well on RA  -LABORATORY: WBC 7.5 with neutrophil predominance, Hb 7.7, T bili noted to be 12.2, INR 1.65, C 1.64, , , ALT 54  -OUTPATIENT LAB WORK 9/5: , CA 19-9 [id number]  -IMAGING: CT scan with IV contrast in the ED with numerous hepatic metastasis (10 Sep 2023 14:45)    PERTINENT PM/SXH:   Pancreatic cancer metastasized to liver      S/P hip replacement, left      FAMILY HISTORY:    ITEMS NOT CHECKED ARE NOT PRESENT    SOCIAL HISTORY:   Significant other/partner:  [x]  Children:  []   Substance hx:  []   Tobacco hx:  [x]   Alcohol hx: []   Home Opioid hx:  [] I-Stop Reference No:  - no active Rx's / see chart note  Living Situation: [x]Home  []Long term care  []Rehab []Other  Scientologist/Spiritual practice: ; Role of organized Protestant [ ] important [ x] some [ ] unable to assess  Coping: [ ] well [ x] with difficulty [ ] poor coping [ ] unable to assess  Support system: [ ] strong [ x] adequate [ ] inadequate    ADVANCE DIRECTIVES:    []MOLST  []Living Will  DECISION MAKER(s):  [] Health Care Proxy(s)  [] Surrogate(s)  [] Guardian           Name(s)/Phone Number(s):     BASELINE (I)ADLs (prior to admission):  [place]: []Total  [x] Moderate []Dependent    ALLERGIES:  No Known Allergies    MEDICATIONS  (STANDING):    MEDICATIONS  (PRN):    PRESENT SYMPTOMS: [x]Unable to obtain due to poor mentation/encephalopathy  Source if other than patient:  []Family   []Team     Pain: [ ] yes [x ] no  QOL impact -   Location -                    Aggravating Factors -  Quality -  Radiation -  Timing -  Severity (0-10 scale) -   Minimal Acceptable Level (0-10 scale) -    PAIN AD Score:  [email or web address] (press ctrl +  left click to view)    Dyspnea:                           [x ]Mild  [ ]Moderate [ ]Severe  Anxiety:                             [ ]Mild [x ]Moderate [ ]Severe  Fatigue:                             [ ]Mild [ ]Moderate [x ]Severe  Nausea:                             [ ]Mild [ ]Moderate [ ]Severe  Loss of Appetite:             [ ]Mild [ ]Moderate [x ]Severe  Constipation:                   [ ]Mild [ x]Moderate [ ]Severe    Other Symptoms:  [x ]All other review of systems negative     Palliative Performance Status Version 2:   30 %    [email or web address]    PHYSICAL EXAM:  GENERAL:  [ ]Alert  [ ]Oriented x   [x ]Lethargic  [ ]Cachexia  [ ]Unarousable  [ ]Verbal  [ ]Non-Verbal  Behavioral:   [ ]Anxiety  [ ]Delirium [x ]Agitation [ ]Cooperative  HEENT:  [ ]Normal   [x ]Dry mouth   [ ]ET Tube/Trach  [ ]Oral lesions  PULMONARY:   [ ]Clear []Tachypnea  []Audible excessive secretions   [x ]Rhonchi        [ ]Right [ ]Left [ x]Bilateral  [ ]Crackles        [ ]Right [ ]Left [ ]Bilateral  [ ]Wheezing     [ ]Right [ ]Left [ ]Bilateral  CARDIOVASCULAR:    [ x]Regular [ ]Irregular [ ]Tachy  [ ]Maxi [ ]Murmur [ ]Other  GASTROINTESTINAL:  [ x]Soft  [ ]Distended   [x ]+BS  [ ]Non tender [ ]Tender  [ ]PEG [ ]OGT/ NGT  Last BM:     GENITOURINARY:  [ ]Normal [ ] Incontinent   [ ]Oliguria/Anuria   [ x]Glass  MUSCULOSKELETAL:   [ ]Normal   [ ]Weakness  [x ]Bed/Wheelchair bound [ ]Edema  NEUROLOGIC:   [ ]No focal deficits  [x ]Cognitive impairment  [ ]Dysphagia [ ]Dysarthria [ ]Paresis [ ]Encephalopathic   SKIN:   [x ]Normal   [ ]Pressure ulcer(s)  [ ]Rash    CRITICAL CARE:  [ ]Shock Present  [ ]Septic [ ]Cardiogenic [ ]Neurologic [ ]Hypovolemic  [ ]Vasopressors [ ]Inotropes   [ ]Respiratory failure present [ ]Mechanical Ventilation [ ]Non-invasive ventilatory support [ ]High-Flow  [ ]Acute  [ ]Chronic [ ]Hypoxic  [ ]Hypercarbic  [ ]Other organ failure    Vital Signs Last 24 Hrs  T(C): 36.3 (23 Sep 2023 09:00), Max: 36.9 (22 Sep 2023 22:00)  T(F): 97.3 (23 Sep 2023 09:00), Max: 98.5 (22 Sep 2023 22:00)  HR: 60 (23 Sep 2023 08:40) (56 - 62)  BP: 147/71 (23 Sep 2023 08:40) (105/59 - 158/70)  BP(mean): 101 (23 Sep 2023 08:40) (80 - 109)  RR: 20 (23 Sep 2023 08:40) (16 - 20)  SpO2: 98% (23 Sep 2023 08:40) (95% - 99%)    Parameters below as of 23 Sep 2023 08:40  Patient On (Oxygen Delivery Method): room air     I&O's Summary    22 Sep 2023 07:01  -  23 Sep 2023 07:00  --------------------------------------------------------  IN: 690 mL / OUT: 340 mL / NET: 350 mL    23 Sep 2023 07:01  -  23 Sep 2023 11:07  --------------------------------------------------------  IN: 0 mL / OUT: 15 mL / NET: -15 mL        LABS:                        8.6    12.67 )-----------( 136      ( 23 Sep 2023 07:36 )             26.5   09-23    141  |  113<H>  |  39<H>  ----------------------------<  133<H>  4.2   |  19<L>  |  1.40<H>    Ca    8.4      23 Sep 2023 07:36  Phos  3.7     09-23  Mg     2.3     09-23    TPro  6.0  /  Alb  1.9<L>  /  TBili  9.8<H>  /  DBili  7.4<H>  /  AST  267<H>  /  ALT  55<H>  /  AlkPhos  391<H>  09-23    Urinalysis Basic - ( 23 Sep 2023 07:36 )    Color: x / Appearance: x / SG: x / pH: x  Gluc: 133 mg/dL / Ketone: x  / Bili: x / Urobili: x   Blood: x / Protein: x / Nitrite: x   Leuk Esterase: x / RBC: x / WBC x   Sq Epi: x / Non Sq Epi: x / Bacteria: x      RADIOLOGY & ADDITIONAL STUDIES:      PROTEIN CALORIE MALNUTRITION PRESENT: [ ]mild [ ]moderate [ ]severe [ ]underweight [ ]morbid obesity  [ ]PPSV2 < or = to 30% [ ]significant weight loss  [ ]poor nutritional intake [ ]catabolic state [ ]anasarca     Artificial Nutrition [ ]     REFERRALS:  [x]Social Work  [ ]Case management [ ]PT/OT [ ]Chaplaincy  [ ]Hospice  [ ]Patient/Family Support    DISCUSSION OF CASE: Family - to obtain additional history and to provide emotional support; ( ) -

## 2023-09-23 NOTE — PROGRESS NOTE ADULT - SUBJECTIVE AND OBJECTIVE BOX
SUBJECTIVE:      MEDICATIONS  (STANDING):  amLODIPine   Tablet 10 milliGRAM(s) Oral daily  bisacodyl Suppository 10 milliGRAM(s) Rectal every 24 hours  carvedilol 6.25 milliGRAM(s) Oral every 12 hours  chlorhexidine 2% Cloths 1 Application(s) Topical <User Schedule>  DAPTOmycin IVPB 800 milliGRAM(s) IV Intermittent every 24 hours  dextrose 5%. 1000 milliLiter(s) (50 mL/Hr) IV Continuous <Continuous>  dextrose 5%. 1000 milliLiter(s) (100 mL/Hr) IV Continuous <Continuous>  dextrose 50% Injectable 12.5 Gram(s) IV Push once  dextrose 50% Injectable 25 Gram(s) IV Push once  dextrose 50% Injectable 25 Gram(s) IV Push once  enoxaparin Injectable 40 milliGRAM(s) SubCutaneous every 24 hours  fluconAZOLE IVPB 200 milliGRAM(s) IV Intermittent every 24 hours  gabapentin 300 milliGRAM(s) Oral two times a day  glucagon  Injectable 1 milliGRAM(s) IntraMuscular once  influenza  Vaccine (HIGH DOSE) 0.7 milliLiter(s) IntraMuscular once  insulin lispro (ADMELOG) corrective regimen sliding scale   SubCutaneous three times a day before meals  insulin lispro (ADMELOG) corrective regimen sliding scale   SubCutaneous at bedtime  melatonin 5 milliGRAM(s) Oral at bedtime  pantoprazole  Injectable 40 milliGRAM(s) IV Push every 12 hours  polyethylene glycol 3350 17 Gram(s) Oral daily  sodium chloride 1 Gram(s) Oral two times a day  sucralfate suspension 1 Gram(s) Oral every 6 hours  urea Oral Powder 15 Gram(s) Oral daily    MEDICATIONS  (PRN):  dextrose Oral Gel 15 Gram(s) Oral once PRN Blood Glucose LESS THAN 70 milliGRAM(s)/deciliter  ondansetron Injectable 8 milliGRAM(s) IV Push every 6 hours PRN Nausea and/or Vomiting      Vital Signs Last 24 Hrs  T(C): 36.9 (22 Sep 2023 22:00), Max: 36.9 (22 Sep 2023 22:00)  T(F): 98.5 (22 Sep 2023 22:00), Max: 98.5 (22 Sep 2023 22:00)  HR: 62 (23 Sep 2023 04:06) (56 - 72)  BP: 158/70 (23 Sep 2023 04:06) (105/59 - 158/70)  BP(mean): 101 (23 Sep 2023 04:06) (80 - 109)  RR: 17 (23 Sep 2023 04:06) (16 - 17)  SpO2: 99% (23 Sep 2023 04:06) (95% - 99%)    Parameters below as of 23 Sep 2023 04:06  Patient On (Oxygen Delivery Method): room air        Physical Exam:  General: NAD, resting comfortably in bed  Pulmonary: Nonlabored breathing, no respiratory distress  Cardiovascular: NSR  Abdominal: soft, NT/ND  Extremities: WWP, normal strength  Neuro: A/O x 3, CNs II-XII grossly intact, no focal deficits    I&O's Summary    21 Sep 2023 07:01  -  22 Sep 2023 07:00  --------------------------------------------------------  IN: 185 mL / OUT: 663 mL / NET: -478 mL    22 Sep 2023 07:01  -  23 Sep 2023 06:40  --------------------------------------------------------  IN: 690 mL / OUT: 265 mL / NET: 425 mL        LABS:                        8.1    11.21 )-----------( 147      ( 22 Sep 2023 13:35 )             24.9     09-22    143  |  114<H>  |  36<H>  ----------------------------<  141<H>  4.1   |  20<L>  |  1.21    Ca    8.1<L>      22 Sep 2023 13:35  Phos  3.6     09-22  Mg     2.2     09-22    TPro  5.7<L>  /  Alb  1.8<L>  /  TBili  9.9<H>  /  DBili  x   /  AST  187<H>  /  ALT  41  /  AlkPhos  313<H>  09-22    PT/INR - ( 22 Sep 2023 13:35 )   PT: 19.0 sec;   INR: 1.69          PTT - ( 22 Sep 2023 13:35 )  PTT:43.3 sec  Urinalysis Basic - ( 22 Sep 2023 13:35 )    Color: x / Appearance: x / SG: x / pH: x  Gluc: 141 mg/dL / Ketone: x  / Bili: x / Urobili: x   Blood: x / Protein: x / Nitrite: x   Leuk Esterase: x / RBC: x / WBC x   Sq Epi: x / Non Sq Epi: x / Bacteria: x      CAPILLARY BLOOD GLUCOSE      POCT Blood Glucose.: 124 mg/dL (23 Sep 2023 05:49)  POCT Blood Glucose.: 134 mg/dL (23 Sep 2023 00:12)  POCT Blood Glucose.: 127 mg/dL (22 Sep 2023 13:42)  POCT Blood Glucose.: 138 mg/dL (22 Sep 2023 11:25)    LIVER FUNCTIONS - ( 22 Sep 2023 13:35 )  Alb: 1.8 g/dL / Pro: 5.7 g/dL / ALK PHOS: 313 U/L / ALT: 41 U/L / AST: 187 U/L / GGT: x             RADIOLOGY & ADDITIONAL STUDIES:   SUBJECTIVE:  Pt seen this AM on rounds. Pt endorses feeling well o/n. -n/-v    MEDICATIONS  (STANDING):  amLODIPine   Tablet 10 milliGRAM(s) Oral daily  bisacodyl Suppository 10 milliGRAM(s) Rectal every 24 hours  carvedilol 6.25 milliGRAM(s) Oral every 12 hours  chlorhexidine 2% Cloths 1 Application(s) Topical <User Schedule>  DAPTOmycin IVPB 800 milliGRAM(s) IV Intermittent every 24 hours  dextrose 5%. 1000 milliLiter(s) (50 mL/Hr) IV Continuous <Continuous>  dextrose 5%. 1000 milliLiter(s) (100 mL/Hr) IV Continuous <Continuous>  dextrose 50% Injectable 12.5 Gram(s) IV Push once  dextrose 50% Injectable 25 Gram(s) IV Push once  dextrose 50% Injectable 25 Gram(s) IV Push once  enoxaparin Injectable 40 milliGRAM(s) SubCutaneous every 24 hours  fluconAZOLE IVPB 200 milliGRAM(s) IV Intermittent every 24 hours  gabapentin 300 milliGRAM(s) Oral two times a day  glucagon  Injectable 1 milliGRAM(s) IntraMuscular once  influenza  Vaccine (HIGH DOSE) 0.7 milliLiter(s) IntraMuscular once  insulin lispro (ADMELOG) corrective regimen sliding scale   SubCutaneous three times a day before meals  insulin lispro (ADMELOG) corrective regimen sliding scale   SubCutaneous at bedtime  melatonin 5 milliGRAM(s) Oral at bedtime  pantoprazole  Injectable 40 milliGRAM(s) IV Push every 12 hours  polyethylene glycol 3350 17 Gram(s) Oral daily  sodium chloride 1 Gram(s) Oral two times a day  sucralfate suspension 1 Gram(s) Oral every 6 hours  urea Oral Powder 15 Gram(s) Oral daily    MEDICATIONS  (PRN):  dextrose Oral Gel 15 Gram(s) Oral once PRN Blood Glucose LESS THAN 70 milliGRAM(s)/deciliter  ondansetron Injectable 8 milliGRAM(s) IV Push every 6 hours PRN Nausea and/or Vomiting      Vital Signs Last 24 Hrs  T(C): 36.9 (22 Sep 2023 22:00), Max: 36.9 (22 Sep 2023 22:00)  T(F): 98.5 (22 Sep 2023 22:00), Max: 98.5 (22 Sep 2023 22:00)  HR: 62 (23 Sep 2023 04:06) (56 - 72)  BP: 158/70 (23 Sep 2023 04:06) (105/59 - 158/70)  BP(mean): 101 (23 Sep 2023 04:06) (80 - 109)  RR: 17 (23 Sep 2023 04:06) (16 - 17)  SpO2: 99% (23 Sep 2023 04:06) (95% - 99%)    Parameters below as of 23 Sep 2023 04:06  Patient On (Oxygen Delivery Method): room air        Physical Exam:  General: NAD, resting comfortably in bed  Pulmonary: Nonlabored breathing, no respiratory distress  Cardiovascular: NSR  Abdominal: soft, NT/ND  Extremities: WWP, normal strength  Neuro: A/O x 3, CNs II-XII grossly intact, no focal deficits    I&O's Summary    21 Sep 2023 07:01  -  22 Sep 2023 07:00  --------------------------------------------------------  IN: 185 mL / OUT: 663 mL / NET: -478 mL    22 Sep 2023 07:01  -  23 Sep 2023 06:40  --------------------------------------------------------  IN: 690 mL / OUT: 265 mL / NET: 425 mL        LABS:                        8.1    11.21 )-----------( 147      ( 22 Sep 2023 13:35 )             24.9     09-22    143  |  114<H>  |  36<H>  ----------------------------<  141<H>  4.1   |  20<L>  |  1.21    Ca    8.1<L>      22 Sep 2023 13:35  Phos  3.6     09-22  Mg     2.2     09-22    TPro  5.7<L>  /  Alb  1.8<L>  /  TBili  9.9<H>  /  DBili  x   /  AST  187<H>  /  ALT  41  /  AlkPhos  313<H>  09-22    PT/INR - ( 22 Sep 2023 13:35 )   PT: 19.0 sec;   INR: 1.69          PTT - ( 22 Sep 2023 13:35 )  PTT:43.3 sec  Urinalysis Basic - ( 22 Sep 2023 13:35 )    Color: x / Appearance: x / SG: x / pH: x  Gluc: 141 mg/dL / Ketone: x  / Bili: x / Urobili: x   Blood: x / Protein: x / Nitrite: x   Leuk Esterase: x / RBC: x / WBC x   Sq Epi: x / Non Sq Epi: x / Bacteria: x      CAPILLARY BLOOD GLUCOSE      POCT Blood Glucose.: 124 mg/dL (23 Sep 2023 05:49)  POCT Blood Glucose.: 134 mg/dL (23 Sep 2023 00:12)  POCT Blood Glucose.: 127 mg/dL (22 Sep 2023 13:42)  POCT Blood Glucose.: 138 mg/dL (22 Sep 2023 11:25)    LIVER FUNCTIONS - ( 22 Sep 2023 13:35 )  Alb: 1.8 g/dL / Pro: 5.7 g/dL / ALK PHOS: 313 U/L / ALT: 41 U/L / AST: 187 U/L / GGT: x             RADIOLOGY & ADDITIONAL STUDIES:

## 2023-09-23 NOTE — CHART NOTE - NSCHARTNOTEFT_GEN_A_CORE
Spoke with wife Elissa. Would like to transition to full comfort care given poor prognosis. No abx, no blood draws, fingersticks, and would like comfort measures only. Interested in pursuing hospice care and would like to discuss further with palliative.

## 2023-09-23 NOTE — CONSULT NOTE ADULT - ASSESSMENT
68 M with stage IV pancreatic cancer, neoplasm related pain, encephalopathy, debility, encounter for palliative care.

## 2023-09-23 NOTE — PROGRESS NOTE ADULT - SUBJECTIVE AND OBJECTIVE BOX
Pt seen and examined   lethargic  not answering my questions  family by bedside    REVIEW OF SYSTEMS:  unable to obtain      MEDICATIONS:  MEDICATIONS  (STANDING):    MEDICATIONS  (PRN):  HYDROmorphone  Injectable 0.5 milliGRAM(s) IV Push every 4 hours PRN Severe Pain (7 - 10)  LORazepam   Injectable 0.5 milliGRAM(s) IV Push every 6 hours PRN Agitation  ondansetron Injectable 4 milliGRAM(s) IV Push every 6 hours PRN Nausea      Allergies    No Known Allergies    Intolerances        Vital Signs Last 24 Hrs  T(C): 36.7 (23 Sep 2023 12:20), Max: 36.9 (22 Sep 2023 22:00)  T(F): 98.1 (23 Sep 2023 12:20), Max: 98.5 (22 Sep 2023 22:00)  HR: 60 (23 Sep 2023 12:20) (56 - 62)  BP: 125/66 (23 Sep 2023 12:20) (105/59 - 163/72)  BP(mean): 103 (23 Sep 2023 11:42) (80 - 109)  RR: 16 (23 Sep 2023 12:20) (16 - 20)  SpO2: 97% (23 Sep 2023 12:20) (95% - 99%)    Parameters below as of 23 Sep 2023 12:20  Patient On (Oxygen Delivery Method): room air        09-22 @ 07:01  -  09-23 @ 07:00  --------------------------------------------------------  IN: 690 mL / OUT: 340 mL / NET: 350 mL    09-23 @ 07:01  -  09-23 @ 13:22  --------------------------------------------------------  IN: 0 mL / OUT: 15 mL / NET: -15 mL        PHYSICAL EXAM:    General:  in no acute distress  HEENT: MMM, conjunctiva +jaundice  Gastrointestinal: Soft  -distended; Normal bowel sounds; FABIANO  Skin: Warm and dry. No obvious rash    LABS:      CBC Full  -  ( 23 Sep 2023 07:36 )  WBC Count : 12.67 K/uL  RBC Count : 2.59 M/uL  Hemoglobin : 8.6 g/dL  Hematocrit : 26.5 %  Platelet Count - Automated : 136 K/uL  Mean Cell Volume : 102.3 fl  Mean Cell Hemoglobin : 33.2 pg  Mean Cell Hemoglobin Concentration : 32.5 gm/dL  Auto Neutrophil # : x  Auto Lymphocyte # : x  Auto Monocyte # : x  Auto Eosinophil # : x  Auto Basophil # : x  Auto Neutrophil % : x  Auto Lymphocyte % : x  Auto Monocyte % : x  Auto Eosinophil % : x  Auto Basophil % : x    09-23    141  |  113<H>  |  39<H>  ----------------------------<  133<H>  4.2   |  19<L>  |  1.40<H>    Ca    8.4      23 Sep 2023 07:36  Phos  3.7     09-23  Mg     2.3     09-23    TPro  6.0  /  Alb  1.9<L>  /  TBili  9.8<H>  /  DBili  7.4<H>  /  AST  267<H>  /  ALT  55<H>  /  AlkPhos  391<H>  09-23    PT/INR - ( 22 Sep 2023 13:35 )   PT: 19.0 sec;   INR: 1.69          PTT - ( 22 Sep 2023 13:35 )  PTT:43.3 sec      Urinalysis Basic - ( 23 Sep 2023 07:36 )    Color: x / Appearance: x / SG: x / pH: x  Gluc: 133 mg/dL / Ketone: x  / Bili: x / Urobili: x   Blood: x / Protein: x / Nitrite: x   Leuk Esterase: x / RBC: x / WBC x   Sq Epi: x / Non Sq Epi: x / Bacteria: x                RADIOLOGY & ADDITIONAL STUDIES (The following images were personally reviewed):

## 2023-09-23 NOTE — CONSULT NOTE ADULT - PROBLEM SELECTOR RECOMMENDATION 5
- Patient with stage IV advanced pancreatic cancer.  - No longer a candidate for disease targeted therapies.  - Prognosis is extremely poor.  - Patient with episodes of asystole on monitor and block.   - Goals of care have been ongoing, but now family is leaning towards a comfort based approach.  - DNR/DNI.  - MEWS exempt.  - Comfort care.  - Being transferred from [facility] as that is no longer in line with goals of care.  - Possible inpatient hospice Monday .

## 2023-09-23 NOTE — PROGRESS NOTE ADULT - ASSESSMENT
68M with PMHx of HTN, GERD and recent diagnosis of stage IV pancreatic cancer with numerous liver metastasis who presents to [facility] from home for second opinion regarding treatmnet options and progressive fatigue and weakness, as well as anorexia 2/2 poor appetite. Now s/p PTC placement and drainage of 2.5L of clear, yellow ascites (9/12) now s/p Tube check wnl and PTC internalized on 9/19.    CLD ADAT/IVF  Pain/nausea control PRN  Lactulose enema  Miralax bowel reg  IV meropenem (9/15-) and daptomycin (9/15-) fluconazole (9/16-)  Coreg 6.25mg bid  PPI BID  SCDs/IS/OOB  R posterior IR drain (hepatic abscess)  Anterior PTC drain, internalized and capped  Dispo: PT recs MARIE, but pt refused

## 2023-09-23 NOTE — PROGRESS NOTE ADULT - SUBJECTIVE AND OBJECTIVE BOX
68y old  Male who presents with a chief complaint of metastatic pancreatic ca now s/p PTC placement and drainage of 2.5L of clear, yellow ascites (9/12), now s/p Tube check wnl and PTC internalized and capped on 9/19.    This morning, he is lethargic but appropriate and coherent, although mumbling. ROS was limited as patient is still confused.    PAST MEDICAL/SURGICAL HISTORY  PAST MEDICAL & SURGICAL HISTORY:  Pancreatic cancer metastasized to liver  S/P hip replacement, left  MEDICATIONS  (STANDING):    MEDICATIONS  (PRN):  HYDROmorphone  Injectable 0.5 milliGRAM(s) IV Push every 4 hours PRN Severe Pain (7 - 10)  LORazepam   Injectable 0.5 milliGRAM(s) IV Push every 6 hours PRN Agitation  ondansetron Injectable 4 milliGRAM(s) IV Push every 6 hours PRN Nausea    ALLERY AND IMMUNOLOGIC: No hives or eczema    T(C): 36.7 (09-23-23 @ 12:20), Max: 36.9 (09-22-23 @ 22:00)  HR: 60 (09-23-23 @ 12:20) (56 - 62)  BP: 125/66 (09-23-23 @ 12:20) (105/59 - 163/72)  RR: 16 (09-23-23 @ 12:20) (16 - 20)  SpO2: 97% (09-23-23 @ 12:20) (95% - 99%)  Wt(kg): --Vital Signs Last 24 Hrs  T(C): 36.7 (23 Sep 2023 12:20), Max: 36.9 (22 Sep 2023 22:00)  T(F): 98.1 (23 Sep 2023 12:20), Max: 98.5 (22 Sep 2023 22:00)  HR: 60 (23 Sep 2023 12:20) (56 - 62)  BP: 125/66 (23 Sep 2023 12:20) (105/59 - 163/72)  BP(mean): 103 (23 Sep 2023 11:42) (80 - 109)  RR: 16 (23 Sep 2023 12:20) (16 - 20)  SpO2: 97% (23 Sep 2023 12:20) (95% - 99%)    Parameters below as of 23 Sep 2023 12:20  Patient On (Oxygen Delivery Method): room air      Oxygen Saturation Index= Unable to calculate   [Based on FiO2 = Unknown, SpO2 = 97(09/23/2023 12:20), MAP = Unknown]  Daily     Daily     PHYSICAL EXAM:  EYES: scleral icterus  NERVOUS SYSTEM:  Alert & Oriented X 2, to self and time, mumbling but coherent  CHEST/LUNG: CTAB; No rales, rhonchi, wheezing, or rubs  HEART: Regular rate and rhythm; No murmurs, rubs, or gallops  ABDOMEN: Soft, Nontender, distended with decreased bowel sounds; minimal ascites appreciated; FABIANO drain with minimal brownish/purulent output and PTC capped;  EXTREMITIES: No edema  SKIN: jaundice    CAPILLARY BLOOD GLUCOSE      POCT Blood Glucose.: 124 mg/dL (23 Sep 2023 05:49)  POCT Blood Glucose.: 134 mg/dL (23 Sep 2023 00:12)  POCT Blood Glucose.: 127 mg/dL (22 Sep 2023 13:42)      LABS:  CBC   09-23-23 @ 07:36  Hematcorit 26.5  Hemoglobin 8.6  Mean Cell Hemoglobin 33.2  Platelet Count-Automated 136  RBC Count 2.59  Red Cell Distrib Width 21.2  Wbc Count 12.67  09-22-23 @ 13:35  Hematcorit 24.9  Hemoglobin 8.1  Mean Cell Hemoglobin 32.8  Platelet Count-Automated 147  RBC Count 2.47  Red Cell Distrib Width 21.0  Wbc Count 11.21      BMP  09-23-23 @ 07:36  Anion Gap. Serum 9  Blood Urea Nitrogen,Serm 39  Calcium, Total Serum 8.4  Carbon Dioxide, Serum 19  Chloride, Serum 113  Creatinine, Serum 1.40  eGFR in  --  eGFR in Non Afican American --  Gloucose, serum 133  Potassium, Serum 4.2  Sodium, Serum 141    09-22-23 @ 13:35  Anion Gap. Serum 9  Blood Urea Nitrogen,Serm 36  Calcium, Total Serum 8.1  Carbon Dioxide, Serum 20  Chloride, Serum 114  Creatinine, Serum 1.21  eGFR in  --  eGFR in Non Afican American --  Gloucose, serum 141  Potassium, Serum 4.1  Sodium, Serum 143    09-22-23 @ 06:01  Anion Gap. Serum 8  Blood Urea Nitrogen,Serm 34  Calcium, Total Serum 8.5  Carbon Dioxide, Serum 21  Chloride, Serum 114  Creatinine, Serum 1.11  eGFR in  --  eGFR in Non Afican American --  Gloucose, serum 121  Potassium, Serum 4.2  Sodium, Serum 143    09-21-23 @ 06:00  Anion Gap. Serum 9  Blood Urea Nitrogen,Serm 31  Calcium, Total Serum 8.6  Carbon Dioxide, Serum 20  Chloride, Serum 110  Creatinine, Serum 1.01  eGFR in  --  eGFR in Non Afican American --  Gloucose, serum 116  Potassium, Serum 4.3  Sodium, Serum 139    CMP  09-23-23 @ 07:36  Komal Aminotransferase(ALT/SGPT)55  Albumin, Serum 1.9  Alkaline Phosphatase, Serum 391  Anion Gap, Serum 9  Aspartate Aminotransferase (AST/SGOT)267  Bilirubin Total, Serum 9.8  Blood Urea Nitrogen, Serum 39  Calcium,Total Serum 8.4  Carbon Dioxide, Serum 19  Chloride, Serum 113  Creatinine, Serum 1.40  eGFR if  --  eGFR if Non African American --  Glucose, Serum 133  Potassium, Serum 4.2  Protein Total, Serum 6.0  Sodium, Serum 141    09-22-23 @ 13:35  Komal Aminotransferase(ALT/SGPT)41  Albumin, Serum 1.8  Alkaline Phosphatase, Serum 313  Anion Gap, Serum 9  Aspartate Aminotransferase (AST/SGOT)187  Bilirubin Total, Serum 9.9  Blood Urea Nitrogen, Serum 36  Calcium,Total Serum 8.1  Carbon Dioxide, Serum 20  Chloride, Serum 114  Creatinine, Serum 1.21  eGFR if  --  eGFR if Non African American --  Glucose, Serum 141  Potassium, Serum 4.1  Protein Total, Serum 5.7  Sodium, Serum 143    09-22-23 @ 06:01  Komal Aminotransferase(ALT/SGPT)38  Albumin, Serum 1.9  Alkaline Phosphatase, Serum 293  Anion Gap, Serum 8  Aspartate Aminotransferase (AST/SGOT)158  Bilirubin Total, Serum 9.7  Blood Urea Nitrogen, Serum 34  Calcium,Total Serum 8.5  Carbon Dioxide, Serum 21  Chloride, Serum 114  Creatinine, Serum 1.11  eGFR if  --  eGFR if Non African American --  Glucose, Serum 121  Potassium, Serum 4.2  Protein Total, Serum 5.9  Sodium, Serum 143    09-21-23 @ 06:00  Komal Aminotransferase(ALT/SGPT)32  Albumin, Serum 1.8  Alkaline Phosphatase, Serum 301  Anion Gap, Serum 9  Aspartate Aminotransferase (AST/SGOT)112  Bilirubin Total, Serum 10.8  Blood Urea Nitrogen, Serum 31  Calcium,Total Serum 8.6  Carbon Dioxide, Serum 20  Chloride, Serum 110  Creatinine, Serum 1.01  eGFR if  --  eGFR if Non African American --  Glucose, Serum 116  Potassium, Serum 4.3  Protein Total, Serum 6.1  Sodium, Serum 139    PT/INR  PT/INR  09-22-23 @ 13:35  INR 1.69  Prothrombin Time Comment --  Prothrobin Time, [id number]

## 2023-09-23 NOTE — CONSULT NOTE ADULT - CONSULT REASON
Caro hepatic abscess
Postoperative HD monitoring
medical co management
AMS
Bradycardia
Stage IV pancreatic cancer  End of life care

## 2023-09-23 NOTE — CONSULT NOTE ADULT - PROBLEM SELECTOR RECOMMENDATION 9
- Stage IV metastatic disease.  - Extensive liver mets.   - S/p PTC placement and drainage of ascites.  - S/p internalization of PTC.  - No longer a candidate for further disease targeted therapies.  - Hospice services are appropriate. - Stage IV metastatic disease.  - Extensive liver mets.   - S/p PTC placement and drainage of ascites.  - S/p internalization of PTC.  - No longer a candidate for further disease targeted therapies.  - Hospice services are appropriate.  - Zofran 4mg IV Q6 hours prn nausea.

## 2023-09-23 NOTE — CONSULT NOTE ADULT - REASON FOR ADMISSION
metastatic pancreatic ca

## 2023-09-24 NOTE — PROGRESS NOTE ADULT - SUBJECTIVE AND OBJECTIVE BOX
68y old  Male who presents with a chief complaint of metastatic pancreatic ca now s/p PTC placement and drainage of 2.5L of clear, yellow ascites (9/12), now s/p Tube check wnl and PTC internalized and capped on 9/19.    This morning, he is lethargic. ROS was limited as patient is still confused.    PAST MEDICAL/SURGICAL HISTORY  PAST MEDICAL & SURGICAL HISTORY:  Pancreatic cancer metastasized to liver  S/P hip replacement, left    MEDICATIONS  (STANDING):    MEDICATIONS  (PRN):  HYDROmorphone  Injectable 0.5 milliGRAM(s) IV Push every 4 hours PRN Severe Pain (7 - 10)  LORazepam   Injectable 0.5 milliGRAM(s) IV Push every 6 hours PRN Agitation  ondansetron Injectable 4 milliGRAM(s) IV Push every 6 hours PRN Nausea    T(C): 37.2 (09-24-23 @ 12:25), Max: 37.2 (09-24-23 @ 06:03)  HR: 62 (09-24-23 @ 12:25) (58 - 62)  BP: 121/72 (09-24-23 @ 12:25) (119/71 - 121/72)  RR: 15 (09-24-23 @ 12:25) (14 - 15)  SpO2: 96% (09-24-23 @ 12:25) (95% - 96%)  Wt(kg): --Vital Signs Last 24 Hrs  T(C): 37.2 (24 Sep 2023 12:25), Max: 37.2 (24 Sep 2023 06:03)  T(F): 99 (24 Sep 2023 12:25), Max: 99 (24 Sep 2023 06:03)  HR: 62 (24 Sep 2023 12:25) (58 - 62)  BP: 121/72 (24 Sep 2023 12:25) (119/71 - 121/72)  BP(mean): --  RR: 15 (24 Sep 2023 12:25) (14 - 15)  SpO2: 96% (24 Sep 2023 12:25) (95% - 96%)    Parameters below as of 24 Sep 2023 12:25  Patient On (Oxygen Delivery Method): room air      Oxygen Saturation Index= Unable to calculate   [Based on FiO2 = Unknown, SpO2 = 96(09/24/2023 12:25), MAP = Unknown]  Daily     Daily     PHYSICAL EXAM:  EYES: scleral icterus  NERVOUS SYSTEM:  Alert & Oriented X 0, lethargic  CHEST/LUNG: CTAB; No rales, rhonchi, wheezing, or rubs  HEART: Regular rate and rhythm; No murmurs, rubs, or gallops  ABDOMEN: Soft, Nontender, distended with decreased bowel sounds; minimal ascites appreciated; FABIANO drain with minimal brownish/purulent output and PTC capped;  EXTREMITIES: No edema  SKIN: jaundice    CAPILLARY BLOOD GLUCOSE          LABS:  CBC       BMP  09-23-23 @ 07:36  Anion Gap. Serum 9  Blood Urea Nitrogen,Serm 39  Calcium, Total Serum 8.4  Carbon Dioxide, Serum 19  Chloride, Serum 113  Creatinine, Serum 1.40  eGFR in  --  eGFR in Non Afican American --  Gloucose, serum 133  Potassium, Serum 4.2  Sodium, Serum 141    09-22-23 @ 13:35  Anion Gap. Serum 9  Blood Urea Nitrogen,Serm 36  Calcium, Total Serum 8.1  Carbon Dioxide, Serum 20  Chloride, Serum 114  Creatinine, Serum 1.21  eGFR in  --  eGFR in Non Afican American --  Gloucose, serum 141  Potassium, Serum 4.1  Sodium, Serum 143    09-22-23 @ 06:01  Anion Gap. Serum 8  Blood Urea Nitrogen,Serm 34  Calcium, Total Serum 8.5  Carbon Dioxide, Serum 21  Chloride, Serum 114  Creatinine, Serum 1.11  eGFR in  --  eGFR in Non Afican American --  Gloucose, serum 121  Potassium, Serum 4.2  Sodium, Serum 143    CMP  09-23-23 @ 07:36  Komal Aminotransferase(ALT/SGPT)55  Albumin, Serum 1.9  Alkaline Phosphatase, Serum 391  Anion Gap, Serum 9  Aspartate Aminotransferase (AST/SGOT)267  Bilirubin Total, Serum 9.8  Blood Urea Nitrogen, Serum 39  Calcium,Total Serum 8.4  Carbon Dioxide, Serum 19  Chloride, Serum 113  Creatinine, Serum 1.40  eGFR if  --  eGFR if Non African American --  Glucose, Serum 133  Potassium, Serum 4.2  Protein Total, Serum 6.0  Sodium, Serum 141    09-22-23 @ 13:35  Komal Aminotransferase(ALT/SGPT)41  Albumin, Serum 1.8  Alkaline Phosphatase, Serum 313  Anion Gap, Serum 9  Aspartate Aminotransferase (AST/SGOT)187  Bilirubin Total, Serum 9.9  Blood Urea Nitrogen, Serum 36  Calcium,Total Serum 8.1  Carbon Dioxide, Serum 20  Chloride, Serum 114  Creatinine, Serum 1.21  eGFR if  --  eGFR if Non African American --  Glucose, Serum 141  Potassium, Serum 4.1  Protein Total, Serum 5.7  Sodium, Serum 143    09-22-23 @ 06:01  Komal Aminotransferase(ALT/SGPT)38  Albumin, Serum 1.9  Alkaline Phosphatase, Serum 293  Anion Gap, Serum 8  Aspartate Aminotransferase (AST/SGOT)158  Bilirubin Total, Serum 9.7  Blood Urea Nitrogen, Serum 34  Calcium,Total Serum 8.5  Carbon Dioxide, Serum 21  Chloride, Serum 114  Creatinine, Serum 1.11  eGFR if  --  eGFR if Non African American --  Glucose, Serum 121  Potassium, Serum 4.2  Protein Total, Serum 5.9  Sodium, Serum 143

## 2023-09-24 NOTE — PROGRESS NOTE ADULT - ASSESSMENT
68M with PMHx of HTN, GERD and recent diagnosis of stage IV pancreatic cancer with numerous liver metastasis who presents to [facility] from home for second opinion regarding treatmnet options and progressive fatigue and weakness, as well as anorexia 2/2 poor appetite. Now s/p PTC placement and drainage of 2.5L of clear, yellow ascites (9/12) now s/p Tube check wnl and PTC internalized on 9/19.    CLD ADAT/IVF  Pain/nausea control PRN  Lactulose enema  Miralax bowel reg  IV meropenem (9/15-) and daptomycin (9/15-) fluconazole (9/16-)  Coreg 6.25mg bid  PPI BID  SCDs/IS/OOB  R posterior IR drain (hepatic abscess)  Anterior PTC drain, internalized and capped  Dispo: Comfort care

## 2023-09-24 NOTE — PROGRESS NOTE ADULT - ASSESSMENT
68M with PMHx of HTN, GERD and recent diagnosis of stage IV pancreatic cancer with numerous liver metastasis presented to [facility] and diagnosed with stage IV adenocarcinoma, s/p liver abscess drainage x 6 8/15, s/p ERCp with two stent placement as per records, getting paracentesis with possibly PCT drain placement, history of klebsiella bacteremia from outpatient basis , patient pending upgrade to ICU for monitoring, biliary drain placed, 2.5 L fluid drained which was clear and yellow, hypotensive during procedure, 2 FFPs given pre-op, biliary tree stents visualized and in place,  now s/p Tube check wnl and PTC internalized on 9/19 with worsening encephalopathy.    Encephalopathy - toxic vs. metabolic vs. infectious vs. hepatic vs. hypertensive vs. hypoxic vs. uremic vs. vascular.  Neurology consulted and it is believed to be metabolic in nature. CT Head ruled out CVA or any other overt etiology. Given poor prognosis, appropriate to have palliative care/hospice consulted. Unlikely to be infectious as patient also has been on numerous antibiotics for a stable period of time. Ammonia level was normal although after Lactulose, patient's mental status did improve - c/t monitor bowel movements and remain cognizant of adverse diarrhea. We would recommend holding any sedating agents that could worsen his AMS. Appreciate Medicine consult and surgery to remain primary team.    Mild euvolemic hyponatremia which did not auto correcting due to poor oral intake and liver disease - resolved on BID Salt tablets, Urea daily; Isotonic fluids can be continued for nutrition status, if poor nutrition continues; if mental status worsens, consider alternative forms of nutrition ( parenteral / NGT )   Hypoalbuminemia can lead to worsening third spacing, would recommend IV albumin infusion as needed  PRN FFPS as needed pre op for elevated coag levels   IR s/p paracentesis with PTC drain placement, now capped; c/t monitor LFTs  acute kidney injury on CKD - resolved   peritoneal cultures / blood culture - NGTD   Bile culture -VRE, klebsiella, citrobacter, candida   ID evaluated - c/w Meropenem, Fluconazole, Daptomycin for ESBL Klebsiella and VRE hepatic abscess; appreciate recs - will defer to hospice care team once transferred  normocytic anemia s/p PRBCs transfusions, HB stable   dvt ppx as per surgery   HTN- BP better controlled; c/w Coreg and Norvasc    Thank you for this consultation and we will continue to follow.

## 2023-09-24 NOTE — PROGRESS NOTE ADULT - SUBJECTIVE AND OBJECTIVE BOX
68M with PMHx of HTN, GERD and recent diagnosis of stage IV pancreatic cancer with numerous liver metastasis who presents to [facility] from home for second opinion regarding treatmnet options and progressive fatigue and weakness, as well as anorexia 2/2 poor appetite. Now s/p PTC placement and drainage of 2.5L of clear, yellow ascites (9/12) now s/p Tube check wnl and PTC internalized on 9/19.    CLD ADAT/IVF  Pain/nausea control PRN  Lactulose enema  Miralax bowel reg  IV meropenem (9/15-) and daptomycin (9/15-) fluconazole (9/16-)  Coreg 6.25mg bid  PPI BID  SCDs/IS/OOB  R posterior IR drain (hepatic abscess)  Anterior PTC drain, internalized and capped  Dispo: PT recs MARIE, but pt refused SUBJECTIVE: Pt seen and evaluated by chief resident on am rounds. Pt not interactive. Opens eyes, does not respond to voice.    Vital Signs Last 24 Hrs  T(C): 37.2 (24 Sep 2023 06:03), Max: 37.2 (24 Sep 2023 06:03)  T(F): 99 (24 Sep 2023 06:03), Max: 99 (24 Sep 2023 06:03)  HR: 58 (24 Sep 2023 06:03) (58 - 60)  BP: 119/71 (24 Sep 2023 06:03) (119/71 - 163/72)  BP(mean): 103 (23 Sep 2023 11:42) (101 - 103)  RR: 14 (24 Sep 2023 06:03) (14 - 20)  SpO2: 95% (24 Sep 2023 06:03) (95% - 98%)    Parameters below as of 24 Sep 2023 06:03  Patient On (Oxygen Delivery Method): room air        I&O's Summary    23 Sep 2023 07:01  -  24 Sep 2023 07:00  --------------------------------------------------------  IN: 0 mL / OUT: 315 mL / NET: -315 mL        Physical Exam:  General Appearance: Does not respond to voice, opens eyes  Pulmonary: Nonlabored breathing, no respiratory distress  Cardiovascular: NSR  Abdomen: Distended, soft  Extremities: WWP, SCD's in place     LABS:                        8.6    12.67 )-----------( 136      ( 23 Sep 2023 07:36 )             26.5     09-23    141  |  113<H>  |  39<H>  ----------------------------<  133<H>  4.2   |  19<L>  |  1.40<H>    Ca    8.4      23 Sep 2023 07:36  Phos  3.7     09-23  Mg     2.3     09-23    TPro  6.0  /  Alb  1.9<L>  /  TBili  9.8<H>  /  DBili  7.4<H>  /  AST  267<H>  /  ALT  55<H>  /  AlkPhos  391<H>  09-23    PT/INR - ( 22 Sep 2023 13:35 )   PT: 19.0 sec;   INR: 1.69          PTT - ( 22 Sep 2023 13:35 )  PTT:43.3 sec  Urinalysis Basic - ( 23 Sep 2023 07:36 )    Color: x / Appearance: x / SG: x / pH: x  Gluc: 133 mg/dL / Ketone: x  / Bili: x / Urobili: x   Blood: x / Protein: x / Nitrite: x   Leuk Esterase: x / RBC: x / WBC x   Sq Epi: x / Non Sq Epi: x / Bacteria: x

## 2023-09-25 NOTE — H&P ADULT - HISTORY OF PRESENT ILLNESS
Hospice GIP Admission  [facility] / [facility]  Contact Info: Call [facility] at [phone] or [facility] at [phone] for symptom management or to request interdiscplinary team intervention (RN/ASYA, MECHELLE, ) for patient/family    SYMPTOMS REQUIRING GIP LEVEL OF CARE:  [ ]Pain  [ ]Dyspnea  [ ]Anxiety/Agitation  [ ]Nausea  [ ]Active Seizure    HPI:    []Pain/Dyspnea managed by hourly monitoring and titration of (medication)  []Pain/Dyspnea improved as a result of aggressive titration of (medication)  []GIP to manage uncontrolled pain/dyspnea and continuous titrations of (medication)  []Requires frequent skilled nursing assessment for non-verbal signs of pain/dyspnea/agitation through grimacing, groaning, tachynea, writhing, rigidity  []Effectiveness of pain/tachypnea management is continuously reevaluated hourly to acheive maximal comfort    Comprehensive symptom assessment and justification of GIP level of care as noted. Patient continues to require symptom monitoring through multiple daily bedside assessments and daily intervention through the admistration of PRN medications and adjustment of scheduled opiates/opiate infusion. See patient's PRN use for the past 24hrs noted below. Extensive time spent discussing care plan with family. No unexpected adverse effects of opiates noted. Plan of care discussed collaboratively with Hospice and Facility staff.    PERTINENT PM/SXH:   Pancreatic cancer metastasized to liver      S/P hip replacement, left      FAMILY HISTORY:  No history of  in first degree relatives according to chart  Unable to obtain due to patient's encephalopathy    ITEMS NOT CHECKED ARE NOT PRESENT  SOCIAL HISTORY:   Significant other/partner:  []  Children:  []   Substance hx:  []   Tobacco hx:  []   Alcohol hx: []  Living Situation: []Home  []Long term care  []Rehab []Other  Mormon/Spiritual practice: ; Role of organized Jew [ ] important [ ] some [ ] unable to assess  Coping: [] well [] with difficulty [] poor coping [] unable to assess  Support system: [] strong [] adequate [] inadequate    ADVANCE DIRECTIVES:    [x]MOLST: DNR/DNI  DECISION MAKER(s):  [] Health Care Proxy(s)  [] Surrogate(s)  [] Guardian           Name(s)/Phone Number(s):     ALLERGIES:  No Known Allergies    MEDICATIONS  (STANDING):  HYDROmorphone Infusion 0.5 mG/Hr (0.5 mL/Hr) IV Continuous <Continuous>    MEDICATIONS  (PRN):  HYDROmorphone  Injectable 1 milliGRAM(s) IV Push every 2 hours PRN Moderate pain (4-6), Severe pain (7-10), Respiratory rate greater than 22  LORazepam   Injectable 0.5 milliGRAM(s) IV Push every 4 hours PRN Anxiety  ondansetron Injectable 4 milliGRAM(s) IV Push every 6 hours PRN Nausea and/or Vomiting    Analgesic Use (Scheduled and PRNs) for past 24 hours:      PRESENT SYMPTOMS/REVIEW OF SYSTEMS: []Unable to obtain due to poor mentation/encephalopathy  Source if other than patient:  []Family   []Team     Pain: [] yes [] no - see PAINAD  QOL Impact -   Location -                    Aggravating Factors -  Quality -  Radiation -  Timing -  Severity (0-10 scale) -   Minimal Acceptable Level (0-10 scale) -    PAIN AD Score:  (Nonverbal Pain Assessment Scale)    Dyspnea:                           []Mild  []Moderate []Severe  Anxiety:                             []Mild []Moderate []Severe  Fatigue:                             []Mild []Moderate []Severe  Nausea:                             []Mild []Moderate []Severe  Loss of Appetite:              []Mild []Moderate []Severe  Constipation:                    []Mild []Moderate []Severe    Other Symptoms:  [x]All Other Review Of Systems Negative     Palliative Performance Status Version 2:  %    PHYSICAL EXAM:  GENERAL:  [] NAD []Alert []Lethargic  []Cachexia  []Unarousable  []Verbal  []Non-Verbal  Behavioral:   []Anxiety  []Delirium []Agitation []Cooperative []Oriented x  HEENT:  []Normal  [] Moist Mucous Membranes []Dry mouth   []ET Tube/Trach  []Oral lesions  PULMONARY:   []Clear []Tachypnea  []Audible excessive secretions  []Normal Work of Breathing []Labored Breathing  []Rhonchi []Crackles []Wheezing  CARDIOVASCULAR:    []Regular Rate []Regular Rhythm []Irregular []Tachy  []Maxi  GASTROINTESTINAL:  []Soft  []Distended   []+BS  []Non tender []Tender  []PEG []OGT/ NGT  Last BM:  GENITOURINARY:  []Normal [] Incontinent   []Oliguria/Anuria   []Glass  MUSCULOSKELETAL:   []Normal Extremities  []Weakness  []Bed/Wheelchair bound []Edema  NEUROLOGIC:   []No focal deficits  []Cognitive impairment  []Dysphagia []Dysarthria []Paresis []Encephalopathic  SKIN:   []Normal   []Pressure ulcer(s)  []Rash    Vital Signs Last 24 Hrs  T(C): 37.8 (25 Sep 2023 12:39), Max: 37.8 (25 Sep 2023 12:39)  T(F): 100 (25 Sep 2023 12:39), Max: 100 (25 Sep 2023 12:39)  HR: 70 (25 Sep 2023 12:39) (70 - 71)  BP: 103/67 (25 Sep 2023 12:39) (103/67 - 104/62)  BP(mean): --  RR: 18 (25 Sep 2023 12:39) (18 - 18)  SpO2: 93% (25 Sep 2023 12:39) (93% - 94%)        LABS:        RADIOLOGY & ADDITIONAL STUDIES:    REFERRALS: [x]Hospice [x]Social Work  []Chaplaincy  []Patient/Family Support []Massage Therapy []Music Therapy    DISCUSSION OF CASE: Family - to obtain additional history and to provide emotional support; Hospice Liasion - to discuss plan of care; RN - to discuss symptom burden and regimen adjustment Hospice GIP Admission  [facility] / [facility]  Contact Info: Call [facility] at [phone] or [facility] at [phone] for symptom management or to request interdiscplinary team intervention (RN/ASYA, MECHELLE, ) for patient/family    SYMPTOMS REQUIRING GIP LEVEL OF CARE:  [ x]Pain  [ x]Dyspnea  [ ]Anxiety/Agitation  [ ]Nausea  [ ]Active Seizure    HPI:  68M with PMHx of HTN, GERD and recent diagnosis of stage IV pancreatic cancer with numerous liver metastasis who presents to [facility] from home for second opinion regarding treatmnet options and progressive fatigue and weakness, as well as anorexia 2/2 poor appetite. Now s/p PTC placement and drainage of 2.5L of clear, yellow ascites (9/12) now s/p Tube check wnl and PTC internalized on 9/19. Progressive deterioration during hospitalization. Worsening symptom burden with encephalopathy, neoplasm related pain and agitation. Decision made to pursue comfort care.       [x]Pain/Dyspnea managed by hourly monitoring and titration of (medication)  []Pain/Dyspnea improved as a result of aggressive titration of (medication)  []GIP to manage uncontrolled pain/dyspnea and continuous titrations of (medication)  []Requires frequent skilled nursing assessment for non-verbal signs of pain/dyspnea/agitation through grimacing, groaning, tachynea, writhing, rigidity  []Effectiveness of pain/tachypnea management is continuously reevaluated hourly to acheive maximal comfort    Comprehensive symptom assessment and justification of GIP level of care as noted. Patient continues to require symptom monitoring through multiple daily bedside assessments and daily intervention through the admistration of PRN medications and adjustment of scheduled opiates/opiate infusion. See patient's PRN use for the past 24hrs noted below. Extensive time spent discussing care plan with family. No unexpected adverse effects of opiates noted. Plan of care discussed collaboratively with Hospice and Facility staff.    PERTINENT PM/SXH:   Pancreatic cancer metastasized to liver      S/P hip replacement, left      FAMILY HISTORY:  No history of  in first degree relatives according to chart  Unable to obtain due to patient's encephalopathy    ITEMS NOT CHECKED ARE NOT PRESENT  SOCIAL HISTORY:   Significant other/partner:  [x]  Children:  [x]   Substance hx:  []   Tobacco hx:  []   Alcohol hx: []  Living Situation: []Home  []Long term care  []Rehab []Other  Baptist/Spiritual practice: ; Role of organized Baptist [ ] important [ ] some [ x] unable to assess  Coping: [] well [] with difficulty [] poor coping [] unable to assess  Support system: [] strong [] adequate [] inadequate    ADVANCE DIRECTIVES:    [x]MOLST: DNR/DNI  DECISION MAKER(s):  [] Health Care Proxy(s)  [] Surrogate(s)  [] Guardian           Name(s)/Phone Number(s):     ALLERGIES:  No Known Allergies    MEDICATIONS  (STANDING):  HYDROmorphone Infusion 0.5 mG/Hr (0.5 mL/Hr) IV Continuous <Continuous>    MEDICATIONS  (PRN):  HYDROmorphone  Injectable 1 milliGRAM(s) IV Push every 2 hours PRN Moderate pain (4-6), Severe pain (7-10), Respiratory rate greater than 22  LORazepam   Injectable 0.5 milliGRAM(s) IV Push every 4 hours PRN Anxiety  ondansetron Injectable 4 milliGRAM(s) IV Push every 6 hours PRN Nausea and/or Vomiting    Analgesic Use (Scheduled and PRNs) for past 24 hours:      PRESENT SYMPTOMS/REVIEW OF SYSTEMS: []Unable to obtain due to poor mentation/encephalopathy  Source if other than patient:  []Family   x[]Team     Pain: [] yes [x] no - see PAINAD  QOL Impact -   Location -                    Aggravating Factors -  Quality -  Radiation -  Timing -  Severity (0-10 scale) -   Minimal Acceptable Level (0-10 scale) -    PAIN AD Score:  (Nonverbal Pain Assessment Scale)    Dyspnea:                           []Mild  []Moderate []Severe  Anxiety:                             []Mild []Moderate []Severe  Fatigue:                             []Mild []Moderate []Severe  Nausea:                             []Mild []Moderate []Severe  Loss of Appetite:              []Mild []Moderate []Severe  Constipation:                    []Mild []Moderate []Severe    Other Symptoms:  [x]All Other Review Of Systems Negative     Palliative Performance Status Version 2: 10 %    PHYSICAL EXAM:  GENERAL:  [] NAD []Alert []Lethargic  [x]Cachexia  [x]Unarousable  []Verbal  []Non-Verbal  Behavioral:   []Anxiety  [x]Delirium []Agitation []Cooperative []Oriented x  HEENT:  []Normal  [] Moist Mucous Membranes [x]Dry mouth   []ET Tube/Trach  []Oral lesions  PULMONARY:   []Clear []Tachypnea  []Audible excessive secretions  []Normal Work of Breathing []Labored Breathing  [x]Rhonchi []Crackles []Wheezing  CARDIOVASCULAR:    []Regular Rate []Regular Rhythm []Irregular [x]Tachy  []Maxi  GASTROINTESTINAL:  []Soft  [x]Distended   []+BS  []Non tender [x]Tender  []PEG []OGT/ NGT  Last BM:  GENITOURINARY:  []Normal [] Incontinent   []Oliguria/Anuria   [x]Glass  MUSCULOSKELETAL:   []Normal Extremities  []Weakness  [x]Bed/Wheelchair bound []Edema  NEUROLOGIC:   []No focal deficits  [x]Cognitive impairment  []Dysphagia []Dysarthria []Paresis [x]Encephalopathic  SKIN:   [x]Normal   []Pressure ulcer(s)  []Rash    Vital Signs Last 24 Hrs  T(C): 37.8 (25 Sep 2023 12:39), Max: 37.8 (25 Sep 2023 12:39)  T(F): 100 (25 Sep 2023 12:39), Max: 100 (25 Sep 2023 12:39)  HR: 70 (25 Sep 2023 12:39) (70 - 71)  BP: 103/67 (25 Sep 2023 12:39) (103/67 - 104/62)  BP(mean): --  RR: 18 (25 Sep 2023 12:39) (18 - 18)  SpO2: 93% (25 Sep 2023 12:39) (93% - 94%)        LABS:        RADIOLOGY & ADDITIONAL STUDIES:    REFERRALS: [x]Hospice [x]Social Work  []Chaplaincy  []Patient/Family Support []Massage Therapy []Music Therapy    DISCUSSION OF CASE: Family - to obtain additional history and to provide emotional support; Hospice Liasion - to discuss plan of care; RN - to discuss symptom burden and regimen adjustment

## 2023-09-25 NOTE — PATIENT PROFILE ADULT - DO YOU LACK THE NECESSARY SUPPORT TO HELP YOU COPE WITH LIFE CHALLENGES?
Occupational Therapy   Occupational Therapy Initial Assessment  Date: 2021   Patient Name: Brandon Mckeon  MRN: [id number]     : 1958    RN [facility] reports patient is medically stable for therapy treatment this date. Chart reviewed prior to treatment and patient is agreeable for therapy. All lines intact and patient positioned comfortably at end of treatment. All patient needs addressed prior to ending therapy session. Pt currently functioning below baseline. Would suggest additional therapy at time of discharge to maximize long term outcomes and prevent re-admission. Please refer to AM-PAC score for current level of function. Date of Service: 2021    Discharge Recommendations:  Patient would benefit from continued therapy after discharge  OT Equipment Recommendations  Equipment Needed: Yes (continue to assess and speak to family as needed)  Mobility Devices: ADL Assistive Devices; Nely Nicki: Rolling  ADL Assistive Devices: Reacher;Sock-Aid Soft;Long-handled Shoe Horn;Long-handled Sponge;Grab Bars - shower; Toileting - 3-in-1 Commode    Assessment   Performance deficits / Impairments: Decreased functional mobility ; Decreased safe awareness;Decreased balance;Decreased coordination;Decreased ADL status; Decreased cognition;Decreased vision/visual deficit; Decreased posture;Decreased endurance;Decreased high-level IADLs;Decreased strength;Decreased sensation;Decreased fine motor control  Assessment: At this time, pt is a high fall risk and requires assist of staff when up with RW. Skilled OT is indicated to increase I and safety with ADL and functional performance to return home with assist as needed.   Prognosis: Fair  Decision Making: High Complexity  OT Education: OT Role;Energy Conservation;Transfer Training;Plan of Care  Patient Education: safety in function, call light use/fall prevention, benefits of being up OOB as able and recommendations for continued therapy, pursed lip breathing, proper bed mob tech, postural control and weight shifting  Barriers to Learning: lethargy and memory/cognitive deficits  REQUIRES OT FOLLOW UP: Yes  Activity Tolerance  Activity Tolerance: Patient limited by fatigue;Treatment limited secondary to decreased cognition;Patient limited by pain  Activity Tolerance: poor with max edu and encouragement needed  Safety Devices  Safety Devices in place: Yes  Type of devices: Bed alarm in place;Call light within reach; Left in bed;Patient at risk for falls;Gait belt;Nurse notified (BLE's elevated/pillow under to increase comfort and reduce skin issues as able)           Patient Diagnosis(es): The primary encounter diagnosis was COVID-19. Diagnoses of Pneumonia due to infectious organism, unspecified laterality, unspecified part of lung, Septicemia ([facility] ESRD (end stage renal disease) ([facility] and NSTEMI (non-ST elevated myocardial infarction) [facility]) were also pertinent to this visit. has a past medical history of ADHD (attention deficit hyperactivity disorder), COVID-19, COVID-19, Depression, DM2 (diabetes mellitus, type 2) ([facility] Erectile dysfunction, Gastroesophageal reflux disease, Hyperlipidemia, Hypertension, Kidney stone, Low back pain of thoracolumbar region with sciatica, and Neuropathy. has a past surgical history that includes Tonsillectomy; Cystoscopy (07/20/2018); pr cystoscopy,insert ureteral stent (Bilateral, 7/20/2018); Cystocopy (08/03/2018); pr cysto/uretero/pyeloscopy, calculus tx (Bilateral, 8/3/2018); Upper gastrointestinal endoscopy (N/A, 12/7/2020); Colonoscopy (N/A, 12/7/2020); and IR TUNNELED CVC PLACE WO SQ PORT/PUMP > 5 YEARS (6/28/2021). PER H&P: Mervat West is a 66 y.o.  Non- / non  male who presents with Fever (went to dialysis and was refused services because covid positive) and Shortness of Breath   and is admitted to the hospital for the management of ESRD needing dialysis [facility]).       Restrictions  Restrictions/Precautions  Restrictions/Precautions: Fall Risk  Position Activity Restriction  Other position/activity restrictions: drop plus iso due to COVID, RUE IV, 2 L O2 per NC, alarms, up with assist, port R side for HD(M, W, F) per RN    Subjective   General  Chart Reviewed: Yes  Patient assessed for rehabilitation services?: Yes  Family / Caregiver Present: No  *RN in room when pt states his B shld, B knees, and back hurt as well as abd area. Social/Functional History  Social/Functional History  Lives With: Family (niece and pt states is 32 yrs old and he is trying to find another place to live)  Type of Home: House  Home Layout: Two level, Bed/Bath upstairs (FF Steps with B rails)  Home Access: Stairs to enter with rails (front door)  Entrance Stairs - Number of Steps: 5  Entrance Stairs - Rails: Left  Bathroom Shower/Tub: Tub/Shower unit  Bathroom Toilet: Standard (vanity close)  Bathroom Equipment:  (no DME)  Home Equipment:  (no DME)  Receives Help From:  (pt was unable to state any support)  ADL Assistance: [facility] [address] [facility] [address]: Independent  Homemaking Responsibilities: Yes  Ambulation Assistance: Independent  Transfer Assistance: Independent  Active : No  Mode of Transportation: Bus  Occupation: On disability  Leisure & Hobbies: watch TV/sports  IADL Comments: *Question reliability as pt is a poor historian. Additional Comments: Pt denies falls. Objective   Vision: Impaired (Pt states his blurry is vision and changes are new)  Vision Exceptions: Wears glasses for reading  Hearing: Exceptions to [facility]  Hearing Exceptions: Hard of hearing/hearing concerns    Orientation  Overall Orientation Status: Impaired (slow and delayed processing noted)  Orientation Level: Oriented to place; Disoriented to time;Disoriented to situation;Oriented to person (pt not alert to month and year)  Observation/Palpation  Posture: Fair (with RW)  Observation: pt has a flat affect, O2 per NC, pt with increased lethargy and max arousals needed to stay alert during session  Balance  Sitting Balance: Contact guard assistance (CG to SBA)  Standing Balance: Moderate assistance (x2 with RW)  Standing Balance  Time: stand hugo < 1 min with RW and max edu and encouragment needed  Functional Mobility  Functional - Mobility Device: Rolling Walker  Activity:  (side stepping only towards HOB to reposition pt)  Assist Level: Moderate assistance (x2 for safety/balance)  Functional Mobility Comments: MAX verbal instruction/tactile assist for upright posture, keeping B eyes open, weight shifting, AD/BLE sequence, pursed lip breathing and awareness/assist with lines to increase safety/reduce falls. Toilet Transfers  Toilet Transfers Comments: N/T and pt with no needs     ADL  Feeding: Setup  Grooming: Setup;Minimal assistance  UE Bathing: Setup;Minimal assistance  LE Bathing: Setup;Dependent/Total  UE Dressing: Setup; Moderate assistance (with hosp gown)  LE Dressing: Setup;Dependent/Total (max assist for B socks supine in bed; 2 staff assist to stand and doff jeans and pj bottoms; max assist to thread BLE's in hosp pants and 2 staff assist to stand with RW for all clothing mgt up/tying)  Toileting: Dependent/Total (x2 staff for all clothing mgt)  Additional Comments: *Pt is DEP with 2 staff assist when up with RW for safety/balance support. Tone RUE  RUE Tone: Normotonic  Tone LUE  LUE Tone: Normotonic  Coordination  Movements Are Fluid And Coordinated: No  Coordination and Movement description: Fine motor impairments     Bed mobility  Supine to Sit: Moderate assistance;2 Person assistance  Sit to Supine:  Moderate assistance;2 Person assistance (pt requested back to bed; not safe to sit in chair due to increased lethargy/safety concerns)  Scooting: Dependent/Total (x2 satff to boost up in bed)  Comment: MAX verbal instruction/tactile assist for hand placement on bed rail, proper log rolling tech, use of BUE's to scoot to EOB to place BLE's on floor, pursed lip breathing tech and awareness/assist with lines to increase safety. Transfers  Stand Step Transfers: Unable to assess (pt with increased lethargy/not safe to sit in chair and pt requested back to bed)  Sit to stand: 2 Person assistance; Moderate assistance (completed x3 stand from EOB this date)  Stand to sit: 2 Person assistance; Moderate assistance  Transfer Comments: MAX verbal instruction/tactile assist for B hand placement pushing from surface seated on as well as reaching back, nose over toes tech, pursed lip breathing, upright posture and weight shifting, controlled stand to sit as well as awareness/assist with lines to increase overall safety. Cognition  Overall Cognitive Status: Exceptions  Arousal/Alertness: Delayed responses to stimuli  Following Commands: Follows one step commands with repetition; Follows one step commands with increased time  Attention Span: Attends with cues to redirect; Difficulty attending to directions; Difficulty dividing attention  Memory: Decreased short term memory;Decreased recall of recent events  Safety Judgement: Decreased awareness of need for assistance;Decreased awareness of need for safety  Problem Solving: Assistance required to implement solutions;Assistance required to correct errors made;Assistance required to generate solutions;Assistance required to identify errors made;Decreased awareness of errors  Insights: Not aware of deficits  Initiation: Requires cues for all  Sequencing: Requires cues for all  Perception  Overall Perceptual Status: Impaired     Sensation  Overall Sensation Status: Impaired (pt c/o constant numbness in B feet/L hand)        LUE AROM (degrees)  LUE AROM : WFL  RUE AROM (degrees)  RUE AROM : WFL  LUE Strength  Gross LUE Strength: WFL  LUE Strength Comment: BUE strength grossly 4/5  RUE Strength  Gross RUE Strength: WFL                   Plan   Plan  Times per week: 4-5x/week 1x/day as hugo  Current Treatment Recommendations: Strengthening, Balance Training, Functional Mobility Training, Safety Education & Training, Positioning, Endurance Training, Neuromuscular Re-education, Patient/Caregiver Education & Training, Self-Care / ADL, Equipment Evaluation, Education, & procurement, Cognitive/Perceptual Training, Home Management Training, Pain Management, Cognitive Reorientation                                                  AM-PAC Score   12    Co-treatment with PT warranted secondary to decreased safety and independence requiring 2 skilled therapy professionals to address individual discipline's goals. OT addressing preparation for ADL transfer, sitting and standing balance for increased ADL performance, sitting/activity tolerance, functional reaching, environmental safety/scanning, fall prevention, proper bed mob tech, functional mobility for ADL transfers, ability to sequence and follow directions and functional UE strength. Goals  Short term goals  Time Frame for Short term goals: by discharge, pt to demo  Short term goal 1: bed mob tasks with use of rail as needed to min assist of 1. Short term goal 2: increase BUE strength by a 1/2 grade to assist with self care and complete BUE HEP with use of handouts and SUP. Short term goal 3: UB ADL to set up and LB ADL to mod assist of 1 and use of AD/AE. Short term goal 4: toileting tasks with use of AD/BSC and grab bar as needed to mod assist of 1. Short term goal 5: ADL transfers and functional mob with AD as needed to min assist of 1. Long term goals  Long term goal 1: Pt to stand with CG and ADl hugo > 8 mins as able to reduce fall with ADL's. Long term goal 2: Pt/caregivers to be I with pressure relief, COVID edu, EC/WS and fall prevention tech, BUE HEP, DME/AE and AD recommendations with use of handouts as needed. Patient Goals   Patient goals : Pt stated he just wants to sleep!        Therapy Time   Individual Concurrent Group Co-treatment   Time In 0950 (plus 10 min chart review/nursing communication)         Time Out 1028         Minutes 38         Timed Code Treatment Minutes: 25 Minutes       Felicity Brar, OT no

## 2023-09-25 NOTE — DISCHARGE NOTE NURSING/CASE MANAGEMENT/SOCIAL WORK - PATIENT PORTAL LINK FT
You can access the FollowMyHealth Patient Portal offered by [facility] by registering at the following website: [email or web address]. By joining [employer]’s FollowMyHealth portal, you will also be able to view your health information using other applications (apps) compatible with our system.

## 2023-09-25 NOTE — DISCHARGE NOTE NURSING/CASE MANAGEMENT/SOCIAL WORK - NSDCPEFALRISK_GEN_ALL_CORE
For information on Fall & Injury Prevention, visit: [email or web address] OR  [email or web address] OR  https://www.cdc.gov/steadi/patient.html

## 2023-09-25 NOTE — PROGRESS NOTE ADULT - ASSESSMENT
68M with PMHx of HTN, GERD and recent diagnosis of stage IV pancreatic cancer with numerous liver metastasis who presents to [facility] from home for second opinion regarding treatmnet options and progressive fatigue and weakness, as well as anorexia 2/2 poor appetite. Now s/p PTC placement and drainage of 2.5L of clear, yellow ascites (9/12) now s/p Tube check wnl and PTC internalized on 9/19.    CLD ADAT/IVF  Pain/nausea control PRN  Lactulose enema  Miralax bowel reg  IV meropenem (9/15-) and daptomycin (9/15-) fluconazole (9/16-)  Coreg 6.25mg bid  PPI BID  SCDs/IS/OOB  R posterior IR drain (hepatic abscess)  Anterior PTC drain, internalized and capped  Dispo: PT recs MARIE, but pt refused; likely transfer to palliative care

## 2023-09-25 NOTE — PROGRESS NOTE ADULT - PROVIDER SPECIALTY LIST ADULT
Gastroenterology
Surgery
Surgery
Infectious Disease
Internal Medicine
Neurology
Surgery
Gastroenterology
Infectious Disease
Internal Medicine
Surgery
Infectious Disease
Infectious Disease
Internal Medicine
SICU
Surgery

## 2023-09-25 NOTE — PATIENT PROFILE ADULT - FALL HARM RISK - HARM RISK INTERVENTIONS

## 2023-09-25 NOTE — PROGRESS NOTE ADULT - SUBJECTIVE AND OBJECTIVE BOX
SUBJECTIVE:  Patient was evaluated at bedside this AM with chief resident present. Patients was asleep and appears comfortable in bed.    MEDICATIONS  (STANDING):    MEDICATIONS  (PRN):  HYDROmorphone  Injectable 0.5 milliGRAM(s) IV Push every 4 hours PRN Severe Pain (7 - 10)  LORazepam   Injectable 0.5 milliGRAM(s) IV Push every 6 hours PRN Agitation  ondansetron Injectable 4 milliGRAM(s) IV Push every 6 hours PRN Nausea      Vital Signs Last 24 Hrs  T(C): 37.3 (25 Sep 2023 05:32), Max: 37.3 (25 Sep 2023 05:32)  T(F): 99.1 (25 Sep 2023 05:32), Max: 99.1 (25 Sep 2023 05:32)  HR: 71 (25 Sep 2023 05:32) (62 - 71)  BP: 104/62 (25 Sep 2023 05:32) (104/62 - 121/72)  BP(mean): --  RR: 18 (25 Sep 2023 05:32) (15 - 18)  SpO2: 94% (25 Sep 2023 05:32) (94% - 96%)    Parameters below as of 25 Sep 2023 05:32  Patient On (Oxygen Delivery Method): room air        Physical Exam:  General Appearance: Sleeping/snoring  Pulmonary: Nonlabored breathing, no respiratory distress  Cardiovascular: NSR  Abdomen: mildly distended, soft, lack of grimace on palpation  Extremities: WWP, SCD's in place     I&O's Summary      LABS:              CAPILLARY BLOOD GLUCOSE            RADIOLOGY & ADDITIONAL STUDIES:

## 2023-09-25 NOTE — H&P ADULT - ASSESSMENT
68 M with stage IV pancreatic cancer, neoplasm related pain, encephalopathy, agitation, encounter for palliative care.

## 2023-09-25 NOTE — H&P ADULT - PROBLEM SELECTOR PLAN 5
- Patient with stage IV pancreatic cancer.  - Actively dying.  - Admitted to inpatient hospice for pain and agitation control, requiring IV opiates.   - Prognosis is hours.

## 2023-09-25 NOTE — H&P ADULT - PROBLEM SELECTOR PLAN 2
- Dilaudid gtt at 0.5mg / hour started today given extensive prn usage.  - Dilaudid 1mg IV Q2 hour prn.  - Low threshold to double prn dosage if patient is uncomfortable.

## 2023-09-25 NOTE — PROGRESS NOTE ADULT - REASON FOR ADMISSION
metastatic pancreatic ca

## 2023-09-25 NOTE — PATIENT PROFILE ADULT - IS THERE A SUSPICION OF ABUSE/NEGLIGENCE?
PAST SURGICAL HISTORY:  H/O hernia repair     NVD (normal vaginal delivery)     [place] teeth extracted     
no

## 2023-09-25 NOTE — PROGRESS NOTE ADULT - NUTRITIONAL ASSESSMENT
This patient has been assessed with a concern for Malnutrition and has been determined to have a diagnosis/diagnoses of Severe protein-calorie malnutrition.    This patient is being managed with:   Diet Clear Liquid-  Supplement Feeding Modality:  Oral  Ensure Clear Cans or Servings Per Day:  1       Frequency:  Three Times a day  Entered: Sep 20 2023  3:59PM  
This patient has been assessed with a concern for Malnutrition and has been determined to have a diagnosis/diagnoses of Severe protein-calorie malnutrition.    This patient is being managed with:   Diet NPO-  Except Medications  Entered: Sep 12 2023 12:11AM  
This patient has been assessed with a concern for Malnutrition and has been determined to have a diagnosis/diagnoses of Severe protein-calorie malnutrition.    This patient is being managed with:   Diet Clear Liquid-  Entered: Sep 20 2023 12:21AM  
This patient has been assessed with a concern for Malnutrition and has been determined to have a diagnosis/diagnoses of Severe protein-calorie malnutrition.    This patient is being managed with:   Diet NPO-  Entered: Sep 16 2023  1:42AM  
This patient has been assessed with a concern for Malnutrition and has been determined to have a diagnosis/diagnoses of Severe protein-calorie malnutrition.    This patient is being managed with:   Diet Clear Liquid-  Supplement Feeding Modality:  Oral  Ensure Clear Cans or Servings Per Day:  1       Frequency:  Three Times a day  Entered: Sep 20 2023  3:59PM  
This patient has been assessed with a concern for Malnutrition and has been determined to have a diagnosis/diagnoses of Severe protein-calorie malnutrition.    This patient is being managed with:   Diet Regular-  Low Fat (LOWFAT)  Entered: Sep 13 2023  1:25PM  
This patient has been assessed with a concern for Malnutrition and has been determined to have a diagnosis/diagnoses of Severe protein-calorie malnutrition.    This patient is being managed with:   Diet Clear Liquid-  Supplement Feeding Modality:  Oral  Ensure Clear Cans or Servings Per Day:  1       Frequency:  Three Times a day  Entered: Sep 20 2023  3:59PM  
This patient has been assessed with a concern for Malnutrition and has been determined to have a diagnosis/diagnoses of Severe protein-calorie malnutrition.    This patient is being managed with:   Diet Clear Liquid-  Supplement Feeding Modality:  Oral  Ensure Clear Cans or Servings Per Day:  1       Frequency:  Three Times a day  Entered: Sep 20 2023  3:59PM  
This patient has been assessed with a concern for Malnutrition and has been determined to have a diagnosis/diagnoses of Severe protein-calorie malnutrition.    This patient is being managed with:   Diet NPO-  Except Medications  Entered: Sep 19 2023 12:03AM  
This patient has been assessed with a concern for Malnutrition and has been determined to have a diagnosis/diagnoses of Severe protein-calorie malnutrition.    This patient is being managed with:   Diet Regular-  Entered: Sep 12 2023  6:21PM  
This patient has been assessed with a concern for Malnutrition and has been determined to have a diagnosis/diagnoses of Severe protein-calorie malnutrition.    This patient is being managed with:   Diet Regular-  Low Fat (LOWFAT)  Entered: Sep 13 2023  1:25PM

## 2023-09-25 NOTE — H&P ADULT - PROBLEM SELECTOR PLAN 1
- Stage IV disease.  - Extensive metastatic disease to liver.  - Poor prognosis.  - Patient is currently in the dying process.

## 2023-09-25 NOTE — PATIENT PROFILE ADULT - FUNCTIONAL ASSESSMENT - BASIC MOBILITY 6.
1-calculated by average/Not able to assess (calculate score using [facility] averaging method)

## 2023-09-26 PROBLEM — C25.9 MALIGNANT NEOPLASM OF PANCREAS, UNSPECIFIED: Chronic | Status: ACTIVE | Noted: 2023-01-01

## 2023-09-26 NOTE — DISCHARGE NOTE FOR THE EXPIRED PATIENT - HOSPITAL COURSE
68M with PMHx of HTN, GERD and recent diagnosis of stage IV pancreatic cancer with numerous liver metastasis who presents to [facility] from home for second opinion regarding treatmnet options and progressive fatigue and weakness, as well as anorexia 2/2 poor appetite. Now s/p PTC placement and drainage of 2.5L of clear, yellow ascites (9/12) now s/p Tube check wnl and PTC internalized on 9/19. Progressive deterioration during hospitalization. Worsening symptom burden with encephalopathy, neoplasm related pain and agitation. Decision made to pursue comfort care.

## 2023-09-28 DIAGNOSIS — B95.2 ENTEROCOCCUS AS THE CAUSE OF DISEASES CLASSIFIED ELSEWHERE: ICD-10-CM

## 2023-09-28 DIAGNOSIS — Z16.21 RESISTANCE TO VANCOMYCIN: ICD-10-CM

## 2023-09-28 DIAGNOSIS — C78.7 SECONDARY MALIGNANT NEOPLASM OF LIVER AND INTRAHEPATIC BILE DUCT: ICD-10-CM

## 2023-09-28 DIAGNOSIS — Z41.8 ENCOUNTER FOR OTHER PROCEDURES FOR PURPOSES OTHER THAN REMEDYING HEALTH STATE: ICD-10-CM

## 2023-09-28 DIAGNOSIS — G89.3 NEOPLASM RELATED PAIN (ACUTE) (CHRONIC): ICD-10-CM

## 2023-09-28 DIAGNOSIS — N17.9 ACUTE KIDNEY FAILURE, UNSPECIFIED: ICD-10-CM

## 2023-09-28 DIAGNOSIS — B96.1 KLEBSIELLA PNEUMONIAE [K. PNEUMONIAE] AS THE CAUSE OF DISEASES CLASSIFIED ELSEWHERE: ICD-10-CM

## 2023-09-28 DIAGNOSIS — Z51.5 ENCOUNTER FOR PALLIATIVE CARE: ICD-10-CM

## 2023-09-28 DIAGNOSIS — R68.0 HYPOTHERMIA, NOT ASSOCIATED WITH LOW ENVIRONMENTAL TEMPERATURE: ICD-10-CM

## 2023-09-28 DIAGNOSIS — C25.9 MALIGNANT NEOPLASM OF PANCREAS, UNSPECIFIED: ICD-10-CM

## 2023-09-28 DIAGNOSIS — F32.89 OTHER SPECIFIED DEPRESSIVE EPISODES: ICD-10-CM

## 2023-09-28 DIAGNOSIS — Z66 DO NOT RESUSCITATE: ICD-10-CM

## 2023-09-28 DIAGNOSIS — K75.0 ABSCESS OF LIVER: ICD-10-CM

## 2023-09-28 DIAGNOSIS — B37.89 OTHER SITES OF CANDIDIASIS: ICD-10-CM

## 2023-09-28 DIAGNOSIS — L24.A0 IRRITANT CONTACT DERMATITIS DUE TO FRICTION OR CONTACT WITH BODY FLUIDS, UNSPECIFIED: ICD-10-CM

## 2023-09-28 DIAGNOSIS — R18.8 OTHER ASCITES: ICD-10-CM

## 2023-09-28 DIAGNOSIS — A41.9 SEPSIS, UNSPECIFIED ORGANISM: ICD-10-CM

## 2023-09-28 DIAGNOSIS — K83.1 OBSTRUCTION OF BILE DUCT: ICD-10-CM

## 2023-09-28 DIAGNOSIS — Z16.12 EXTENDED SPECTRUM BETA LACTAMASE (ESBL) RESISTANCE: ICD-10-CM

## 2023-09-28 DIAGNOSIS — G93.41 METABOLIC ENCEPHALOPATHY: ICD-10-CM

## 2023-09-28 DIAGNOSIS — D64.9 ANEMIA, UNSPECIFIED: ICD-10-CM

## 2023-09-28 DIAGNOSIS — E43 UNSPECIFIED SEVERE PROTEIN-CALORIE MALNUTRITION: ICD-10-CM

## 2023-09-28 DIAGNOSIS — I10 ESSENTIAL (PRIMARY) HYPERTENSION: ICD-10-CM

## 2023-09-28 DIAGNOSIS — E87.1 HYPO-OSMOLALITY AND HYPONATREMIA: ICD-10-CM

## 2023-09-28 DIAGNOSIS — R78.81 BACTEREMIA: ICD-10-CM

## 2023-09-28 DIAGNOSIS — Z87.891 PERSONAL HISTORY OF NICOTINE DEPENDENCE: ICD-10-CM

## 2023-09-28 DIAGNOSIS — E88.09 OTHER DISORDERS OF PLASMA-PROTEIN METABOLISM, NOT ELSEWHERE CLASSIFIED: ICD-10-CM

## 2023-09-28 DIAGNOSIS — Z96.642 PRESENCE OF LEFT ARTIFICIAL HIP JOINT: ICD-10-CM

## 2023-09-29 DIAGNOSIS — R63.0 ANOREXIA: ICD-10-CM

## 2023-09-29 DIAGNOSIS — K21.9 GASTRO-ESOPHAGEAL REFLUX DISEASE WITHOUT ESOPHAGITIS: ICD-10-CM

## 2023-09-29 DIAGNOSIS — R45.1 RESTLESSNESS AND AGITATION: ICD-10-CM

## 2023-09-29 DIAGNOSIS — Z96.642 PRESENCE OF LEFT ARTIFICIAL HIP JOINT: ICD-10-CM

## 2023-09-29 DIAGNOSIS — J96.00 ACUTE RESPIRATORY FAILURE, UNSPECIFIED WHETHER WITH HYPOXIA OR HYPERCAPNIA: ICD-10-CM

## 2023-09-29 DIAGNOSIS — C78.7 SECONDARY MALIGNANT NEOPLASM OF LIVER AND INTRAHEPATIC BILE DUCT: ICD-10-CM

## 2023-09-29 DIAGNOSIS — Z51.5 ENCOUNTER FOR PALLIATIVE CARE: ICD-10-CM

## 2023-09-29 DIAGNOSIS — G89.3 NEOPLASM RELATED PAIN (ACUTE) (CHRONIC): ICD-10-CM

## 2023-09-29 DIAGNOSIS — R53.2 FUNCTIONAL QUADRIPLEGIA: ICD-10-CM

## 2023-09-29 DIAGNOSIS — I10 ESSENTIAL (PRIMARY) HYPERTENSION: ICD-10-CM

## 2023-09-29 DIAGNOSIS — G93.40 ENCEPHALOPATHY, UNSPECIFIED: ICD-10-CM

## 2023-09-29 DIAGNOSIS — C25.9 MALIGNANT NEOPLASM OF PANCREAS, UNSPECIFIED: ICD-10-CM

## 2023-09-29 DIAGNOSIS — Z66 DO NOT RESUSCITATE: ICD-10-CM

## 2024-09-28 NOTE — ED PROVIDER NOTE - OBJECTIVE STATEMENT
67yo M recent diagnosis of metastatic pancreatic cancer s/p cholangitis, abscess, w/ biliary stents and drain in place (family not sure about details), here for second opinion. Care has been at [facility]. wife at bedside states bilirubin increasing, most recent to 14. saw oncology and referred to palliative. came to [facility] ed today for second opinion and requesting to speak with dr. castorena, though they seem to be aware that he is not on call. they have not seen him prior, and state that a friend told them to request this consult.
BUE intact/finger to nose

## 2025-03-04 NOTE — BH CONSULTATION LIAISON ASSESSMENT NOTE - GENERAL APPEARANCE
Medication: multiple meds passed protocol.   Last office visit date: 2/10/25  Next appointment scheduled?: Yes   Number of refills given: 30 day with 5 refills    
No deformities present